# Patient Record
Sex: MALE | Race: WHITE | Employment: OTHER
[De-identification: names, ages, dates, MRNs, and addresses within clinical notes are randomized per-mention and may not be internally consistent; named-entity substitution may affect disease eponyms.]

---

## 2017-01-04 ENCOUNTER — OFFICE VISIT (OUTPATIENT)
Dept: FAMILY MEDICINE CLINIC | Facility: CLINIC | Age: 82
End: 2017-01-04

## 2017-01-04 VITALS
OXYGEN SATURATION: 97 % | BODY MASS INDEX: 30.16 KG/M2 | SYSTOLIC BLOOD PRESSURE: 110 MMHG | HEIGHT: 65 IN | WEIGHT: 181 LBS | HEART RATE: 67 BPM | DIASTOLIC BLOOD PRESSURE: 78 MMHG

## 2017-01-04 DIAGNOSIS — E78.5 DYSLIPIDEMIA: ICD-10-CM

## 2017-01-04 DIAGNOSIS — Z12.5 PROSTATE CANCER SCREENING: ICD-10-CM

## 2017-01-04 DIAGNOSIS — Z00.00 ROUTINE GENERAL MEDICAL EXAMINATION AT A HEALTH CARE FACILITY: ICD-10-CM

## 2017-01-04 DIAGNOSIS — F51.01 PRIMARY INSOMNIA: ICD-10-CM

## 2017-01-04 DIAGNOSIS — J84.10 PULMONARY INTERSTITIAL FIBROSIS (HCC): Primary | ICD-10-CM

## 2017-01-04 PROCEDURE — G0439 PPPS, SUBSEQ VISIT: HCPCS | Performed by: FAMILY MEDICINE

## 2017-01-04 RX ORDER — ZOLPIDEM TARTRATE 10 MG/1
10 TABLET ORAL NIGHTLY PRN
Qty: 30 TABLET | Refills: 3 | Status: SHIPPED | OUTPATIENT
Start: 2017-01-04 | End: 2017-02-03

## 2017-01-04 ASSESSMENT — ANXIETY QUESTIONNAIRES: GAD7 TOTAL SCORE: 0

## 2017-01-04 ASSESSMENT — LIFESTYLE VARIABLES: HOW OFTEN DO YOU HAVE A DRINK CONTAINING ALCOHOL: 0

## 2017-01-04 ASSESSMENT — PATIENT HEALTH QUESTIONNAIRE - PHQ9: SUM OF ALL RESPONSES TO PHQ QUESTIONS 1-9: 0

## 2017-01-06 ENCOUNTER — TELEPHONE (OUTPATIENT)
Dept: FAMILY MEDICINE CLINIC | Facility: CLINIC | Age: 82
End: 2017-01-06

## 2017-01-10 LAB
ALBUMIN SERPL-MCNC: 4.1 G/DL
ALP BLD-CCNC: 69 U/L
ALT SERPL-CCNC: 25 U/L
AST SERPL-CCNC: 26 U/L
BASOPHILS ABSOLUTE: 0 /ΜL
BASOPHILS RELATIVE PERCENT: 0.3 %
BILIRUB SERPL-MCNC: 1.2 MG/DL (ref 0.1–1.4)
BUN BLDV-MCNC: 19 MG/DL
CALCIUM SERPL-MCNC: 8.8 MG/DL
CHLORIDE BLD-SCNC: 102 MMOL/L
CHOLESTEROL, TOTAL: 182 MG/DL
CHOLESTEROL/HDL RATIO: 5.1
CO2: 26 MMOL/L
CREAT SERPL-MCNC: 0.89 MG/DL
EOSINOPHILS ABSOLUTE: 0 /ΜL
EOSINOPHILS RELATIVE PERCENT: 0.4 %
GFR CALCULATED: >60
GLUCOSE BLD-MCNC: 106 MG/DL
HCT VFR BLD CALC: 42.1 % (ref 41–53)
HDLC SERPL-MCNC: 36 MG/DL (ref 35–70)
HEMOGLOBIN: 14.4 G/DL (ref 13.5–17.5)
LDL CHOLESTEROL CALCULATED: 126 MG/DL (ref 0–160)
LYMPHOCYTES ABSOLUTE: 1.2 /ΜL
LYMPHOCYTES RELATIVE PERCENT: 25.9 %
MCH RBC QN AUTO: 29.9 PG
MCHC RBC AUTO-ENTMCNC: 34.1 G/DL
MCV RBC AUTO: 88 FL
MONOCYTES ABSOLUTE: 0.8 /ΜL
MONOCYTES RELATIVE PERCENT: 16.6 %
NEUTROPHILS ABSOLUTE: 2.7 /ΜL
NEUTROPHILS RELATIVE PERCENT: 56.8 %
PDW BLD-RTO: 15 %
PLATELET # BLD: 120 K/ΜL
PMV BLD AUTO: 9 FL
POTASSIUM SERPL-SCNC: 4.3 MMOL/L
PROSTATE SPECIFIC ANTIGEN: 2.08 NG/ML
RBC # BLD: 4.79 10^6/ΜL
SODIUM BLD-SCNC: 137 MMOL/L
TOTAL PROTEIN: 7.6
TRIGL SERPL-MCNC: 98 MG/DL
VLDLC SERPL CALC-MCNC: 20 MG/DL
WBC # BLD: 4.8 10^3/ML

## 2017-01-11 DIAGNOSIS — Z12.5 PROSTATE CANCER SCREENING: ICD-10-CM

## 2017-01-11 DIAGNOSIS — E78.5 DYSLIPIDEMIA: ICD-10-CM

## 2017-02-14 RX ORDER — TAMSULOSIN HYDROCHLORIDE 0.4 MG/1
CAPSULE ORAL
Qty: 90 CAPSULE | Refills: 3 | Status: SHIPPED | OUTPATIENT
Start: 2017-02-14 | End: 2018-02-09 | Stop reason: SDUPTHER

## 2017-04-21 ENCOUNTER — OFFICE VISIT (OUTPATIENT)
Dept: FAMILY MEDICINE CLINIC | Age: 82
End: 2017-04-21
Payer: MEDICARE

## 2017-04-21 VITALS
SYSTOLIC BLOOD PRESSURE: 114 MMHG | BODY MASS INDEX: 29.95 KG/M2 | HEART RATE: 72 BPM | DIASTOLIC BLOOD PRESSURE: 60 MMHG | WEIGHT: 180 LBS

## 2017-04-21 DIAGNOSIS — J84.10 PULMONARY INTERSTITIAL FIBROSIS (HCC): ICD-10-CM

## 2017-04-21 DIAGNOSIS — J44.9 OBSTRUCTIVE CHRONIC BRONCHITIS WITHOUT EXACERBATION (HCC): Primary | ICD-10-CM

## 2017-04-21 DIAGNOSIS — E78.5 DYSLIPIDEMIA: ICD-10-CM

## 2017-04-21 PROCEDURE — 3023F SPIROM DOC REV: CPT | Performed by: FAMILY MEDICINE

## 2017-04-21 PROCEDURE — G8427 DOCREV CUR MEDS BY ELIG CLIN: HCPCS | Performed by: FAMILY MEDICINE

## 2017-04-21 PROCEDURE — 99213 OFFICE O/P EST LOW 20 MIN: CPT | Performed by: FAMILY MEDICINE

## 2017-04-21 PROCEDURE — 4040F PNEUMOC VAC/ADMIN/RCVD: CPT | Performed by: FAMILY MEDICINE

## 2017-04-21 PROCEDURE — 1036F TOBACCO NON-USER: CPT | Performed by: FAMILY MEDICINE

## 2017-04-21 PROCEDURE — 1123F ACP DISCUSS/DSCN MKR DOCD: CPT | Performed by: FAMILY MEDICINE

## 2017-04-21 PROCEDURE — G8926 SPIRO NO PERF OR DOC: HCPCS | Performed by: FAMILY MEDICINE

## 2017-04-21 PROCEDURE — G8420 CALC BMI NORM PARAMETERS: HCPCS | Performed by: FAMILY MEDICINE

## 2017-04-21 RX ORDER — FAMOTIDINE 40 MG/1
20 TABLET, FILM COATED ORAL 2 TIMES DAILY
COMMUNITY
Start: 2017-03-16

## 2017-04-21 RX ORDER — TRIAMCINOLONE ACETONIDE 55 UG/1
2 SPRAY, METERED NASAL DAILY
Status: ON HOLD | COMMUNITY
End: 2021-01-04

## 2017-04-21 ASSESSMENT — PATIENT HEALTH QUESTIONNAIRE - PHQ9
1. LITTLE INTEREST OR PLEASURE IN DOING THINGS: 0
SUM OF ALL RESPONSES TO PHQ9 QUESTIONS 1 & 2: 0
SUM OF ALL RESPONSES TO PHQ QUESTIONS 1-9: 0
2. FEELING DOWN, DEPRESSED OR HOPELESS: 0

## 2017-04-21 ASSESSMENT — ENCOUNTER SYMPTOMS
VOMITING: 0
BLOOD IN STOOL: 0
WHEEZING: 0
SHORTNESS OF BREATH: 1
DIARRHEA: 0
CHEST TIGHTNESS: 0
BACK PAIN: 0
CONSTIPATION: 0
COUGH: 1
ABDOMINAL PAIN: 0
NAUSEA: 0

## 2017-06-20 ENCOUNTER — TELEPHONE (OUTPATIENT)
Dept: FAMILY MEDICINE CLINIC | Age: 82
End: 2017-06-20

## 2017-08-30 PROBLEM — D48.5 NEOPLASM OF UNCERTAIN BEHAVIOR OF SKIN: Status: ACTIVE | Noted: 2017-08-30

## 2017-11-28 RX ORDER — ZOLPIDEM TARTRATE 10 MG/1
10 TABLET ORAL NIGHTLY PRN
Qty: 30 TABLET | Refills: 3 | Status: SHIPPED | OUTPATIENT
Start: 2017-11-28 | End: 2018-07-08 | Stop reason: SDUPTHER

## 2018-01-09 ENCOUNTER — OFFICE VISIT (OUTPATIENT)
Dept: FAMILY MEDICINE CLINIC | Age: 83
End: 2018-01-09
Payer: MEDICARE

## 2018-01-09 VITALS
HEART RATE: 69 BPM | WEIGHT: 158.2 LBS | SYSTOLIC BLOOD PRESSURE: 112 MMHG | OXYGEN SATURATION: 95 % | BODY MASS INDEX: 26.36 KG/M2 | DIASTOLIC BLOOD PRESSURE: 60 MMHG | HEIGHT: 65 IN

## 2018-01-09 DIAGNOSIS — J44.9 CHRONIC OBSTRUCTIVE PULMONARY DISEASE, UNSPECIFIED COPD TYPE (HCC): ICD-10-CM

## 2018-01-09 DIAGNOSIS — Z00.00 ROUTINE GENERAL MEDICAL EXAMINATION AT A HEALTH CARE FACILITY: ICD-10-CM

## 2018-01-09 DIAGNOSIS — E78.5 DYSLIPIDEMIA: ICD-10-CM

## 2018-01-09 DIAGNOSIS — Z00.00 PHYSICAL EXAM, ANNUAL: Primary | ICD-10-CM

## 2018-01-09 DIAGNOSIS — J84.10 PULMONARY INTERSTITIAL FIBROSIS (HCC): ICD-10-CM

## 2018-01-09 PROCEDURE — G0438 PPPS, INITIAL VISIT: HCPCS | Performed by: FAMILY MEDICINE

## 2018-01-09 ASSESSMENT — PATIENT HEALTH QUESTIONNAIRE - PHQ9
SUM OF ALL RESPONSES TO PHQ QUESTIONS 1-9: 0
2. FEELING DOWN, DEPRESSED OR HOPELESS: 0
1. LITTLE INTEREST OR PLEASURE IN DOING THINGS: 0
SUM OF ALL RESPONSES TO PHQ9 QUESTIONS 1 & 2: 0

## 2018-01-09 ASSESSMENT — ANXIETY QUESTIONNAIRES: GAD7 TOTAL SCORE: 0

## 2018-01-09 NOTE — PROGRESS NOTES
Medicare Annual Wellness Visit  Name: Pedrito Ren Date: 2018   MRN: [id number] Sex: Male   Age: 80 y.o. Ethnicity: Non-/Non    : 1933 Race: Heri Prieto is here for Medicare AWV    Screenings for behavioral, psychosocial and functional/safety risks, and cognitive dysfunction are all negative except as indicated below. These results, as well as other patient data from the [address] [facility], are documented in Flowsheets linked to this Encounter. Allergies   Allergen Reactions    Bactrim     Other      Sweet potatoes     Prednisone Other (See Comments)     Trouble voiding     Prior to Visit Medications    Medication Sig Taking? Authorizing Provider   zolpidem (AMBIEN) 10 MG tablet Take 1 tablet by mouth nightly as needed for Sleep . Yes Chelsea Braswell MD   guaiFENesin (ROBITUSSIN) 100 MG/5ML syrup Take 200 mg by mouth 3 times daily as needed for Cough Yes Historical Provider, MD   famotidine (PEPCID) 40 MG tablet Take 1 tablet by mouth daily Yes Historical Provider, MD   Diphenhydramine-APAP, sleep, (TYLENOL PM EXTRA STRENGTH PO) Take 1 tablet by mouth daily Yes Historical Provider, MD   triamcinolone (NASACORT ALLERGY 24HR) 55 MCG/ACT nasal inhaler 2 sprays by Nasal route daily Yes Historical Provider, MD   tamsulosin (FLOMAX) 0.4 MG capsule TAKE 1 CAPSULE DAILY Yes Baron Quiñones MD   ESBRIET 267 MG CAPS Take 9 capsules by mouth daily Pt takes 3 with each meal Yes Historical Provider, MD   budesonide (PULMICORT) 0.5 MG/2ML nebulizer suspension 1 treatment twice daily Yes Historical Provider, MD   BROVANA 15 MCG/2ML NEBU 1 treatment twice a day Yes Historical Provider, MD   albuterol (PROVENTIL HFA;VENTOLIN HFA) 108 (90 BASE) MCG/ACT inhaler Inhale 2 puffs into the lungs every 6 hours as needed for Wheezing. Yes Chelsea Braswell MD   aspirin 81 MG tablet Take 81 mg by mouth daily.    Yes Historical Provider, MD   multivitamin [facility]) per tablet Take 1 tablet by mouth daily. Yes Historical Provider, MD     Past Medical History:   Diagnosis Date    Asthma      Past Surgical History:   Procedure Laterality Date    PRE-MALIGNANT / BENIGN SKIN LESION EXCISION  09/12/2017    exc bx lesion right side of nose and right neck     No family history on file. CareTeam (Including outside providers/suppliers regularly involved in providing care):   Patient Care Team:  Kehinde Gustafson MD as PCP - General (Family Medicine)  Kehinde Gustafson MD as PCP - S Attributed Provider  Aisha Sosa as Consulting Physician (Internal Medicine)    Wt Readings from Last 3 Encounters:   01/09/18 158 lb 3.2 oz (71.8 kg)   10/16/17 160 lb (72.6 kg)   09/18/17 157 lb (71.2 kg)     Vitals:    01/09/18 1352   BP: 112/60   Pulse: 69   SpO2: 95%   Weight: 158 lb 3.2 oz (71.8 kg)   Height: 5' 5\" (1.651 m)       General Appearance: alert and oriented to person, place and time, well-developed and well-nourished, in no acute distress  Skin: warm and dry, no rash or erythema  Head: normocephalic and atraumatic  ENT: tympanic membrane, external ear and ear canal normal bilaterally, oropharynx clear and moist with normal mucous membranes  Neck: neck supple and non tender without mass, no thyromegaly or thyroid nodules, no cervical lymphadenopathy   Pulmonary/Chest: clear to auscultation bilaterally- no wheezes, rales or rhonchi, normal air movement, no respiratory distress  Cardiovascular: normal rate, normal S1 and S2, no gallops, intact distal pulses and no carotid bruits  Abdomen: soft, non-tender, non-distended, normal bowel sounds, no masses or organomegaly  Genitourinary: genitals normal without hernia or inguinal adenopathy, rectal normal, no masses, guaiac negative stool, prostate 2+ enlarged, symmetric without nodules  Extremities: no cyanosis, no clubbing and no edema    Patient's complete Health Risk Assessment and screening values have been reviewed and are found in Flowsheets.

## 2018-01-10 ENCOUNTER — HOSPITAL ENCOUNTER (OUTPATIENT)
Age: 83
Setting detail: SPECIMEN
Discharge: HOME | End: 2018-01-10
Payer: MEDICARE

## 2018-01-10 DIAGNOSIS — Z00.00 PHYSICAL EXAM, ANNUAL: ICD-10-CM

## 2018-01-10 LAB
ABSOLUTE EOS #: <0.03 K/UL (ref 0–0.44)
ABSOLUTE IMMATURE GRANULOCYTE: 0.07 K/UL (ref 0–0.3)
ABSOLUTE LYMPH #: 1.44 K/UL (ref 1.1–3.7)
ABSOLUTE MONO #: 0.84 K/UL (ref 0.1–1.2)
ALBUMIN SERPL-MCNC: 3.9 G/DL (ref 3.5–5.2)
ALBUMIN/GLOBULIN RATIO: 1 (ref 1–2.5)
ALP BLD-CCNC: 73 U/L (ref 40–129)
ALT SERPL-CCNC: 12 U/L (ref 5–41)
ANION GAP SERPL CALCULATED.3IONS-SCNC: 14 MMOL/L (ref 9–17)
AST SERPL-CCNC: 18 U/L
BASOPHILS # BLD: 0 % (ref 0–2)
BASOPHILS ABSOLUTE: <0.03 K/UL (ref 0–0.2)
BILIRUB SERPL-MCNC: 0.62 MG/DL (ref 0.3–1.2)
BUN BLDV-MCNC: 16 MG/DL (ref 8–23)
BUN/CREAT BLD: ABNORMAL (ref 9–20)
CALCIUM SERPL-MCNC: 9.1 MG/DL (ref 8.6–10.4)
CHLORIDE BLD-SCNC: 98 MMOL/L (ref 98–107)
CHOLESTEROL/HDL RATIO: 3.6
CHOLESTEROL: 153 MG/DL
CO2: 26 MMOL/L (ref 20–31)
CREAT SERPL-MCNC: 0.72 MG/DL (ref 0.7–1.2)
DIFFERENTIAL TYPE: ABNORMAL
EOSINOPHILS RELATIVE PERCENT: 0 % (ref 1–4)
GFR AFRICAN AMERICAN: >60 ML/MIN
GFR NON-AFRICAN AMERICAN: >60 ML/MIN
GFR SERPL CREATININE-BSD FRML MDRD: ABNORMAL ML/MIN/{1.73_M2}
GFR SERPL CREATININE-BSD FRML MDRD: ABNORMAL ML/MIN/{1.73_M2}
GLUCOSE BLD-MCNC: 104 MG/DL (ref 70–99)
HCT VFR BLD CALC: 45.7 % (ref 40.7–50.3)
HDLC SERPL-MCNC: 43 MG/DL
HEMOGLOBIN: 14.8 G/DL (ref 13–17)
IMMATURE GRANULOCYTES: 2 %
LDL CHOLESTEROL: 91 MG/DL (ref 0–130)
LYMPHOCYTES # BLD: 30 % (ref 24–43)
MCH RBC QN AUTO: 29.4 PG (ref 25.2–33.5)
MCHC RBC AUTO-ENTMCNC: 32.4 G/DL (ref 28.4–34.8)
MCV RBC AUTO: 90.9 FL (ref 82.6–102.9)
MONOCYTES # BLD: 18 % (ref 3–12)
PDW BLD-RTO: 13.4 % (ref 11.8–14.4)
PLATELET # BLD: ABNORMAL K/UL (ref 138–453)
PLATELET ESTIMATE: ABNORMAL
PLATELET, FLUORESCENCE: 117 K/UL (ref 138–453)
PLATELET, IMMATURE FRACTION: 6.7 % (ref 1.1–10.3)
PMV BLD AUTO: ABNORMAL FL (ref 8.1–13.5)
POTASSIUM SERPL-SCNC: 4.4 MMOL/L (ref 3.7–5.3)
PROSTATE SPECIFIC ANTIGEN: 2.51 UG/L
RBC # BLD: 5.03 M/UL (ref 4.21–5.77)
RBC # BLD: ABNORMAL 10*6/UL
SEG NEUTROPHILS: 51 % (ref 36–65)
SEGMENTED NEUTROPHILS ABSOLUTE COUNT: 2.44 K/UL (ref 1.5–8.1)
SODIUM BLD-SCNC: 138 MMOL/L (ref 135–144)
TOTAL PROTEIN: 7.7 G/DL (ref 6.4–8.3)
TRIGL SERPL-MCNC: 93 MG/DL
VLDLC SERPL CALC-MCNC: NORMAL MG/DL (ref 1–30)
WBC # BLD: 4.8 K/UL (ref 3.5–11.3)
WBC # BLD: ABNORMAL 10*3/UL

## 2018-02-09 RX ORDER — TAMSULOSIN HYDROCHLORIDE 0.4 MG/1
CAPSULE ORAL
Qty: 90 CAPSULE | Refills: 3 | Status: SHIPPED | OUTPATIENT
Start: 2018-02-09 | End: 2019-02-04 | Stop reason: SDUPTHER

## 2018-07-08 DIAGNOSIS — G47.00 INSOMNIA, UNSPECIFIED TYPE: Primary | ICD-10-CM

## 2018-07-09 RX ORDER — ZOLPIDEM TARTRATE 10 MG/1
TABLET ORAL
Qty: 30 TABLET | Refills: 0 | Status: SHIPPED | OUTPATIENT
Start: 2018-07-09 | End: 2019-11-21 | Stop reason: SDUPTHER

## 2018-07-09 NOTE — TELEPHONE ENCOUNTER
Health Maintenance   Topic Date Due    Shingles Vaccine (1 of 2 - 2 Dose Series) 08/18/1983    Pneumococcal low/med risk (2 of 2 - PPSV23) 11/23/2017    Flu vaccine (1) 09/01/2018    DTaP/Tdap/Td vaccine (2 - Td) 06/12/2024       Hemoglobin A1C (%)   Date Value   12/17/2012 5.6             ( goal A1C is < 7)   No results found for: LABMICR  LDL Cholesterol (mg/dL)   Date Value   01/10/2018 91     LDL Calculated (mg/dL)   Date Value   01/10/2017 126       (goal LDL is <100)   AST (U/L)   Date Value   01/10/2018 18     ALT (U/L)   Date Value   01/10/2018 12     BUN (mg/dL)   Date Value   01/10/2018 16     BP Readings from Last 3 Encounters:   01/09/18 112/60   10/16/17 (!) 111/55   09/18/17 (!) 94/47          (goal 120/80)    All Future Testing planned in [facility]      Next Visit Date:  No future appointments.          Patient Active Problem List:     COPD (chronic obstructive pulmonary disease) ([address]     Dyslipidemia     Pulmonary interstitial fibrosis ([address]     Neoplasm of uncertain behavior of skin

## 2019-02-04 RX ORDER — TAMSULOSIN HYDROCHLORIDE 0.4 MG/1
CAPSULE ORAL
Qty: 90 CAPSULE | Refills: 3 | Status: SHIPPED | OUTPATIENT
Start: 2019-02-04 | End: 2020-01-30

## 2019-05-15 ENCOUNTER — OFFICE VISIT (OUTPATIENT)
Dept: FAMILY MEDICINE CLINIC | Age: 84
End: 2019-05-15
Payer: MEDICARE

## 2019-05-15 VITALS
DIASTOLIC BLOOD PRESSURE: 60 MMHG | OXYGEN SATURATION: 85 % | WEIGHT: 172 LBS | SYSTOLIC BLOOD PRESSURE: 110 MMHG | HEART RATE: 82 BPM | BODY MASS INDEX: 27.64 KG/M2 | HEIGHT: 66 IN

## 2019-05-15 DIAGNOSIS — Z23 IMMUNIZATION DUE: Primary | ICD-10-CM

## 2019-05-15 DIAGNOSIS — J84.10 PULMONARY INTERSTITIAL FIBROSIS (HCC): ICD-10-CM

## 2019-05-15 DIAGNOSIS — J44.1 CHRONIC OBSTRUCTIVE PULMONARY DISEASE WITH ACUTE EXACERBATION (HCC): ICD-10-CM

## 2019-05-15 DIAGNOSIS — E78.5 DYSLIPIDEMIA: ICD-10-CM

## 2019-05-15 DIAGNOSIS — Z00.00 ROUTINE GENERAL MEDICAL EXAMINATION AT A HEALTH CARE FACILITY: ICD-10-CM

## 2019-05-15 DIAGNOSIS — Z12.5 PROSTATE CANCER SCREENING: ICD-10-CM

## 2019-05-15 PROCEDURE — 90732 PPSV23 VACC 2 YRS+ SUBQ/IM: CPT | Performed by: FAMILY MEDICINE

## 2019-05-15 PROCEDURE — 1123F ACP DISCUSS/DSCN MKR DOCD: CPT | Performed by: FAMILY MEDICINE

## 2019-05-15 PROCEDURE — G0439 PPPS, SUBSEQ VISIT: HCPCS | Performed by: FAMILY MEDICINE

## 2019-05-15 PROCEDURE — G0009 ADMIN PNEUMOCOCCAL VACCINE: HCPCS | Performed by: FAMILY MEDICINE

## 2019-05-15 PROCEDURE — 4040F PNEUMOC VAC/ADMIN/RCVD: CPT | Performed by: FAMILY MEDICINE

## 2019-05-15 ASSESSMENT — PATIENT HEALTH QUESTIONNAIRE - PHQ9
SUM OF ALL RESPONSES TO PHQ QUESTIONS 1-9: 0
SUM OF ALL RESPONSES TO PHQ QUESTIONS 1-9: 0

## 2019-05-15 ASSESSMENT — ANXIETY QUESTIONNAIRES: GAD7 TOTAL SCORE: 0

## 2019-05-15 ASSESSMENT — LIFESTYLE VARIABLES: HOW OFTEN DO YOU HAVE A DRINK CONTAINING ALCOHOL: 0

## 2019-05-15 NOTE — PROGRESS NOTES
Procedure Laterality Date    PRE-MALIGNANT / BENIGN SKIN LESION EXCISION  09/12/2017    exc bx lesion right side of nose and right neck     No family history on file. CareTeam (Including outside providers/suppliers regularly involved in providing care):   Patient Care Team:  Marcio Giraldo MD as PCP - General (Family Medicine)  Marcio Giraldo MD as PCP - S Attributed Provider  Alvaro Lemus as Consulting Physician (Internal Medicine)    Wt Readings from Last 3 Encounters:   05/15/19 172 lb (78 kg)   01/09/18 158 lb 3.2 oz (71.8 kg)   10/16/17 160 lb (72.6 kg)     Vitals:    05/15/19 1451   BP: 110/60   Pulse: 82   SpO2: (!) 85%   Weight: 172 lb (78 kg)   Height: 5' 6\" (1.676 m)     Body mass index is 27.76 kg/m². Based upon direct observation of the patient, evaluation of cognition reveals recent and remote memory intact. General Appearance: alert and oriented to person, place and time, well-developed and well-nourished, in no acute distress  Skin: no rash or erythema  ENT: tympanic membrane, external ear and ear canal normal bilaterally, oropharynx clear and moist with normal mucous membranes  Neck: neck supple and non tender without mass, no thyromegaly or thyroid nodules, no cervical lymphadenopathy   Pulmonary/Chest: no chest wall tenderness and rales present- bilateral crackles  Cardiovascular: normal rate, normal S1 and S2, no gallops, intact distal pulses and no carotid bruits  Abdomen: soft, non-tender and non-distended  Genitourinary: rectal normal, no masses, guaiac negative stool  Extremities: no cyanosis, no clubbing and no edema    Patient's complete Health Risk Assessment and screening values have been reviewed and are found in Flowsheets. The following problems were reviewed today and where indicated follow up appointments were made and/or referrals ordered.     Positive Risk Factor Screenings with Interventions:     Health Habits/Nutrition:  Health Habits/Nutrition  Do you exercise for at least 20 minutes 2-3 times per week?: Yes  Have you lost any weight without trying in the past 3 months?: No  Do you eat fewer than 2 meals per day?: No  Have you seen a dentist within the past year?: (!) No  Body mass index is 27.76 kg/m². Health Habits/Nutrition Interventions:  · as tolerated. Hearing/Vision:  Hearing/Vision  Do you or your family notice any trouble with your hearing?: (!) Yes  Do you have difficulty driving, watching TV, or doing any of your daily activities because of your eyesight?: No  Have you had an eye exam within the past year?: (!) No  Hearing/Vision Interventions:  · normal screening    Safety:  Safety  Do you have working smoke detectors?: (!) No  Have all throw rugs been removed or fastened?: (!) No  Do you have non-slip mats in all bathtubs?: Yes  Do all of your stairways have a railing or banister?: Yes  Are your doorways, halls and stairs free of clutter?: Yes  Do you always fasten your seatbelt when you are in a car?: Yes  Safety Interventions:  · Home safety tips provided    ADL:  ADLs  In the past 7 days, did you need help from others to perform any of the following everyday activities? Eating, dressing, grooming, bathing, toileting, or walking/balance?: None  In the past 7 days, did you need help from others to take care of any of the following?  Laundry, housekeeping, banking/finances, shopping, telephone use, food preparation, transportation, or taking medications?: [employer], Housekeeping, Shopping, Food Preparation  ADL Interventions:  · Patient declines any further evaluation/treatment for this issue    Personalized Preventive Plan   Current Health Maintenance Status  Immunization History   Administered Date(s) Administered    Influenza Vaccine, unspecified formulation 10/20/2016, 09/09/2017    Influenza Virus Vaccine 10/11/2012, 10/17/2013, 10/06/2014    Pneumococcal 13-valent Conjugate ([id number]) 02/04/2016, 11/23/2016    Pneumococcal Conjugate 7-valent

## 2019-05-15 NOTE — LETTER
[facility] [address]  [address] [place] [facility] [address]  Phone: [phone]  Fax: [phone]    Alberto Barcenas MD        May 15, 2019     Patient: Demetrius Chavez   YOB: 1933   Date of Visit: 5/15/2019       To Whom It May Concern: It is my medical opinion that Quincy Camara requires a disability parking placard for the following reasons:  He uses portable oxygen. Duration of need: permanent    If you have any questions or concerns, please don't hesitate to call.     Sincerely,        Alberto Barcenas MD

## 2019-05-15 NOTE — PATIENT INSTRUCTIONS
Personalized Preventive Plan for Juan Luis Amanda - 5/15/2019  Medicare offers a range of preventive health benefits. Some of the tests and screenings are paid in full while other may be subject to a deductible, co-insurance, and/or copay. Some of these benefits include a comprehensive review of your medical history including lifestyle, illnesses that may run in your family, and various assessments and screenings as appropriate. After reviewing your medical record and screening and assessments performed today your provider may have ordered immunizations, labs, imaging, and/or referrals for you. A list of these orders (if applicable) as well as your Preventive Care list are included within your After Visit Summary for your review. Other Preventive Recommendations:    · A preventive eye exam performed by an eye specialist is recommended every 1-2 years to screen for glaucoma; cataracts, macular degeneration, and other eye disorders. · A preventive dental visit is recommended every 6 months. · Try to get at least 150 minutes of exercise per week or 10,000 steps per day on a pedometer . · Order or download the FREE \"Exercise & Physical Activity: Your Everyday Guide\" from [employer]. Call 4-511.623.8169 or search [employer] online. · You need 6534-4068 mg of calcium and 8855-7491 IU of vitamin D per day. It is possible to meet your calcium requirement with diet alone, but a vitamin D supplement is usually necessary to meet this goal.  · When exposed to the sun, use a sunscreen that protects against both UVA and UVB radiation with an SPF of 30 or greater. Reapply every 2 to 3 hours or after sweating, drying off with a towel, or swimming. · Always wear a seat belt when traveling in a car. Always wear a helmet when riding a bicycle or motorcycle.

## 2019-06-04 ENCOUNTER — HOSPITAL ENCOUNTER (OUTPATIENT)
Age: 84
Setting detail: SPECIMEN
Discharge: HOME | End: 2019-06-04
Payer: MEDICARE

## 2019-06-04 DIAGNOSIS — J44.1 CHRONIC OBSTRUCTIVE PULMONARY DISEASE WITH ACUTE EXACERBATION (HCC): ICD-10-CM

## 2019-06-04 DIAGNOSIS — Z12.5 PROSTATE CANCER SCREENING: ICD-10-CM

## 2019-06-04 DIAGNOSIS — J84.10 PULMONARY INTERSTITIAL FIBROSIS (HCC): ICD-10-CM

## 2019-06-04 DIAGNOSIS — E78.5 DYSLIPIDEMIA: ICD-10-CM

## 2019-06-04 DIAGNOSIS — Z23 IMMUNIZATION DUE: ICD-10-CM

## 2019-06-04 LAB
ABSOLUTE EOS #: <0.03 K/UL (ref 0–0.44)
ABSOLUTE IMMATURE GRANULOCYTE: 0.04 K/UL (ref 0–0.3)
ABSOLUTE LYMPH #: 1.63 K/UL (ref 1.1–3.7)
ABSOLUTE MONO #: 1 K/UL (ref 0.1–1.2)
ALBUMIN SERPL-MCNC: 4.2 G/DL (ref 3.5–5.2)
ALBUMIN/GLOBULIN RATIO: 1.2 (ref 1–2.5)
ALP BLD-CCNC: 66 U/L (ref 40–129)
ALT SERPL-CCNC: 9 U/L (ref 5–41)
ANION GAP SERPL CALCULATED.3IONS-SCNC: 11 MMOL/L (ref 9–17)
AST SERPL-CCNC: 15 U/L
BASOPHILS # BLD: 0 % (ref 0–2)
BASOPHILS ABSOLUTE: <0.03 K/UL (ref 0–0.2)
BILIRUB SERPL-MCNC: 0.57 MG/DL (ref 0.3–1.2)
BUN BLDV-MCNC: 14 MG/DL (ref 8–23)
BUN/CREAT BLD: ABNORMAL (ref 9–20)
CALCIUM SERPL-MCNC: 8.8 MG/DL (ref 8.6–10.4)
CHLORIDE BLD-SCNC: 99 MMOL/L (ref 98–107)
CHOLESTEROL/HDL RATIO: 3.6
CHOLESTEROL: 176 MG/DL
CO2: 28 MMOL/L (ref 20–31)
CREAT SERPL-MCNC: 0.64 MG/DL (ref 0.7–1.2)
DIFFERENTIAL TYPE: ABNORMAL
EOSINOPHILS RELATIVE PERCENT: 0 % (ref 1–4)
GFR AFRICAN AMERICAN: >60 ML/MIN
GFR NON-AFRICAN AMERICAN: >60 ML/MIN
GFR SERPL CREATININE-BSD FRML MDRD: ABNORMAL ML/MIN/{1.73_M2}
GFR SERPL CREATININE-BSD FRML MDRD: ABNORMAL ML/MIN/{1.73_M2}
GLUCOSE BLD-MCNC: 98 MG/DL (ref 70–99)
HCT VFR BLD CALC: 46.9 % (ref 40.7–50.3)
HDLC SERPL-MCNC: 49 MG/DL
HEMOGLOBIN: 14.8 G/DL (ref 13–17)
IMMATURE GRANULOCYTES: 1 %
LDL CHOLESTEROL: 110 MG/DL (ref 0–130)
LYMPHOCYTES # BLD: 27 % (ref 24–43)
MCH RBC QN AUTO: 28.8 PG (ref 25.2–33.5)
MCHC RBC AUTO-ENTMCNC: 31.6 G/DL (ref 28.4–34.8)
MCV RBC AUTO: 91.4 FL (ref 82.6–102.9)
MONOCYTES # BLD: 16 % (ref 3–12)
NRBC AUTOMATED: 0 PER 100 WBC
PDW BLD-RTO: 13.8 % (ref 11.8–14.4)
PLATELET # BLD: 106 K/UL (ref 138–453)
PLATELET ESTIMATE: ABNORMAL
PMV BLD AUTO: 11.9 FL (ref 8.1–13.5)
POTASSIUM SERPL-SCNC: 4.4 MMOL/L (ref 3.7–5.3)
PROSTATE SPECIFIC ANTIGEN: 2.04 UG/L
RBC # BLD: 5.13 M/UL (ref 4.21–5.77)
RBC # BLD: ABNORMAL 10*6/UL
SEG NEUTROPHILS: 56 % (ref 36–65)
SEGMENTED NEUTROPHILS ABSOLUTE COUNT: 3.44 K/UL (ref 1.5–8.1)
SODIUM BLD-SCNC: 138 MMOL/L (ref 135–144)
TOTAL PROTEIN: 7.6 G/DL (ref 6.4–8.3)
TRIGL SERPL-MCNC: 85 MG/DL
VLDLC SERPL CALC-MCNC: NORMAL MG/DL (ref 1–30)
WBC # BLD: 6.1 K/UL (ref 3.5–11.3)
WBC # BLD: ABNORMAL 10*3/UL

## 2020-04-29 RX ORDER — TAMSULOSIN HYDROCHLORIDE 0.4 MG/1
CAPSULE ORAL
Qty: 90 CAPSULE | Refills: 3 | Status: SHIPPED | OUTPATIENT
Start: 2020-04-29 | End: 2020-09-22 | Stop reason: SDUPTHER

## 2020-09-22 ENCOUNTER — OFFICE VISIT (OUTPATIENT)
Dept: FAMILY MEDICINE CLINIC | Age: 85
End: 2020-09-22
Payer: MEDICARE

## 2020-09-22 VITALS
HEART RATE: 74 BPM | OXYGEN SATURATION: 78 % | SYSTOLIC BLOOD PRESSURE: 110 MMHG | DIASTOLIC BLOOD PRESSURE: 58 MMHG | TEMPERATURE: 97.3 F

## 2020-09-22 PROCEDURE — G8427 DOCREV CUR MEDS BY ELIG CLIN: HCPCS | Performed by: FAMILY MEDICINE

## 2020-09-22 PROCEDURE — G0008 ADMIN INFLUENZA VIRUS VAC: HCPCS | Performed by: FAMILY MEDICINE

## 2020-09-22 PROCEDURE — 4040F PNEUMOC VAC/ADMIN/RCVD: CPT | Performed by: FAMILY MEDICINE

## 2020-09-22 PROCEDURE — 1123F ACP DISCUSS/DSCN MKR DOCD: CPT | Performed by: FAMILY MEDICINE

## 2020-09-22 PROCEDURE — 99213 OFFICE O/P EST LOW 20 MIN: CPT | Performed by: FAMILY MEDICINE

## 2020-09-22 PROCEDURE — G8926 SPIRO NO PERF OR DOC: HCPCS | Performed by: FAMILY MEDICINE

## 2020-09-22 PROCEDURE — 90694 VACC AIIV4 NO PRSRV 0.5ML IM: CPT | Performed by: FAMILY MEDICINE

## 2020-09-22 PROCEDURE — 1036F TOBACCO NON-USER: CPT | Performed by: FAMILY MEDICINE

## 2020-09-22 PROCEDURE — G8421 BMI NOT CALCULATED: HCPCS | Performed by: FAMILY MEDICINE

## 2020-09-22 PROCEDURE — 3023F SPIROM DOC REV: CPT | Performed by: FAMILY MEDICINE

## 2020-09-22 RX ORDER — TAMSULOSIN HYDROCHLORIDE 0.4 MG/1
0.4 CAPSULE ORAL DAILY
Qty: 90 CAPSULE | Refills: 3 | Status: SHIPPED | OUTPATIENT
Start: 2020-09-22

## 2020-09-22 SDOH — ECONOMIC STABILITY: INCOME INSECURITY: HOW HARD IS IT FOR YOU TO PAY FOR THE VERY BASICS LIKE FOOD, HOUSING, MEDICAL CARE, AND HEATING?: NOT HARD AT ALL

## 2020-09-22 SDOH — ECONOMIC STABILITY: TRANSPORTATION INSECURITY
IN THE PAST 12 MONTHS, HAS THE LACK OF TRANSPORTATION KEPT YOU FROM MEDICAL APPOINTMENTS OR FROM GETTING MEDICATIONS?: NO

## 2020-09-22 SDOH — ECONOMIC STABILITY: FOOD INSECURITY: WITHIN THE PAST 12 MONTHS, YOU WORRIED THAT YOUR FOOD WOULD RUN OUT BEFORE YOU GOT MONEY TO BUY MORE.: NEVER TRUE

## 2020-09-22 SDOH — ECONOMIC STABILITY: FOOD INSECURITY: WITHIN THE PAST 12 MONTHS, THE FOOD YOU BOUGHT JUST DIDN'T LAST AND YOU DIDN'T HAVE MONEY TO GET MORE.: NEVER TRUE

## 2020-09-22 SDOH — ECONOMIC STABILITY: TRANSPORTATION INSECURITY
IN THE PAST 12 MONTHS, HAS LACK OF TRANSPORTATION KEPT YOU FROM MEETINGS, WORK, OR FROM GETTING THINGS NEEDED FOR DAILY LIVING?: NO

## 2020-09-22 ASSESSMENT — PATIENT HEALTH QUESTIONNAIRE - PHQ9
2. FEELING DOWN, DEPRESSED OR HOPELESS: 0
SUM OF ALL RESPONSES TO PHQ9 QUESTIONS 1 & 2: 0
SUM OF ALL RESPONSES TO PHQ QUESTIONS 1-9: 0
SUM OF ALL RESPONSES TO PHQ QUESTIONS 1-9: 0
1. LITTLE INTEREST OR PLEASURE IN DOING THINGS: 0

## 2020-09-22 ASSESSMENT — ENCOUNTER SYMPTOMS
BACK PAIN: 0
COUGH: 1
CONSTIPATION: 0
SHORTNESS OF BREATH: 1
BLOOD IN STOOL: 0
WHEEZING: 0
ABDOMINAL PAIN: 0
DIARRHEA: 0

## 2020-09-22 NOTE — PROGRESS NOTES
Marcia Myers is a 80 y.o. male who presents todayfor his medical conditions/complaints as noted below. Marcia Myers is here today c/o6 Month Follow-Up      :     HPI    Routine follow up on PL he is in a wheel chair now and is visibly dyspneic. He feels he has been inactive so long and is starting to feel better by going to Pulmonary rehab. He has some sores on his bottom for which he is going to wound care tomorrow. Past Medical History:   Diagnosis Date    Asthma       Past Surgical History:   Procedure Laterality Date    PRE-MALIGNANT / BENIGN SKIN LESION EXCISION  2017    exc bx lesion right side of nose and right neck     No family history on file. Social History     Tobacco Use    Smoking status: Former Smoker     Last attempt to quit: 1967     Years since quittin.8    Smokeless tobacco: Never Used   Substance Use Topics    Alcohol use: No      Current Outpatient Medications   Medication Sig Dispense Refill    tamsulosin (FLOMAX) 0.4 MG capsule Take 1 capsule by mouth daily 90 capsule 3    guaiFENesin (ROBITUSSIN) 100 MG/5ML syrup Take 200 mg by mouth 3 times daily as needed for Cough      famotidine (PEPCID) 40 MG tablet Take 1 tablet by mouth daily      Diphenhydramine-APAP, sleep, (TYLENOL PM EXTRA STRENGTH PO) Take 1 tablet by mouth daily      triamcinolone (NASACORT ALLERGY 24HR) 55 MCG/ACT nasal inhaler 2 sprays by Nasal route daily      ESBRIET 267 MG CAPS Take 9 capsules by mouth daily Pt takes 3 with each meal      budesonide (PULMICORT) 0.5 MG/2ML nebulizer suspension 1 treatment twice daily  0    BROVANA 15 MCG/2ML NEBU 1 treatment twice a day  0    albuterol (PROVENTIL HFA;VENTOLIN HFA) 108 (90 BASE) MCG/ACT inhaler Inhale 2 puffs into the lungs every 6 hours as needed for Wheezing. 1 Inhaler 3    aspirin 81 MG tablet Take 81 mg by mouth daily.  multivitamin (THERAGRAN) per tablet Take 1 tablet by mouth daily.          No current facility-administered medications for this visit. Allergies   Allergen Reactions    Bactrim     Other      Sweet potatoes     Prednisone Other (See Comments)     Trouble voiding         Subjective:   Review of Systems   Constitutional: Negative for chills, diaphoresis, fatigue and fever. HENT: Negative for congestion and hearing loss. Eyes: Negative for visual disturbance. Respiratory: Positive for cough and shortness of breath. Negative for wheezing. Cardiovascular: Negative for chest pain, palpitations and leg swelling. Gastrointestinal: Negative for abdominal pain, blood in stool, constipation and diarrhea. Genitourinary: Positive for difficulty urinating and frequency. Negative for dysuria. Musculoskeletal: Positive for gait problem. Negative for arthralgias, back pain and neck pain. Skin: Positive for wound. Negative for rash. Neurological: Positive for weakness. Negative for numbness and headaches. Psychiatric/Behavioral: Negative for dysphoric mood and sleep disturbance.       :   BP (!) 110/58   Pulse 74   Temp 97.3 °F (36.3 °C)   SpO2 (!) 78%     Physical Exam  Constitutional:       General: He is not in acute distress. Appearance: He is well-developed. He is not diaphoretic. HENT:      Head: Normocephalic and atraumatic. Mouth/Throat:      Pharynx: No oropharyngeal exudate. Eyes:      General: No scleral icterus. Right eye: No discharge. Left eye: No discharge. Neck:      Musculoskeletal: Neck supple. Thyroid: No thyromegaly. Vascular: No carotid bruit. Cardiovascular:      Rate and Rhythm: Normal rate and regular rhythm. Heart sounds: Normal heart sounds. No murmur. No friction rub. No gallop. Pulmonary:      Effort: No respiratory distress. Breath sounds: Examination of the right-lower field reveals decreased breath sounds and rales. Examination of the left-lower field reveals decreased breath sounds and rales. Decreased breath sounds and rales (dry crackles) present. No wheezing. Chest:      Chest wall: No tenderness. Abdominal:      Tenderness: There is no abdominal tenderness. Genitourinary:     Prostate: Normal.      Rectum: Normal. Guaiac result negative. Musculoskeletal:         General: No tenderness. Right lower leg: No edema. Left lower leg: No edema. Lymphadenopathy:      Cervical: No cervical adenopathy. Skin:     Findings: No rash. Comments: Three superficial pressure ulcers on right buttock region little purulence. Neurological:      Mental Status: He is alert and oriented to person, place, and time. Cranial Nerves: No cranial nerve deficit. Coordination: Coordination normal.   Psychiatric:         Behavior: Behavior normal.         Thought Content: Thought content normal.         Judgment: Judgment normal.         Assessment:       Diagnosis Orders   1. Pulmonary interstitial fibrosis ([facility]     2. Dyslipidemia  CBC Auto Differential    Comprehensive Metabolic Panel    Lipid Panel   3. Chronic obstructive pulmonary disease with acute exacerbation (HCC)  CBC Auto Differential    Comprehensive Metabolic Panel    Lipid Panel   4. Prostate cancer screening  CBC Auto Differential    Comprehensive Metabolic Panel    Lipid Panel    Psa screening   5. Pressure injury of sacral region, stage 1           Plan:      No follow-ups on file.     Orders Placed This Encounter   Procedures    INFLUENZA, QUADV, ADJUVANTED, 72 YRS =, IM, PF, PREFILL SYR, 0.5ML (FLUAD)    CBC Auto Differential     Standing Status:   Future     Standing Expiration Date:   9/22/2021    Comprehensive Metabolic Panel     Standing Status:   Future     Standing Expiration Date:   9/22/2021    Lipid Panel     Standing Status:   Future     Standing Expiration Date:   9/22/2021     Order Specific Question:   Is Patient Fasting?/# of Hours     Answer:   12 hour fast    Psa screening     Standing Status:   Future Standing Expiration Date:   9/22/2021     Orders Placed This Encounter   Medications    tamsulosin (FLOMAX) 0.4 MG capsule     Sig: Take 1 capsule by mouth daily     Dispense:  90 capsule     Refill:  3       Flu vaccine  Update labs  Pressure distribution with prolonged sitting. He plans on seeing a physician that family members go to upon my leaving practice in November. He may see us prn.

## 2020-09-25 ENCOUNTER — HOSPITAL ENCOUNTER (OUTPATIENT)
Age: 85
Setting detail: SPECIMEN
Discharge: HOME | End: 2020-09-25
Payer: MEDICARE

## 2020-09-25 LAB
ABSOLUTE EOS #: 0.05 K/UL (ref 0–0.4)
ABSOLUTE IMMATURE GRANULOCYTE: 0.05 K/UL (ref 0–0.3)
ABSOLUTE LYMPH #: 1.45 K/UL (ref 1–4.8)
ABSOLUTE MONO #: 0.35 K/UL (ref 0.1–0.8)
ALBUMIN SERPL-MCNC: 3.7 G/DL (ref 3.5–5.2)
ALBUMIN/GLOBULIN RATIO: 1.2 (ref 1–2.5)
ALP BLD-CCNC: 61 U/L (ref 40–129)
ALT SERPL-CCNC: 17 U/L (ref 5–41)
ANION GAP SERPL CALCULATED.3IONS-SCNC: 12 MMOL/L (ref 9–17)
AST SERPL-CCNC: 15 U/L
BASOPHILS # BLD: 0 % (ref 0–2)
BASOPHILS ABSOLUTE: 0 K/UL (ref 0–0.2)
BILIRUB SERPL-MCNC: 0.74 MG/DL (ref 0.3–1.2)
BUN BLDV-MCNC: 12 MG/DL (ref 8–23)
BUN/CREAT BLD: ABNORMAL (ref 9–20)
CALCIUM SERPL-MCNC: 8.7 MG/DL (ref 8.6–10.4)
CHLORIDE BLD-SCNC: 93 MMOL/L (ref 98–107)
CO2: 30 MMOL/L (ref 20–31)
CREAT SERPL-MCNC: 0.59 MG/DL (ref 0.7–1.2)
DIFFERENTIAL TYPE: ABNORMAL
EOSINOPHILS RELATIVE PERCENT: 1 % (ref 1–4)
GFR AFRICAN AMERICAN: >60 ML/MIN
GFR NON-AFRICAN AMERICAN: >60 ML/MIN
GFR SERPL CREATININE-BSD FRML MDRD: ABNORMAL ML/MIN/{1.73_M2}
GFR SERPL CREATININE-BSD FRML MDRD: ABNORMAL ML/MIN/{1.73_M2}
GLUCOSE BLD-MCNC: 95 MG/DL (ref 70–99)
HCT VFR BLD CALC: 46.4 % (ref 40.7–50.3)
HEMOGLOBIN: 14.9 G/DL (ref 13–17)
IMMATURE GRANULOCYTES: 1 %
LYMPHOCYTES # BLD: 29 % (ref 24–44)
MCH RBC QN AUTO: 29 PG (ref 25.2–33.5)
MCHC RBC AUTO-ENTMCNC: 32.1 G/DL (ref 28.4–34.8)
MCV RBC AUTO: 90.4 FL (ref 82.6–102.9)
MONOCYTES # BLD: 7 % (ref 1–7)
MORPHOLOGY: ABNORMAL
NRBC AUTOMATED: 0 PER 100 WBC
PDW BLD-RTO: 15.9 % (ref 11.8–14.4)
PLATELET # BLD: ABNORMAL K/UL (ref 138–453)
PLATELET ESTIMATE: ABNORMAL
PLATELET, FLUORESCENCE: 121 K/UL (ref 138–453)
PLATELET, IMMATURE FRACTION: 7.5 % (ref 1.1–10.3)
PMV BLD AUTO: ABNORMAL FL (ref 8.1–13.5)
POTASSIUM SERPL-SCNC: 4.3 MMOL/L (ref 3.7–5.3)
PROSTATE SPECIFIC ANTIGEN: 1.53 UG/L
RBC # BLD: 5.13 M/UL (ref 4.21–5.77)
RBC # BLD: ABNORMAL 10*6/UL
SEG NEUTROPHILS: 62 % (ref 36–66)
SEGMENTED NEUTROPHILS ABSOLUTE COUNT: 3.1 K/UL (ref 1.8–7.7)
SODIUM BLD-SCNC: 135 MMOL/L (ref 135–144)
TOTAL PROTEIN: 6.7 G/DL (ref 6.4–8.3)
WBC # BLD: 5 K/UL (ref 3.5–11.3)
WBC # BLD: ABNORMAL 10*3/UL

## 2020-09-26 LAB
CHOLESTEROL/HDL RATIO: 3
CHOLESTEROL: 164 MG/DL
HDLC SERPL-MCNC: 55 MG/DL
LDL CHOLESTEROL: 90 MG/DL (ref 0–130)
TRIGL SERPL-MCNC: 93 MG/DL
VLDLC SERPL CALC-MCNC: NORMAL MG/DL (ref 1–30)

## 2020-11-06 ENCOUNTER — HOSPITAL ENCOUNTER (OUTPATIENT)
Dept: MEDSURG UNIT | Age: 85
Discharge: NURSING, REHAB OR LONG-TERM CARE | End: 2020-11-06
Attending: FAMILY MEDICINE | Admitting: FAMILY MEDICINE

## 2020-11-11 LAB
CHLORIDE, UR: 35 MMOL/L
CREATININE URINE: 57.4 MG/DL (ref 39–259)
OSMOLALITY URINE: 715 MOSM/KG (ref 80–1300)
SERUM OSMOLALITY: 297 MOSM/KG (ref 275–295)
SODIUM,UR: 45 MMOL/L
URIC ACID: 2.4 MG/DL (ref 3.4–7)

## 2020-11-11 PROCEDURE — 83935 ASSAY OF URINE OSMOLALITY: CPT

## 2020-11-11 PROCEDURE — 82570 ASSAY OF URINE CREATININE: CPT

## 2020-11-11 PROCEDURE — 83930 ASSAY OF BLOOD OSMOLALITY: CPT

## 2020-11-11 PROCEDURE — 82436 ASSAY OF URINE CHLORIDE: CPT

## 2020-11-11 PROCEDURE — 84550 ASSAY OF BLOOD/URIC ACID: CPT

## 2020-11-11 PROCEDURE — 84300 ASSAY OF URINE SODIUM: CPT

## 2020-11-12 LAB
CORTISOL COLLECTION INFO: NORMAL
CORTISOL: 6.1 UG/DL (ref 2.7–18.4)
TSH SERPL DL<=0.05 MIU/L-ACNC: 0.45 MIU/L (ref 0.3–5)

## 2020-11-12 PROCEDURE — 84166 PROTEIN E-PHORESIS/URINE/CSF: CPT

## 2020-11-12 PROCEDURE — 84156 ASSAY OF PROTEIN URINE: CPT

## 2020-11-12 PROCEDURE — 86335 IMMUNFIX E-PHORSIS/URINE/CSF: CPT

## 2020-11-12 PROCEDURE — 84443 ASSAY THYROID STIM HORMONE: CPT

## 2020-11-12 PROCEDURE — 82533 TOTAL CORTISOL: CPT

## 2020-11-13 LAB
ANION GAP SERPL CALCULATED.3IONS-SCNC: 4 MMOL/L (ref 9–17)
CHLORIDE BLD-SCNC: 89 MMOL/L (ref 98–107)
CHOLESTEROL/HDL RATIO: 2.1
CHOLESTEROL: 163 MG/DL
CO2: 40 MMOL/L (ref 20–31)
HDLC SERPL-MCNC: 77 MG/DL
LDL CHOLESTEROL: 70 MG/DL (ref 0–130)
POTASSIUM SERPL-SCNC: 5 MMOL/L (ref 3.7–5.3)
SODIUM BLD-SCNC: 133 MMOL/L (ref 135–144)
TRIGL SERPL-MCNC: 79 MG/DL
VLDLC SERPL CALC-MCNC: NORMAL MG/DL (ref 1–30)

## 2020-11-13 PROCEDURE — 80061 LIPID PANEL: CPT

## 2020-11-13 PROCEDURE — 86334 IMMUNOFIX E-PHORESIS SERUM: CPT

## 2020-11-13 PROCEDURE — 84165 PROTEIN E-PHORESIS SERUM: CPT

## 2020-11-13 PROCEDURE — 84155 ASSAY OF PROTEIN SERUM: CPT

## 2020-11-13 PROCEDURE — 80051 ELECTROLYTE PANEL: CPT

## 2020-11-16 PROCEDURE — 87086 URINE CULTURE/COLONY COUNT: CPT

## 2020-11-16 PROCEDURE — 82140 ASSAY OF AMMONIA: CPT

## 2020-11-17 LAB
ALBUMIN (CALCULATED): 3.5 G/DL (ref 3.2–5.2)
ALBUMIN PERCENT: 65 % (ref 45–65)
ALPHA 1 PERCENT: 2 % (ref 3–6)
ALPHA 2 PERCENT: 9 % (ref 6–13)
ALPHA-1-GLOBULIN: 0.1 G/DL (ref 0.1–0.4)
ALPHA-2-GLOBULIN: 0.5 G/DL (ref 0.5–0.9)
BETA GLOBULIN: 0.5 G/DL (ref 0.5–1.1)
BETA PERCENT: 9 % (ref 11–19)
CULTURE: NO GROWTH
GAMMA GLOBULIN %: 15 % (ref 9–20)
GAMMA GLOBULIN: 0.8 G/DL (ref 0.5–1.5)
Lab: NORMAL
P E INTERPRETATION, U: NORMAL
PATHOLOGIST: ABNORMAL
PATHOLOGIST: NORMAL
PATHOLOGIST: NORMAL
PROTEIN ELECTROPHORESIS, SERUM: ABNORMAL
SERUM IFX INTERP: NORMAL
SPECIMEN DESCRIPTION: NORMAL
SPECIMEN TYPE: NORMAL
TOTAL PROT. SUM,%: 100 % (ref 98–102)
TOTAL PROT. SUM: 5.4 G/DL (ref 6.3–8.2)
TOTAL PROTEIN: 5.4 G/DL (ref 6.4–8.3)
URINE IFX INTERP: NORMAL
URINE IFX SPECIMEN: NORMAL
URINE TOTAL PROTEIN: 15 MG/DL
URINE TOTAL PROTEIN: 15 MG/DL
VOLUME: NORMAL ML

## 2020-11-21 PROCEDURE — U0003 INFECTIOUS AGENT DETECTION BY NUCLEIC ACID (DNA OR RNA); SEVERE ACUTE RESPIRATORY SYNDROME CORONAVIRUS 2 (SARS-COV-2) (CORONAVIRUS DISEASE [COVID-19]), AMPLIFIED PROBE TECHNIQUE, MAKING USE OF HIGH THROUGHPUT TECHNOLOGIES AS DESCRIBED BY CMS-2020-01-R: HCPCS

## 2020-11-24 LAB — SARS-COV-2, NAA: NOT DETECTED

## 2020-12-07 ENCOUNTER — HOSPITAL ENCOUNTER (OUTPATIENT)
Age: 85
Setting detail: SPECIMEN
Discharge: HOME | End: 2020-12-07
Payer: MEDICARE

## 2020-12-07 LAB
HCT VFR BLD CALC: 40.2 % (ref 40.7–50.3)
HEMOGLOBIN: 12.3 G/DL (ref 13–17)
MCH RBC QN AUTO: 29.4 PG (ref 25.2–33.5)
MCHC RBC AUTO-ENTMCNC: 30.6 G/DL (ref 28.4–34.8)
MCV RBC AUTO: 95.9 FL (ref 82.6–102.9)
NRBC AUTOMATED: 0 PER 100 WBC
PDW BLD-RTO: 15.8 % (ref 11.8–14.4)
PLATELET # BLD: 153 K/UL (ref 138–453)
PMV BLD AUTO: 11.4 FL (ref 8.1–13.5)
RBC # BLD: 4.19 M/UL (ref 4.21–5.77)
WBC # BLD: 10.3 K/UL (ref 3.5–11.3)

## 2020-12-07 PROCEDURE — 85027 COMPLETE CBC AUTOMATED: CPT

## 2020-12-07 PROCEDURE — 36415 COLL VENOUS BLD VENIPUNCTURE: CPT

## 2020-12-07 PROCEDURE — P9603 ONE-WAY ALLOW PRORATED MILES: HCPCS

## 2020-12-08 ENCOUNTER — HOSPITAL ENCOUNTER (OUTPATIENT)
Age: 85
Setting detail: SPECIMEN
Discharge: HOME | End: 2020-12-08
Payer: MEDICARE

## 2020-12-08 LAB
-: ABNORMAL
AMORPHOUS: ABNORMAL
BACTERIA: ABNORMAL
BILIRUBIN URINE: NEGATIVE
CASTS UA: ABNORMAL /LPF (ref 0–2)
COLOR: ABNORMAL
COMMENT UA: ABNORMAL
CRYSTALS, UA: ABNORMAL /HPF
CRYSTALS, UA: ABNORMAL /HPF
EPITHELIAL CELLS UA: ABNORMAL /HPF (ref 0–5)
GLUCOSE URINE: NEGATIVE
KETONES, URINE: NEGATIVE
LEUKOCYTE ESTERASE, URINE: ABNORMAL
MUCUS: ABNORMAL
NITRITE, URINE: POSITIVE
OTHER OBSERVATIONS UA: ABNORMAL
PH UA: 6.5 (ref 5–8)
PROTEIN UA: ABNORMAL
RBC UA: ABNORMAL /HPF (ref 0–2)
RENAL EPITHELIAL, UA: ABNORMAL /HPF
SPECIFIC GRAVITY UA: 1.01 (ref 1–1.03)
TRICHOMONAS: ABNORMAL
TURBIDITY: ABNORMAL
URINE HGB: ABNORMAL
UROBILINOGEN, URINE: NORMAL
WBC UA: ABNORMAL /HPF (ref 0–5)
YEAST: ABNORMAL

## 2020-12-08 PROCEDURE — 87186 SC STD MICRODIL/AGAR DIL: CPT

## 2020-12-08 PROCEDURE — 81001 URINALYSIS AUTO W/SCOPE: CPT

## 2020-12-08 PROCEDURE — 87077 CULTURE AEROBIC IDENTIFY: CPT

## 2020-12-08 PROCEDURE — 87086 URINE CULTURE/COLONY COUNT: CPT

## 2020-12-09 ENCOUNTER — HOSPITAL ENCOUNTER (OUTPATIENT)
Age: 85
Setting detail: SPECIMEN
Discharge: HOME | End: 2020-12-09
Payer: MEDICARE

## 2020-12-09 LAB
ANION GAP SERPL CALCULATED.3IONS-SCNC: 7 MMOL/L (ref 9–17)
BUN BLDV-MCNC: 12 MG/DL (ref 8–23)
BUN/CREAT BLD: ABNORMAL (ref 9–20)
CALCIUM SERPL-MCNC: 8.3 MG/DL (ref 8.6–10.4)
CHLORIDE BLD-SCNC: 98 MMOL/L (ref 98–107)
CO2: 35 MMOL/L (ref 20–31)
CREAT SERPL-MCNC: 0.44 MG/DL (ref 0.7–1.2)
CULTURE: ABNORMAL
GFR AFRICAN AMERICAN: >60 ML/MIN
GFR NON-AFRICAN AMERICAN: >60 ML/MIN
GFR SERPL CREATININE-BSD FRML MDRD: ABNORMAL ML/MIN/{1.73_M2}
GFR SERPL CREATININE-BSD FRML MDRD: ABNORMAL ML/MIN/{1.73_M2}
GLUCOSE BLD-MCNC: 87 MG/DL (ref 70–99)
Lab: ABNORMAL
POTASSIUM SERPL-SCNC: 4.3 MMOL/L (ref 3.7–5.3)
SODIUM BLD-SCNC: 140 MMOL/L (ref 135–144)
SPECIMEN DESCRIPTION: ABNORMAL

## 2020-12-09 PROCEDURE — 36415 COLL VENOUS BLD VENIPUNCTURE: CPT

## 2020-12-09 PROCEDURE — 80048 BASIC METABOLIC PNL TOTAL CA: CPT

## 2020-12-09 PROCEDURE — P9603 ONE-WAY ALLOW PRORATED MILES: HCPCS

## 2020-12-30 ENCOUNTER — HOSPITAL ENCOUNTER (INPATIENT)
Age: 85
LOS: 14 days | Discharge: ANOTHER HOSPITAL | DRG: 871 | End: 2021-01-13
Attending: EMERGENCY MEDICINE | Admitting: INTERNAL MEDICINE
Payer: MEDICARE

## 2020-12-30 ENCOUNTER — APPOINTMENT (OUTPATIENT)
Dept: GENERAL RADIOLOGY | Facility: CLINIC | Age: 85
DRG: 871 | End: 2020-12-30
Payer: MEDICARE

## 2020-12-30 DIAGNOSIS — J44.1 CHRONIC OBSTRUCTIVE PULMONARY DISEASE WITH ACUTE EXACERBATION (HCC): ICD-10-CM

## 2020-12-30 DIAGNOSIS — J96.00 ACUTE RESPIRATORY FAILURE DUE TO COVID-19 (HCC): ICD-10-CM

## 2020-12-30 DIAGNOSIS — J18.9 PNEUMONIA DUE TO ORGANISM: ICD-10-CM

## 2020-12-30 DIAGNOSIS — J84.10 PULMONARY FIBROSIS (HCC): ICD-10-CM

## 2020-12-30 DIAGNOSIS — U07.1 ACUTE RESPIRATORY FAILURE DUE TO COVID-19 (HCC): ICD-10-CM

## 2020-12-30 DIAGNOSIS — U07.1 COVID-19: Primary | ICD-10-CM

## 2020-12-30 DIAGNOSIS — A41.89 SEPSIS DUE TO COVID-19 (HCC): ICD-10-CM

## 2020-12-30 DIAGNOSIS — J96.21 ACUTE ON CHRONIC RESPIRATORY FAILURE WITH HYPOXIA (HCC): ICD-10-CM

## 2020-12-30 DIAGNOSIS — U07.1 SEPSIS DUE TO COVID-19 (HCC): ICD-10-CM

## 2020-12-30 PROBLEM — R65.10 SIRS (SYSTEMIC INFLAMMATORY RESPONSE SYNDROME) (HCC): Status: ACTIVE | Noted: 2020-12-30

## 2020-12-30 PROBLEM — N40.0 BENIGN PROSTATIC HYPERPLASIA: Status: ACTIVE | Noted: 2020-11-06

## 2020-12-30 PROBLEM — J12.82 PNEUMONIA DUE TO COVID-19 VIRUS: Status: ACTIVE | Noted: 2020-12-30

## 2020-12-30 PROBLEM — F41.9 ANXIETY: Status: ACTIVE | Noted: 2020-11-06

## 2020-12-30 PROBLEM — J96.20 ACUTE-ON-CHRONIC RESPIRATORY FAILURE (HCC): Status: ACTIVE | Noted: 2020-11-06

## 2020-12-30 PROBLEM — J12.82 2019 NOVEL CORONAVIRUS-INFECTED PNEUMONIA (NCIP): Status: ACTIVE | Noted: 2020-12-30

## 2020-12-30 LAB
ABSOLUTE EOS #: 0 K/UL (ref 0–0.4)
ABSOLUTE IMMATURE GRANULOCYTE: ABNORMAL K/UL (ref 0–0.3)
ABSOLUTE LYMPH #: 2.45 K/UL (ref 1–4.8)
ABSOLUTE MONO #: 1.55 K/UL (ref 0.1–0.8)
ALBUMIN SERPL-MCNC: 3.2 G/DL (ref 3.5–5.2)
ALBUMIN SERPL-MCNC: 3.6 G/DL (ref 3.5–5.2)
ALBUMIN/GLOBULIN RATIO: 1.3 (ref 1–2.5)
ALBUMIN/GLOBULIN RATIO: ABNORMAL (ref 1–2.5)
ALP BLD-CCNC: 46 U/L (ref 40–129)
ALP BLD-CCNC: 50 U/L (ref 40–129)
ALT SERPL-CCNC: 17 U/L (ref 5–41)
ALT SERPL-CCNC: 18 U/L (ref 5–41)
ANION GAP SERPL CALCULATED.3IONS-SCNC: 10 MMOL/L (ref 9–17)
AST SERPL-CCNC: 16 U/L
AST SERPL-CCNC: 17 U/L
BASOPHILS # BLD: 0 % (ref 0–2)
BASOPHILS ABSOLUTE: 0 K/UL (ref 0–0.2)
BILIRUB SERPL-MCNC: 0.31 MG/DL (ref 0.3–1.2)
BILIRUB SERPL-MCNC: 0.4 MG/DL (ref 0.3–1.2)
BILIRUBIN DIRECT: 0.12 MG/DL
BILIRUBIN, INDIRECT: 0.19 MG/DL (ref 0–1)
BNP INTERPRETATION: ABNORMAL
BUN BLDV-MCNC: 14 MG/DL (ref 8–23)
BUN/CREAT BLD: ABNORMAL (ref 9–20)
C-REACTIVE PROTEIN: 71.9 MG/L (ref 0–5)
CALCIUM SERPL-MCNC: 8.9 MG/DL (ref 8.6–10.4)
CHLORIDE BLD-SCNC: 89 MMOL/L (ref 98–107)
CO2: 35 MMOL/L (ref 20–31)
CREAT SERPL-MCNC: 0.4 MG/DL (ref 0.7–1.2)
D-DIMER QUANTITATIVE: 0.48 MG/L FEU (ref 0–0.59)
DIFFERENTIAL TYPE: ABNORMAL
DIRECT EXAM: NORMAL
EOSINOPHILS RELATIVE PERCENT: 0 % (ref 1–4)
FIBRINOGEN: 371 MG/DL (ref 179–518)
GFR AFRICAN AMERICAN: >60 ML/MIN
GFR NON-AFRICAN AMERICAN: >60 ML/MIN
GFR SERPL CREATININE-BSD FRML MDRD: ABNORMAL ML/MIN/{1.73_M2}
GFR SERPL CREATININE-BSD FRML MDRD: ABNORMAL ML/MIN/{1.73_M2}
GLOBULIN: ABNORMAL G/DL (ref 1.5–3.8)
GLUCOSE BLD-MCNC: 156 MG/DL (ref 70–99)
HCT VFR BLD CALC: 36.6 % (ref 41–53)
HEMOGLOBIN: 12 G/DL (ref 13.5–17.5)
IMMATURE GRANULOCYTES: ABNORMAL %
LACTIC ACID: 3.2 MMOL/L (ref 0.5–2.2)
LACTIC ACID: 3.2 MMOL/L (ref 0.5–2.2)
LYMPHOCYTES # BLD: 19 % (ref 24–44)
Lab: NORMAL
MAGNESIUM: 2 MG/DL (ref 1.6–2.6)
MCH RBC QN AUTO: 29.9 PG (ref 26–34)
MCHC RBC AUTO-ENTMCNC: 32.8 G/DL (ref 31–37)
MCV RBC AUTO: 91.3 FL (ref 80–100)
MONOCYTES # BLD: 12 % (ref 1–7)
MORPHOLOGY: ABNORMAL
MORPHOLOGY: ABNORMAL
NRBC AUTOMATED: ABNORMAL PER 100 WBC
PDW BLD-RTO: 15.9 % (ref 12.5–15.4)
PLATELET # BLD: 111 K/UL (ref 140–450)
PLATELET ESTIMATE: ABNORMAL
PMV BLD AUTO: 9.6 FL (ref 6–12)
POTASSIUM SERPL-SCNC: 4 MMOL/L (ref 3.7–5.3)
PRO-BNP: 842 PG/ML
PROCALCITONIN: 0.16 NG/ML
RBC # BLD: 4.01 M/UL (ref 4.5–5.9)
RBC # BLD: ABNORMAL 10*6/UL
SARS-COV-2, RAPID: DETECTED
SARS-COV-2: ABNORMAL
SARS-COV-2: ABNORMAL
SEG NEUTROPHILS: 69 % (ref 36–66)
SEGMENTED NEUTROPHILS ABSOLUTE COUNT: 8.9 K/UL (ref 1.8–7.7)
SODIUM BLD-SCNC: 134 MMOL/L (ref 135–144)
SOURCE: ABNORMAL
SPECIMEN DESCRIPTION: NORMAL
TOTAL PROTEIN: 5.7 G/DL (ref 6.4–8.3)
TOTAL PROTEIN: 6.4 G/DL (ref 6.4–8.3)
TROPONIN INTERP: ABNORMAL
TROPONIN INTERP: ABNORMAL
TROPONIN T: ABNORMAL NG/ML
TROPONIN T: ABNORMAL NG/ML
TROPONIN, HIGH SENSITIVITY: 26 NG/L (ref 0–22)
TROPONIN, HIGH SENSITIVITY: 34 NG/L (ref 0–22)
WBC # BLD: 12.9 K/UL (ref 3.5–11)
WBC # BLD: ABNORMAL 10*3/UL

## 2020-12-30 PROCEDURE — 83605 ASSAY OF LACTIC ACID: CPT

## 2020-12-30 PROCEDURE — 84484 ASSAY OF TROPONIN QUANT: CPT

## 2020-12-30 PROCEDURE — 80076 HEPATIC FUNCTION PANEL: CPT

## 2020-12-30 PROCEDURE — 87804 INFLUENZA ASSAY W/OPTIC: CPT

## 2020-12-30 PROCEDURE — U0002 COVID-19 LAB TEST NON-CDC: HCPCS

## 2020-12-30 PROCEDURE — 93005 ELECTROCARDIOGRAM TRACING: CPT | Performed by: EMERGENCY MEDICINE

## 2020-12-30 PROCEDURE — 85025 COMPLETE CBC W/AUTO DIFF WBC: CPT

## 2020-12-30 PROCEDURE — 87040 BLOOD CULTURE FOR BACTERIA: CPT

## 2020-12-30 PROCEDURE — 99223 1ST HOSP IP/OBS HIGH 75: CPT | Performed by: INTERNAL MEDICINE

## 2020-12-30 PROCEDURE — 83880 ASSAY OF NATRIURETIC PEPTIDE: CPT

## 2020-12-30 PROCEDURE — 85384 FIBRINOGEN ACTIVITY: CPT

## 2020-12-30 PROCEDURE — 83735 ASSAY OF MAGNESIUM: CPT

## 2020-12-30 PROCEDURE — 84145 PROCALCITONIN (PCT): CPT

## 2020-12-30 PROCEDURE — 2580000003 HC RX 258: Performed by: EMERGENCY MEDICINE

## 2020-12-30 PROCEDURE — 80053 COMPREHEN METABOLIC PANEL: CPT

## 2020-12-30 PROCEDURE — 2580000003 HC RX 258: Performed by: INTERNAL MEDICINE

## 2020-12-30 PROCEDURE — 94660 CPAP INITIATION&MGMT: CPT

## 2020-12-30 PROCEDURE — 36415 COLL VENOUS BLD VENIPUNCTURE: CPT

## 2020-12-30 PROCEDURE — 86140 C-REACTIVE PROTEIN: CPT

## 2020-12-30 PROCEDURE — 6370000000 HC RX 637 (ALT 250 FOR IP): Performed by: NURSE PRACTITIONER

## 2020-12-30 PROCEDURE — 99285 EMERGENCY DEPT VISIT HI MDM: CPT

## 2020-12-30 PROCEDURE — 6360000002 HC RX W HCPCS: Performed by: NURSE PRACTITIONER

## 2020-12-30 PROCEDURE — 2700000000 HC OXYGEN THERAPY PER DAY

## 2020-12-30 PROCEDURE — APPSS45 APP SPLIT SHARED TIME 31-45 MINUTES: Performed by: NURSE PRACTITIONER

## 2020-12-30 PROCEDURE — 85379 FIBRIN DEGRADATION QUANT: CPT

## 2020-12-30 PROCEDURE — XW033E5 INTRODUCTION OF REMDESIVIR ANTI-INFECTIVE INTO PERIPHERAL VEIN, PERCUTANEOUS APPROACH, NEW TECHNOLOGY GROUP 5: ICD-10-PCS | Performed by: INTERNAL MEDICINE

## 2020-12-30 PROCEDURE — 2500000003 HC RX 250 WO HCPCS: Performed by: INTERNAL MEDICINE

## 2020-12-30 PROCEDURE — 2000000000 HC ICU R&B

## 2020-12-30 PROCEDURE — 71045 X-RAY EXAM CHEST 1 VIEW: CPT

## 2020-12-30 PROCEDURE — 94640 AIRWAY INHALATION TREATMENT: CPT

## 2020-12-30 PROCEDURE — 6360000002 HC RX W HCPCS: Performed by: EMERGENCY MEDICINE

## 2020-12-30 PROCEDURE — 96374 THER/PROPH/DIAG INJ IV PUSH: CPT

## 2020-12-30 PROCEDURE — 94761 N-INVAS EAR/PLS OXIMETRY MLT: CPT

## 2020-12-30 PROCEDURE — XW13325 TRANSFUSION OF CONVALESCENT PLASMA (NONAUTOLOGOUS) INTO PERIPHERAL VEIN, PERCUTANEOUS APPROACH, NEW TECHNOLOGY GROUP 5: ICD-10-PCS | Performed by: INTERNAL MEDICINE

## 2020-12-30 RX ORDER — ACETAMINOPHEN 650 MG/1
650 SUPPOSITORY RECTAL EVERY 6 HOURS PRN
Status: DISCONTINUED | OUTPATIENT
Start: 2020-12-30 | End: 2021-01-14 | Stop reason: HOSPADM

## 2020-12-30 RX ORDER — BUDESONIDE 0.5 MG/2ML
1 INHALANT ORAL 2 TIMES DAILY
Status: DISCONTINUED | OUTPATIENT
Start: 2020-12-30 | End: 2021-01-08

## 2020-12-30 RX ORDER — FLUTICASONE PROPIONATE 50 MCG
2 SPRAY, SUSPENSION (ML) NASAL DAILY
Status: DISCONTINUED | OUTPATIENT
Start: 2020-12-30 | End: 2021-01-14 | Stop reason: HOSPADM

## 2020-12-30 RX ORDER — POLYETHYLENE GLYCOL 3350 17 G/17G
17 POWDER, FOR SOLUTION ORAL DAILY PRN
Status: DISCONTINUED | OUTPATIENT
Start: 2020-12-30 | End: 2021-01-03 | Stop reason: SDUPTHER

## 2020-12-30 RX ORDER — PROMETHAZINE HYDROCHLORIDE 12.5 MG/1
12.5 TABLET ORAL EVERY 6 HOURS PRN
Status: DISCONTINUED | OUTPATIENT
Start: 2020-12-30 | End: 2021-01-14 | Stop reason: HOSPADM

## 2020-12-30 RX ORDER — 0.9 % SODIUM CHLORIDE 0.9 %
1000 INTRAVENOUS SOLUTION INTRAVENOUS ONCE
Status: COMPLETED | OUTPATIENT
Start: 2020-12-30 | End: 2020-12-30

## 2020-12-30 RX ORDER — TAMSULOSIN HYDROCHLORIDE 0.4 MG/1
0.4 CAPSULE ORAL DAILY
Status: DISCONTINUED | OUTPATIENT
Start: 2020-12-30 | End: 2021-01-14 | Stop reason: HOSPADM

## 2020-12-30 RX ORDER — LEVOFLOXACIN 5 MG/ML
500 INJECTION, SOLUTION INTRAVENOUS ONCE
Status: COMPLETED | OUTPATIENT
Start: 2020-12-30 | End: 2020-12-30

## 2020-12-30 RX ORDER — ARFORMOTEROL TARTRATE 15 UG/2ML
15 SOLUTION RESPIRATORY (INHALATION) 2 TIMES DAILY
Status: DISCONTINUED | OUTPATIENT
Start: 2020-12-30 | End: 2021-01-14 | Stop reason: HOSPADM

## 2020-12-30 RX ORDER — ACETAMINOPHEN 325 MG/1
650 TABLET ORAL EVERY 6 HOURS PRN
Status: DISCONTINUED | OUTPATIENT
Start: 2020-12-30 | End: 2021-01-14 | Stop reason: HOSPADM

## 2020-12-30 RX ORDER — DEXAMETHASONE SODIUM PHOSPHATE 10 MG/ML
10 INJECTION, SOLUTION INTRAMUSCULAR; INTRAVENOUS ONCE
Status: COMPLETED | OUTPATIENT
Start: 2020-12-30 | End: 2020-12-30

## 2020-12-30 RX ORDER — ZINC SULFATE 50(220)MG
50 CAPSULE ORAL DAILY
Status: DISPENSED | OUTPATIENT
Start: 2020-12-30 | End: 2021-01-13

## 2020-12-30 RX ORDER — ASPIRIN 81 MG/1
81 TABLET ORAL DAILY
Status: DISCONTINUED | OUTPATIENT
Start: 2020-12-30 | End: 2021-01-14 | Stop reason: HOSPADM

## 2020-12-30 RX ORDER — SODIUM CHLORIDE 9 MG/ML
INJECTION, SOLUTION INTRAVENOUS PRN
Status: DISCONTINUED | OUTPATIENT
Start: 2020-12-30 | End: 2021-01-14 | Stop reason: HOSPADM

## 2020-12-30 RX ORDER — POTASSIUM CHLORIDE 7.45 MG/ML
10 INJECTION INTRAVENOUS PRN
Status: DISCONTINUED | OUTPATIENT
Start: 2020-12-30 | End: 2021-01-14 | Stop reason: HOSPADM

## 2020-12-30 RX ORDER — ALBUTEROL SULFATE 2.5 MG/3ML
2.5 SOLUTION RESPIRATORY (INHALATION) EVERY 6 HOURS PRN
Status: DISCONTINUED | OUTPATIENT
Start: 2020-12-30 | End: 2021-01-14 | Stop reason: HOSPADM

## 2020-12-30 RX ORDER — POTASSIUM CHLORIDE 20 MEQ/1
40 TABLET, EXTENDED RELEASE ORAL PRN
Status: DISCONTINUED | OUTPATIENT
Start: 2020-12-30 | End: 2021-01-14 | Stop reason: HOSPADM

## 2020-12-30 RX ORDER — ALPRAZOLAM 1 MG/1
1 TABLET ORAL 4 TIMES DAILY PRN
Status: DISCONTINUED | OUTPATIENT
Start: 2020-12-30 | End: 2021-01-02

## 2020-12-30 RX ORDER — FAMOTIDINE 20 MG/1
40 TABLET, FILM COATED ORAL DAILY
Status: DISCONTINUED | OUTPATIENT
Start: 2020-12-30 | End: 2021-01-14 | Stop reason: HOSPADM

## 2020-12-30 RX ORDER — SODIUM CHLORIDE 0.9 % (FLUSH) 0.9 %
10 SYRINGE (ML) INJECTION EVERY 12 HOURS SCHEDULED
Status: DISCONTINUED | OUTPATIENT
Start: 2020-12-30 | End: 2021-01-14 | Stop reason: HOSPADM

## 2020-12-30 RX ORDER — VITAMIN B COMPLEX
6000 TABLET ORAL DAILY
Status: DISCONTINUED | OUTPATIENT
Start: 2020-12-30 | End: 2021-01-14 | Stop reason: HOSPADM

## 2020-12-30 RX ORDER — GUAIFENESIN/DEXTROMETHORPHAN 100-10MG/5
5 SYRUP ORAL EVERY 4 HOURS PRN
Status: DISCONTINUED | OUTPATIENT
Start: 2020-12-30 | End: 2021-01-14 | Stop reason: HOSPADM

## 2020-12-30 RX ORDER — NICOTINE 21 MG/24HR
1 PATCH, TRANSDERMAL 24 HOURS TRANSDERMAL DAILY PRN
Status: DISCONTINUED | OUTPATIENT
Start: 2020-12-30 | End: 2021-01-14 | Stop reason: HOSPADM

## 2020-12-30 RX ORDER — 0.9 % SODIUM CHLORIDE 0.9 %
30 INTRAVENOUS SOLUTION INTRAVENOUS PRN
Status: DISCONTINUED | OUTPATIENT
Start: 2020-12-30 | End: 2021-01-14 | Stop reason: HOSPADM

## 2020-12-30 RX ORDER — DEXAMETHASONE 4 MG/1
6 TABLET ORAL DAILY
Status: COMPLETED | OUTPATIENT
Start: 2020-12-31 | End: 2021-01-09

## 2020-12-30 RX ORDER — ONDANSETRON 2 MG/ML
4 INJECTION INTRAMUSCULAR; INTRAVENOUS EVERY 6 HOURS PRN
Status: DISCONTINUED | OUTPATIENT
Start: 2020-12-30 | End: 2021-01-14 | Stop reason: HOSPADM

## 2020-12-30 RX ORDER — FLUTICASONE PROPIONATE 220 UG/1
1 AEROSOL, METERED RESPIRATORY (INHALATION) 2 TIMES DAILY
Status: DISCONTINUED | OUTPATIENT
Start: 2020-12-30 | End: 2020-12-30

## 2020-12-30 RX ORDER — MAGNESIUM SULFATE 1 G/100ML
1 INJECTION INTRAVENOUS PRN
Status: DISCONTINUED | OUTPATIENT
Start: 2020-12-30 | End: 2021-01-14 | Stop reason: HOSPADM

## 2020-12-30 RX ORDER — SODIUM CHLORIDE 0.9 % (FLUSH) 0.9 %
10 SYRINGE (ML) INJECTION PRN
Status: DISCONTINUED | OUTPATIENT
Start: 2020-12-30 | End: 2021-01-14 | Stop reason: HOSPADM

## 2020-12-30 RX ADMIN — ENOXAPARIN SODIUM 30 MG: 30 INJECTION SUBCUTANEOUS at 19:58

## 2020-12-30 RX ADMIN — DEXAMETHASONE SODIUM PHOSPHATE 10 MG: 10 INJECTION, SOLUTION INTRAMUSCULAR; INTRAVENOUS at 12:02

## 2020-12-30 RX ADMIN — Medication 6000 UNITS: at 18:16

## 2020-12-30 RX ADMIN — FLUTICASONE PROPIONATE 2 SPRAY: 50 SPRAY, METERED NASAL at 18:15

## 2020-12-30 RX ADMIN — FAMOTIDINE 40 MG: 20 TABLET, FILM COATED ORAL at 19:58

## 2020-12-30 RX ADMIN — Medication 50 MG: at 18:16

## 2020-12-30 RX ADMIN — REMDESIVIR 200 MG: 100 INJECTION, POWDER, LYOPHILIZED, FOR SOLUTION INTRAVENOUS at 18:34

## 2020-12-30 RX ADMIN — ASPIRIN 81 MG: 81 TABLET, COATED ORAL at 18:15

## 2020-12-30 RX ADMIN — TAMSULOSIN HYDROCHLORIDE 0.4 MG: 0.4 CAPSULE ORAL at 19:58

## 2020-12-30 RX ADMIN — SODIUM CHLORIDE 1000 ML: 9 INJECTION, SOLUTION INTRAVENOUS at 12:02

## 2020-12-30 RX ADMIN — LEVOFLOXACIN 500 MG: 5 INJECTION, SOLUTION INTRAVENOUS at 12:50

## 2020-12-30 ASSESSMENT — ENCOUNTER SYMPTOMS
SHORTNESS OF BREATH: 1
SINUS PAIN: 0
SINUS PRESSURE: 0
COLOR CHANGE: 0
SORE THROAT: 0
PHOTOPHOBIA: 0
ABDOMINAL DISTENTION: 0
ABDOMINAL PAIN: 0
DIARRHEA: 0
VOMITING: 0
COUGH: 1

## 2020-12-30 ASSESSMENT — PAIN SCALES - GENERAL: PAINLEVEL_OUTOF10: 0

## 2020-12-30 NOTE — H&P
[facility]  Office: [address], DO, Jena Ruano, DO, Lori Jarrett, DO, Breezy Harrisonple Blood, DO, Dana Jones MD, Vicky Machado MD, Sandrine Moreno MD, Liane Da Silva MD, Zane Hernandez MD, Chadd Sandoval MD, Lenore Daniels MD, Omi Lechuga MD, Anuel Cota MD, Satish Meza DO, Merlene Serrano MD, Lyla Castleman, MD, Braeden Gorman DO, Marion Givens MD,  Debi Weiner DO, Jay Can MD, Sandeep Dailey MD, Caitie Overton, [facility], [facility], CNP, Sean Limon, CNP, Gilma Hutchinson, CNS, Holly Tsai, [facility], Amor Brown, [facility], Kulwinder Leiva, CNP, Shaan Cohen, CNP, Frederic Barbosa, CNP, Emerald Melendez PA-C, Cathy Prajapati, [facility], [place], CNP, Rita Banks, CNP, Singh Talavera, CNP, Romain Barrios, CNP, Svetlana Mcguire, [facility]    HISTORY AND PHYSICAL EXAMINATION            Date:   12/30/2020  Patient name:  Enrique Orozco  Date of admission:  12/30/2020 10:47 AM  MRN:   [id number]  Account:  [de-identified]  YOB: 1933  PCP:    Wendy Benjamin MD  Room:   [address] [place] [address]  Code Status:    Full Code    Chief Complaint:     Chief Complaint   Patient presents with    Shortness of Breath     pt states gradually gotten worse over the past few months       History Obtained From:     patient    History of Present Illness:     Enrique Orozco is a 80 y.o. Non-/non  male who presents with Shortness of Breath (pt states gradually gotten worse over the past few months)   and is admitted to the hospital for the management of Pneumonia due to COVID-19 virus. Patient is a resident at assisted living facility. Patient reports that the facility has recently had an outbreak of Covid. Patient reports over the past 3 days he has been experiencing exertional fatigue followed rapidly by a persistent cough with exertional dyspnea over the past 24 hours. Patient reports that the symptoms are present in between his ADLs and he has been having increasing difficulty with breathing throughout the day. Patient reports to the freestanding emergency department by EMS where he was found to be profoundly hypoxic and was started on supplemental oxygen by nonrebreather mask at 15 L. Initial lab testing was completed and finds patient is Covid positive. Patient is admitted to the hospital for acute respiratory failure with hypoxia and will be treated aggressively with standard Covid therapy. Past Medical History:     Past Medical History:   Diagnosis Date    Asthma     [facility]     Pulmonary fibrosis ([facility]         Past Surgical History:     Past Surgical History:   Procedure Laterality Date    PRE-MALIGNANT / BENIGN SKIN LESION EXCISION  09/12/2017    exc bx lesion right side of nose and right neck        Medications Prior to Admission:     Prior to Admission medications    Medication Sig Start Date End Date Taking?  Authorizing Provider   tamsulosin (FLOMAX) 0.4 MG capsule Take 1 capsule by mouth daily 9/22/20   Kennedy Quiñones MD   guaiFENesin (ROBITUSSIN) 100 MG/5ML syrup Take 200 mg by mouth 3 times daily as needed for Cough    Historical Provider, MD   famotidine (PEPCID) 40 MG tablet Take 1 tablet by mouth daily 3/16/17   Historical Provider, MD   Diphenhydramine-APAP, sleep, (TYLENOL PM EXTRA STRENGTH PO) Take 1 tablet by mouth daily    Historical Provider, MD   triamcinolone (NASACORT ALLERGY 24HR) 55 MCG/ACT nasal inhaler 2 sprays by Nasal route daily    Historical Provider, MD ESBRIET 267 MG CAPS Take 9 capsules by mouth daily Pt takes 3 with each meal 12/8/15   Historical Provider, MD   budesonide (PULMICORT) 0.5 MG/2ML nebulizer suspension 1 treatment twice daily 11/20/15   Historical Provider, MD   BROVANA 15 MCG/2ML NEBU 1 treatment twice a day 12/3/15   Historical Provider, MD   albuterol (PROVENTIL HFA;VENTOLIN HFA) 108 (90 BASE) MCG/ACT inhaler Inhale 2 puffs into the lungs every 6 hours as needed for Wheezing. 12/29/14   Elizabeth Schneider MD   aspirin 81 MG tablet Take 81 mg by mouth daily. Historical Provider, MD   multivitamin [facility]) per tablet Take 1 tablet by mouth daily. Historical Provider, MD        Allergies:     Bactrim, Other, and Prednisone    Social History:     Tobacco:    reports that he quit smoking about 53 years ago. He has never used smokeless tobacco.  Alcohol:      reports no history of alcohol use. Drug Use:  reports no history of drug use. Family History:     Family History   Problem Relation Age of Onset    Heart Attack Mother     Heart Attack Father        Review of Systems:     Positive and Negative as described in HPI. Review of Systems   Constitutional: Positive for activity change, fatigue and fever. HENT: Negative for sinus pressure, sinus pain and sore throat. Eyes: Negative for photophobia and visual disturbance. Respiratory: Positive for cough and shortness of breath. Cardiovascular: Negative for chest pain and palpitations. Gastrointestinal: Negative for abdominal distention, abdominal pain and vomiting. Endocrine: Negative for cold intolerance and heat intolerance. Genitourinary: Positive for difficulty urinating. Negative for urgency. Musculoskeletal: Negative for arthralgias and neck pain. Skin: Negative for color change and rash. Allergic/Immunologic: Negative for environmental allergies, food allergies and immunocompromised state. Neurological: Positive for weakness. Negative for speech difficulty and headaches. Hematological: Negative for adenopathy. Does not bruise/bleed easily. Psychiatric/Behavioral: Negative for agitation. The patient is not hyperactive. Physical Exam:   BP (!) 105/47   Pulse 102   Temp 98.2 °F (36.8 °C) (Axillary)   Resp (!) 36   Ht 5' 6\" (1.676 m)   Wt 154 lb (69.9 kg)   SpO2 (!) 83%   BMI 24.86 kg/m²   Temp (24hrs), Av.3 °F (36.8 °C), Min:97.9 °F (36.6 °C), Max:98.6 °F (37 °C)    No results for input(s): POCGLU in the last 72 hours. Intake/Output Summary (Last 24 hours) at 2020 1642  Last data filed at 2020 1556  Gross per 24 hour   Intake    Output 700 ml   Net -700 ml       Physical Exam  Constitutional:       Appearance: Normal appearance. He is cachectic. He is ill-appearing. HENT:      Head: Normocephalic and atraumatic. Nose: Nose normal.      Mouth/Throat:      Mouth: Mucous membranes are dry. Eyes:      Extraocular Movements: Extraocular movements intact. Pupils: Pupils are equal, round, and reactive to light. Cardiovascular:      Rate and Rhythm: Normal rate and regular rhythm. Heart sounds: No murmur. No gallop. Pulmonary:      Effort: Respiratory distress present. Breath sounds: Normal breath sounds. Abdominal:      General: Abdomen is flat. Bowel sounds are normal.      Palpations: Abdomen is soft. Musculoskeletal: Normal range of motion. General: No swelling, tenderness or deformity. Skin:     General: Skin is warm and dry. Capillary Refill: Capillary refill takes 2 to 3 seconds. Coloration: Skin is pale. Neurological:      General: No focal deficit present. Mental Status: He is alert and oriented to person, place, and time. Cranial Nerves: No cranial nerve deficit. Motor: No weakness.    Psychiatric:         Mood and Affect: Mood normal.         Behavior: Behavior normal.         Investigations: Laboratory Testing:  Recent Results (from the past 24 hour(s))   EKG 12 Lead    Collection Time: 12/30/20 10:50 AM   Result Value Ref Range    Ventricular Rate 101 BPM    Atrial Rate 101 BPM    P-R Interval 180 ms    QRS Duration 80 ms    Q-T Interval 278 ms    QTc Calculation (Bazett) 360 ms    P Axis 33 degrees    R Axis 48 degrees    T Axis 19 degrees   CBC Auto Differential    Collection Time: 12/30/20 10:53 AM   Result Value Ref Range    WBC 12.9 (H) 3.5 - 11.0 k/uL    RBC 4.01 (L) 4.5 - 5.9 m/uL    Hemoglobin 12.0 (L) 13.5 - 17.5 g/dL    Hematocrit 36.6 (L) 41 - 53 %    MCV 91.3 80 - 100 fL    MCH 29.9 26 - 34 pg    MCHC 32.8 31 - 37 g/dL    RDW 15.9 (H) 12.5 - 15.4 %    Platelets 498 (L) 893 - 450 k/uL    MPV 9.6 6.0 - 12.0 fL    NRBC Automated NOT REPORTED per 100 WBC    Differential Type NOT REPORTED     Immature Granulocytes NOT REPORTED 0 %    Absolute Immature Granulocyte NOT REPORTED 0.00 - 0.30 k/uL    WBC Morphology NOT REPORTED     RBC Morphology NOT REPORTED     Platelet Estimate NOT REPORTED     Seg Neutrophils 69 (H) 36 - 66 %    Lymphocytes 19 (L) 24 - 44 %    Monocytes 12 (H) 1 - 7 %    Eosinophils % 0 (L) 1 - 4 %    Basophils 0 0 - 2 %    Segs Absolute 8.90 (H) 1.8 - 7.7 k/uL    Absolute Lymph # 2.45 1.0 - 4.8 k/uL    Absolute Mono # 1.55 (H) 0.1 - 0.8 k/uL    Absolute Eos # 0.00 0.0 - 0.4 k/uL    Basophils Absolute 0.00 0.0 - 0.2 k/uL    Morphology ANISOCYTOSIS PRESENT     Morphology INCREASED BANDS PRESENT    Comprehensive Metabolic Panel    Collection Time: 12/30/20 10:53 AM   Result Value Ref Range    Glucose 156 (H) 70 - 99 mg/dL    BUN 14 8 - 23 mg/dL    CREATININE 0.40 (L) 0.70 - 1.20 mg/dL    Bun/Cre Ratio NOT REPORTED 9 - 20    Calcium 8.9 8.6 - 10.4 mg/dL    Sodium 134 (L) 135 - 144 mmol/L    Potassium 4.0 3.7 - 5.3 mmol/L    Chloride 89 (L) 98 - 107 mmol/L    CO2 35 (H) 20 - 31 mmol/L    Anion Gap 10 9 - 17 mmol/L    Alkaline Phosphatase 50 40 - 129 U/L    ALT 18 5 - 41 U/L AST 17 <40 U/L    Total Bilirubin 0.40 0.3 - 1.2 mg/dL    Total Protein 6.4 6.4 - 8.3 g/dL    Alb 3.6 3.5 - 5.2 g/dL    Albumin/Globulin Ratio 1.3 1.0 - 2.5    GFR Non-African American >60 >60 mL/min    GFR African American >60 >60 mL/min    GFR Comment          GFR Staging NOT REPORTED    Lactic Acid    Collection Time: 12/30/20 10:53 AM   Result Value Ref Range    Lactic Acid 3.2 (H) 0.5 - 2.2 mmol/L   Magnesium    Collection Time: 12/30/20 10:53 AM   Result Value Ref Range    Magnesium 2.0 1.6 - 2.6 mg/dL   Troponin    Collection Time: 12/30/20 10:53 AM   Result Value Ref Range    Troponin, High Sensitivity 26 (H) 0 - 22 ng/L    Troponin T NOT REPORTED <0.03 ng/mL    Troponin Interp NOT REPORTED    Brain Natriuretic Peptide    Collection Time: 12/30/20 10:53 AM   Result Value Ref Range    Pro- (H) <300 pg/mL    BNP Interpretation Pro-BNP Reference Range:    Rapid Influenza A/B Antigens    Collection Time: 12/30/20 11:05 AM    Specimen: Nasopharyngeal Swab   Result Value Ref Range    Specimen Description . NASOPHARYNGEAL SWAB     Special Requests NOT REPORTED     Direct Exam       NEGATIVE for Influenza A + B antigens. PCR testing to confirm this result is available upon request.  Specimen will be saved in the laboratory for 7 days. Please call [phone] if PCR testing is indicated. COVID-19    Collection Time: 12/30/20 11:05 AM    Specimen: Other   Result Value Ref Range    SARS-CoV-2          SARS-CoV-2, Rapid DETECTED (A) Not Detected    Source . NASOPHARYNGEAL SWAB     SARS-CoV-2             Imaging/Diagnostics:  Xr Chest Portable    Result Date: 12/30/2020  Patchy diffuse bilateral pulmonary opacification most consistent with multifocal pneumonia including atypical viral pneumonia.        Assessment :      Hospital Problems           Last Modified POA    * (Principal) Pneumonia due to COVID-19 virus 12/30/2020 Yes COPD (chronic obstructive pulmonary disease) ([facility] 12/30/2020 Yes    Dyslipidemia 12/30/2020 Yes    Pulmonary interstitial fibrosis ([facility] 12/30/2020 Yes    Gastroesophageal reflux disease without esophagitis 12/30/2020 Yes    Former smoker 12/30/2020 Yes    Benign prostatic hyperplasia 12/30/2020 Yes    Anxiety 12/30/2020 Yes    Acute-on-chronic respiratory failure ([facility] 12/30/2020 Yes    2019 novel coronavirusinfected pneumonia ([facility]) 12/30/2020 Yes    SIRS (systemic inflammatory response syndrome) ([facility] 12/30/2020 Yes          Plan:     Patient status inpatient in the  Progressive Unit/Step down    1. Initiate remdesivirday 1/5  2. Initiate dexamethasoneday 1/10  3. Type cross and transfuse 1 unit convalescent plasma now scheduled and as needed respiratory therapy as outlined in the orders for pulmonary fibrosis, COPD, Covid  4. Heated high flow O2 as well as adult BiPAP protocol at respiratory discretion  5. Maintain indwelling urinary catheter for BPH with urinary retention as well as Flomax  6. PPI for GERD, Lipitor for dyslipidemia  7. Anxiety management with home medication as well as Xanax  8. Consult pulmonology  9. Add procalcitonin, blood cultures, trend lactate, trend troponin  10. Lovenox 30 twice daily for anticoagulation with Covid    Consultations:   IP CONSULT TO PULMONOLOGY  IP CONSULT TO PHARMACY    Patient is admitted as inpatient status because of co-morbidities listed above, severity of signs and symptoms as outlined, requirement for current medical therapies and most importantly because of direct risk to patient if care not provided in a hospital setting. Expected length of stay > 48 hours.     ARLETH Wallace NP  12/30/2020  4:42 PM    Copy sent to Dr. Alisa Dior MD

## 2020-12-30 NOTE — ED NOTES
Lab contacted to draw blood cultures, will hang IV ATB once they are done     Jamin Do RN  12/30/20 [id number]

## 2020-12-30 NOTE — ED NOTES
Contacting [facility] to facilitate transfer [address], [address] closed for direct admission.       Angel Alvarez RN  12/30/20 [id number]

## 2020-12-30 NOTE — ED PROVIDER NOTES
[facility]  [address] [id number]  Phone: [phone]        [facility] ICU  EMERGENCY DEPARTMENT ENCOUNTER      Pt Name: Consuelo Holley  MRN: [id number]  [place] 8/18/1933  Date of evaluation: 12/30/2020  Provider: Cici Aguirre DO    CHIEF COMPLAINT       Chief Complaint   Patient presents with    Shortness of Breath     pt states gradually gotten worse over the past few months         HISTORY OF PRESENT ILLNESS   (Location/Symptom, Timing/Onset,Context/Setting, Quality, Duration, Modifying Factors, Severity)  Note limiting factors. Consuelo Holley is a 80 y.o. male who presents to the emergency department the evaluation of shortness of breath. Patient states that this started a few days ago. Patient does have a history of pulmonary interstitial fibrosis as well as COPD. Patient was admitted to [facility] about 13 days ago for pneumonia on top of his chronic lung diseases. Patient was discharged on antibiotic back to [facility] and was doing okay until the last few days after his antibiotic ended and he states he is been feeling short of breath again. He had some chills but no fever. He has a chronic cough, it is productive of white thick sputum. He has a good appetite. He has no vomiting or diarrhea. Patient denies chest pain. Does wear chronic oxygen, he states normally it was 3.5 L, however, recently it has been 4.5 L which is what they leveled him off at and he states they have been telling him they would like to go up on that number    Nursing Notes were reviewed. REVIEW OF SYSTEMS    (2-9systems for level 4, 10 or more for level 5)     Review of Systems   Constitutional: Positive for chills. Negative for fever. HENT: Negative for sore throat. Respiratory: Positive for cough and shortness of breath. Cardiovascular: Negative for chest pain. Gastrointestinal: Negative for diarrhea and vomiting. Genitourinary: Negative for dysuria. Skin: Negative for rash. Neurological: Negative for weakness. All other systems reviewed and are negative. Except asnoted above the remainder of the review of systems was reviewed and negative. PAST MEDICAL HISTORY     Past Medical History:   Diagnosis Date    Asthma     COPD (chronic obstructive pulmonary disease) ([address]     Pulmonary fibrosis ([address]          SURGICAL HISTORY       Past Surgical History:   Procedure Laterality Date    PRE-MALIGNANT / BENIGN SKIN LESION EXCISION  09/12/2017    exc bx lesion right side of nose and right neck         CURRENT MEDICATIONS     Current Discharge Medication List      CONTINUE these medications which have NOT CHANGED    Details   tamsulosin (FLOMAX) 0.4 MG capsule Take 1 capsule by mouth daily  Qty: 90 capsule, Refills: 3      guaiFENesin (ROBITUSSIN) 100 MG/5ML syrup Take 200 mg by mouth 3 times daily as needed for Cough      famotidine (PEPCID) 40 MG tablet Take 1 tablet by mouth daily      Diphenhydramine-APAP, sleep, (TYLENOL PM EXTRA STRENGTH PO) Take 1 tablet by mouth daily      triamcinolone (NASACORT ALLERGY 24HR) 55 MCG/ACT nasal inhaler 2 sprays by Nasal route daily      ESBRIET 267 MG CAPS Take 9 capsules by mouth daily Pt takes 3 with each meal      budesonide (PULMICORT) 0.5 MG/2ML nebulizer suspension 1 treatment twice daily  Refills: 0      BROVANA 15 MCG/2ML NEBU 1 treatment twice a day  Refills: 0      albuterol (PROVENTIL HFA;VENTOLIN HFA) 108 (90 BASE) MCG/ACT inhaler Inhale 2 puffs into the lungs every 6 hours as needed for Wheezing. Qty: 1 Inhaler, Refills: 3      aspirin 81 MG tablet Take 81 mg by mouth daily. multivitamin (THERAGRAN) per tablet Take 1 tablet by mouth daily.                ALLERGIES     Bactrim, Other, and Prednisone    FAMILY HISTORY       Family History   Problem Relation Age of Onset    Heart Attack Mother     Heart Attack Father           SOCIAL HISTORY       Social History     Socioeconomic History    Marital status:  Spouse name: Not on file    Number of children: Not on file    Years of education: Not on file    Highest education level: Not on file   Occupational History    Not on file   Social Needs    Financial resource strain: Not hard at all    Food insecurity     Worry: Never true     Inability: Never true    Transportation needs     Medical: No     Non-medical: No   Tobacco Use    Smoking status: Former Smoker     Quit date: 1967     Years since quittin.0    Smokeless tobacco: Never Used   Substance and Sexual Activity    Alcohol use: No    Drug use: No    Sexual activity: Not on file   Lifestyle    Physical activity     Days per week: Not on file     Minutes per session: Not on file    Stress: Not on file   Relationships    Social connections     Talks on phone: Not on file     Gets together: Not on file     Attends Worship service: Not on file     Active member of club or organization: Not on file     Attends meetings of clubs or organizations: Not on file     Relationship status: Not on file    Intimate partner violence     Fear of current or ex partner: Not on file     Emotionally abused: Not on file     Physically abused: Not on file     Forced sexual activity: Not on file   Other Topics Concern    Not on file   Social History Narrative    Not on file       SCREENINGS    [place] Coma Scale  Eye Opening: Spontaneous  Best Verbal Response: Oriented  Best Motor Response: Obeys commands  [place] Coma Scale Score: 15        PHYSICAL EXAM    (up to 7 for level 4, 8 or more for level 5)     ED Triage Vitals   BP Temp Temp Source Pulse Resp SpO2 Height Weight   20 1049 20 1054 20 1054 20 1049 20 1049 -- 20 1049 20 1049   (!) 111/52 98.5 °F (36.9 °C) Temporal 105 30  5' 6\" (1.676 m) 154 lb (69.9 kg)       Physical Exam  Vitals signs and nursing note reviewed. Constitutional:       General: He is not in acute distress. Appearance: Normal appearance. He is not ill-appearing or toxic-appearing. HENT:      Head: Normocephalic and atraumatic. Nose: Nose normal. No congestion. Mouth/Throat:      Mouth: Mucous membranes are moist.   Eyes:      General:         Right eye: No discharge. Left eye: No discharge. Conjunctiva/sclera: Conjunctivae normal.   Neck:      Musculoskeletal: Normal range of motion. Cardiovascular:      Rate and Rhythm: Normal rate and regular rhythm. Pulses: Normal pulses. Heart sounds: Normal heart sounds. No murmur. Pulmonary:      Effort: Pulmonary effort is normal. Tachypnea present. No accessory muscle usage or respiratory distress. Breath sounds: Normal breath sounds. No decreased breath sounds, wheezing or rales. Abdominal:      General: Abdomen is flat. There is no distension. Palpations: Abdomen is soft. Tenderness: There is no abdominal tenderness. Musculoskeletal: Normal range of motion. General: No deformity or signs of injury. Comments: 1+ nonpitting edema bilaterally in the lower extremities   Skin:     General: Skin is warm and dry. Capillary Refill: Capillary refill takes less than 2 seconds. Findings: No rash. Neurological:      General: No focal deficit present. Mental Status: He is alert and oriented to person, place, and time. Mental status is at baseline. Motor: No weakness. Comments: Speaking normally. No facial asymmetry. Moving all 4 extremities.  no gait testing   Psychiatric:         Mood and Affect: Mood normal.         EMERGENCY DEPARTMENT COURSE and DIFFERENTIAL DIAGNOSIS/MDM:   Vitals:    Vitals:    01/01/21 0603 01/01/21 0604 01/01/21 0642 01/01/21 0900   BP:  100/62     Pulse: 59 59     Resp: 24 20 16    Temp:    97.3 °F (36.3 °C)   TempSrc:    Oral   SpO2: 94% 95% 96%    Weight:       Height: Patient presents to the emergency department with a complaint described above. Vital signs showed tachypnea, pulse ox was 95% on nonrebreather. Looking back through the records, patient has had to be on nonrebreather is quite frequently, he is not in any distress at this time. He is tachypneic but he is otherwise resting comfortably. He speaks in 3-4 word sentences which is his baseline. He feels a little bit more dyspnea on exertion than usual.  He is not coughing. I reviewed the medical record at [facility] and he was treated for a multifocal pneumonia, he had negative Covid test and they did a respiratory panel on December 18 in which his Covid, influenza and all other respiratory viruses was negative. I do think there is some component of progression of his disease, I do also think he had some loss of lung function and capacity because he states he became much less active with the pandemic started earlier this year. At this time I am getting an EKG, chest x-ray and routine blood work that includes cardiac enzymes. I am also going to get a Covid test and an influenza test and I will reevaluate. DIAGNOSTIC RESULTS     Twelve lead EKG interpreted by myself:  A 12 lead EKG done at 1050, interpreted by myself, showed a regular rhythm at a rate of 101bpm.  The [place] interval was normal.  The QRS complex was normal.  There was no ST segment elevation or depression, T wave inversion not present but there is some nonspecific flattening noted.   QRS progression through precordial leads was grossly normal.  Interpretation: Normal sinus rhythm, no ST segment changes and no pattern consistent with acute ischemia or infarct    LABS:  Labs Reviewed   CBC WITH AUTO DIFFERENTIAL - Abnormal; Notable for the following components:       Result Value    WBC 12.9 (*)     RBC 4.01 (*)     Hemoglobin 12.0 (*)     Hematocrit 36.6 (*)     RDW 15.9 (*)     Platelets 529 (*)     Seg Neutrophils 69 (*)     Lymphocytes 19 (*) Monocytes 12 (*)     Eosinophils % 0 (*)     Segs Absolute 8.90 (*)     Absolute Mono # 1.55 (*)     All other components within normal limits   COMPREHENSIVE METABOLIC PANEL - Abnormal; Notable for the following components:    Glucose 156 (*)     CREATININE 0.40 (*)     Sodium 134 (*)     Chloride 89 (*)     CO2 35 (*)     All other components within normal limits   LACTIC ACID - Abnormal; Notable for the following components:    Lactic Acid 3.2 (*)     All other components within normal limits   TROPONIN - Abnormal; Notable for the following components:    Troponin, High Sensitivity 26 (*)     All other components within normal limits   BRAIN NATRIURETIC PEPTIDE - Abnormal; Notable for the following components:    Pro- (*)     All other components within normal limits   COVID-19 - Abnormal; Notable for the following components:    SARS-CoV-2, Rapid DETECTED (*)     All other components within normal limits   BASIC METABOLIC PANEL W/ REFLEX TO MG FOR LOW K - Abnormal; Notable for the following components:    Glucose 103 (*)     CREATININE <0.40 (*)     Calcium 8.4 (*)     Sodium 134 (*)     Chloride 94 (*)     CO2 36 (*)     Anion Gap 4 (*)     All other components within normal limits   CBC - Abnormal; Notable for the following components:    RBC 3.60 (*)     Hemoglobin 10.8 (*)     Hematocrit 33.7 (*)     RDW 14.9 (*)     Platelets 802 (*)     All other components within normal limits   PROTIME-INR - Abnormal; Notable for the following components:    Protime 15.1 (*)     All other components within normal limits   C-REACTIVE PROTEIN - Abnormal; Notable for the following components:    CRP 71.9 (*)     All other components within normal limits   LACTIC ACID - Abnormal; Notable for the following components:    Lactic Acid 3.2 (*)     All other components within normal limits   HEPATIC FUNCTION PANEL - Abnormal; Notable for the following components:    Alb 3.2 (*)     Total Protein 5.7 (*) All other components within normal limits   PROCALCITONIN - Abnormal; Notable for the following components:    Procalcitonin 0.16 (*)     All other components within normal limits   TROPONIN - Abnormal; Notable for the following components:    Troponin, High Sensitivity 34 (*)     All other components within normal limits   PROCALCITONIN - Abnormal; Notable for the following components:    Procalcitonin 0.18 (*)     All other components within normal limits   CBC - Abnormal; Notable for the following components:    RBC 3.61 (*)     Hemoglobin 10.9 (*)     Hematocrit 33.9 (*)     RDW 14.9 (*)     Platelets 222 (*)     All other components within normal limits   COMPREHENSIVE METABOLIC PANEL W/ REFLEX TO MG FOR LOW K - Abnormal; Notable for the following components:    Glucose 131 (*)     CREATININE <0.40 (*)     Calcium 8.4 (*)     Sodium 133 (*)     Chloride 92 (*)     CO2 37 (*)     Anion Gap 4 (*)     Total Bilirubin 0.26 (*)     Total Protein 5.6 (*)     Alb 2.8 (*)     All other components within normal limits   C-REACTIVE PROTEIN - Abnormal; Notable for the following components:    .9 (*)     All other components within normal limits   RAPID INFLUENZA A/B ANTIGENS   CULTURE, BLOOD 1   CULTURE, BLOOD 1   MAGNESIUM   D-DIMER, QUANTITATIVE   FIBRINOGEN   D-DIMER, QUANTITATIVE   PREPARE COVID-19 CONVALESCENT PLASMA       All other labs were within normal range or not returned as of this dictation. RADIOLOGY:  XR CHEST PORTABLE   Final Result   No significant interval change. Diffuse bilateral patchy airspace opacity   and interstitial thickening. XR CHEST PORTABLE   Final Result   Patchy diffuse bilateral pulmonary opacification most consistent with   multifocal pneumonia including atypical viral pneumonia.                ED Course as of Keith 01 1123   Wed Dec 30, 2020

## 2020-12-30 NOTE — ED NOTES
Talked to [address] at [facility] AT [place] and notified her of pt covid + results and that he will be admitted to [address] [place] [address]  12/30/20 1212

## 2020-12-30 NOTE — ED NOTES
Abimael Short to arrange transportation to [facility] [id number]     Dona Don RN  12/30/20 3439

## 2020-12-30 NOTE — ED NOTES
Report given to [facility] RN at [facility] AT [facility]     [facility]  12/30/20 4645

## 2020-12-30 NOTE — ED NOTES
Silvia Hurtado on the phone for Dr Denny Pelaez via [facility]  12/30/20 4971

## 2020-12-30 NOTE — PROGRESS NOTES
Patient currently low 80's in SpO2 while on 10L via non-rebreather mask, but patient is talking. Respiratory therapy at bedisde and talking to patient about possible Bipap machine. Patient is agreeable.

## 2020-12-30 NOTE — Clinical Note
Patient Class: Inpatient [101]   REQUIRED: Diagnosis: Pneumonia due to COVID-19 virus [[id number]]   Estimated Length of Stay: Estimated stay of more than 2 midnights   Admitting Provider: Carmen Rush [[id number]]   Telemetry Bed Required?: Yes

## 2020-12-31 LAB
ANION GAP SERPL CALCULATED.3IONS-SCNC: 4 MMOL/L (ref 9–17)
BUN BLDV-MCNC: 9 MG/DL (ref 8–23)
BUN/CREAT BLD: ABNORMAL (ref 9–20)
CALCIUM SERPL-MCNC: 8.4 MG/DL (ref 8.6–10.4)
CHLORIDE BLD-SCNC: 94 MMOL/L (ref 98–107)
CO2: 36 MMOL/L (ref 20–31)
CREAT SERPL-MCNC: <0.4 MG/DL (ref 0.7–1.2)
EKG ATRIAL RATE: 101 BPM
EKG P AXIS: 33 DEGREES
EKG P-R INTERVAL: 180 MS
EKG Q-T INTERVAL: 278 MS
EKG QRS DURATION: 80 MS
EKG QTC CALCULATION (BAZETT): 360 MS
EKG R AXIS: 48 DEGREES
EKG T AXIS: 19 DEGREES
EKG VENTRICULAR RATE: 101 BPM
GFR AFRICAN AMERICAN: ABNORMAL ML/MIN
GFR NON-AFRICAN AMERICAN: ABNORMAL ML/MIN
GFR SERPL CREATININE-BSD FRML MDRD: ABNORMAL ML/MIN/{1.73_M2}
GFR SERPL CREATININE-BSD FRML MDRD: ABNORMAL ML/MIN/{1.73_M2}
GLUCOSE BLD-MCNC: 103 MG/DL (ref 70–99)
HCT VFR BLD CALC: 33.7 % (ref 40.7–50.3)
HEMOGLOBIN: 10.8 G/DL (ref 13–17)
INR BLD: 1.2
MCH RBC QN AUTO: 30 PG (ref 25.2–33.5)
MCHC RBC AUTO-ENTMCNC: 32 G/DL (ref 28.4–34.8)
MCV RBC AUTO: 93.6 FL (ref 82.6–102.9)
NRBC AUTOMATED: 0 PER 100 WBC
PDW BLD-RTO: 14.9 % (ref 11.8–14.4)
PLATELET # BLD: 102 K/UL (ref 138–453)
PMV BLD AUTO: 11.4 FL (ref 8.1–13.5)
POTASSIUM SERPL-SCNC: 3.7 MMOL/L (ref 3.7–5.3)
PROCALCITONIN: 0.18 NG/ML
PROTHROMBIN TIME: 15.1 SEC (ref 11.5–14.2)
RBC # BLD: 3.6 M/UL (ref 4.21–5.77)
SODIUM BLD-SCNC: 134 MMOL/L (ref 135–144)
WBC # BLD: 8.9 K/UL (ref 3.5–11.3)

## 2020-12-31 PROCEDURE — 99233 SBSQ HOSP IP/OBS HIGH 50: CPT | Performed by: INTERNAL MEDICINE

## 2020-12-31 PROCEDURE — 85027 COMPLETE CBC AUTOMATED: CPT

## 2020-12-31 PROCEDURE — 85610 PROTHROMBIN TIME: CPT

## 2020-12-31 PROCEDURE — 2580000003 HC RX 258: Performed by: INTERNAL MEDICINE

## 2020-12-31 PROCEDURE — 2500000003 HC RX 250 WO HCPCS: Performed by: INTERNAL MEDICINE

## 2020-12-31 PROCEDURE — 36415 COLL VENOUS BLD VENIPUNCTURE: CPT

## 2020-12-31 PROCEDURE — 6370000000 HC RX 637 (ALT 250 FOR IP): Performed by: NURSE PRACTITIONER

## 2020-12-31 PROCEDURE — 84145 PROCALCITONIN (PCT): CPT

## 2020-12-31 PROCEDURE — 6360000002 HC RX W HCPCS: Performed by: NURSE PRACTITIONER

## 2020-12-31 PROCEDURE — 94660 CPAP INITIATION&MGMT: CPT

## 2020-12-31 PROCEDURE — 94761 N-INVAS EAR/PLS OXIMETRY MLT: CPT

## 2020-12-31 PROCEDURE — 2700000000 HC OXYGEN THERAPY PER DAY

## 2020-12-31 PROCEDURE — 6370000000 HC RX 637 (ALT 250 FOR IP): Performed by: INTERNAL MEDICINE

## 2020-12-31 PROCEDURE — 80048 BASIC METABOLIC PNL TOTAL CA: CPT

## 2020-12-31 PROCEDURE — 2000000000 HC ICU R&B

## 2020-12-31 PROCEDURE — 94640 AIRWAY INHALATION TREATMENT: CPT

## 2020-12-31 PROCEDURE — 2580000003 HC RX 258: Performed by: NURSE PRACTITIONER

## 2020-12-31 PROCEDURE — 6360000002 HC RX W HCPCS: Performed by: INTERNAL MEDICINE

## 2020-12-31 RX ORDER — FUROSEMIDE 10 MG/ML
40 INJECTION INTRAMUSCULAR; INTRAVENOUS ONCE
Status: COMPLETED | OUTPATIENT
Start: 2020-12-31 | End: 2020-12-31

## 2020-12-31 RX ORDER — DEXMEDETOMIDINE HYDROCHLORIDE 4 UG/ML
0.2 INJECTION, SOLUTION INTRAVENOUS CONTINUOUS
Status: DISCONTINUED | OUTPATIENT
Start: 2020-12-31 | End: 2021-01-01 | Stop reason: SDUPTHER

## 2020-12-31 RX ORDER — CALCIUM CARBONATE 200(500)MG
500 TABLET,CHEWABLE ORAL 3 TIMES DAILY PRN
Status: DISCONTINUED | OUTPATIENT
Start: 2020-12-31 | End: 2021-01-14 | Stop reason: HOSPADM

## 2020-12-31 RX ORDER — ACETYLCYSTEINE 200 MG/ML
600 SOLUTION ORAL; RESPIRATORY (INHALATION) 3 TIMES DAILY
Status: DISCONTINUED | OUTPATIENT
Start: 2020-12-31 | End: 2021-01-05

## 2020-12-31 RX ORDER — IPRATROPIUM BROMIDE AND ALBUTEROL SULFATE 2.5; .5 MG/3ML; MG/3ML
1 SOLUTION RESPIRATORY (INHALATION)
Status: DISCONTINUED | OUTPATIENT
Start: 2020-12-31 | End: 2021-01-14 | Stop reason: HOSPADM

## 2020-12-31 RX ADMIN — ENOXAPARIN SODIUM 30 MG: 30 INJECTION SUBCUTANEOUS at 08:13

## 2020-12-31 RX ADMIN — FUROSEMIDE 40 MG: 10 INJECTION, SOLUTION INTRAMUSCULAR; INTRAVENOUS at 14:27

## 2020-12-31 RX ADMIN — DEXMEDETOMIDINE HYDROCHLORIDE 0.2 MCG/KG/HR: 4 INJECTION, SOLUTION INTRAVENOUS at 13:45

## 2020-12-31 RX ADMIN — ARFORMOTEROL TARTRATE 15 MCG: 15 SOLUTION RESPIRATORY (INHALATION) at 08:45

## 2020-12-31 RX ADMIN — BUDESONIDE 1000 MCG: 0.5 SUSPENSION RESPIRATORY (INHALATION) at 08:45

## 2020-12-31 RX ADMIN — IPRATROPIUM BROMIDE AND ALBUTEROL SULFATE 1 AMPULE: .5; 3 SOLUTION RESPIRATORY (INHALATION) at 11:46

## 2020-12-31 RX ADMIN — TAMSULOSIN HYDROCHLORIDE 0.4 MG: 0.4 CAPSULE ORAL at 21:05

## 2020-12-31 RX ADMIN — FAMOTIDINE 40 MG: 20 TABLET, FILM COATED ORAL at 21:04

## 2020-12-31 RX ADMIN — SODIUM CHLORIDE, PRESERVATIVE FREE 10 ML: 5 INJECTION INTRAVENOUS at 09:00

## 2020-12-31 RX ADMIN — ARFORMOTEROL TARTRATE 15 MCG: 15 SOLUTION RESPIRATORY (INHALATION) at 21:02

## 2020-12-31 RX ADMIN — SODIUM CHLORIDE, PRESERVATIVE FREE 10 ML: 5 INJECTION INTRAVENOUS at 21:09

## 2020-12-31 RX ADMIN — ASPIRIN 81 MG: 81 TABLET, COATED ORAL at 08:14

## 2020-12-31 RX ADMIN — BUDESONIDE 1000 MCG: 0.5 SUSPENSION RESPIRATORY (INHALATION) at 20:55

## 2020-12-31 RX ADMIN — REMDESIVIR 100 MG: 100 INJECTION, POWDER, LYOPHILIZED, FOR SOLUTION INTRAVENOUS at 21:23

## 2020-12-31 RX ADMIN — Medication 6000 UNITS: at 08:13

## 2020-12-31 RX ADMIN — Medication 50 MG: at 09:57

## 2020-12-31 RX ADMIN — IPRATROPIUM BROMIDE AND ALBUTEROL SULFATE 1 AMPULE: .5; 3 SOLUTION RESPIRATORY (INHALATION) at 20:54

## 2020-12-31 RX ADMIN — ANTACID TABLETS 500 MG: 500 TABLET, CHEWABLE ORAL at 09:57

## 2020-12-31 RX ADMIN — DEXAMETHASONE 6 MG: 4 TABLET ORAL at 09:57

## 2020-12-31 RX ADMIN — IPRATROPIUM BROMIDE AND ALBUTEROL SULFATE 1 AMPULE: .5; 3 SOLUTION RESPIRATORY (INHALATION) at 15:07

## 2020-12-31 RX ADMIN — GUAIFENESIN AND DEXTROMETHORPHAN 5 ML: 100; 10 SYRUP ORAL at 09:57

## 2020-12-31 ASSESSMENT — PAIN SCALES - GENERAL: PAINLEVEL_OUTOF10: 0

## 2020-12-31 NOTE — FLOWSHEET NOTE
Patient in COVID-19 isolation; sleeping on BIPAP; no family present. Writer prays for patient from [facility]. Spiritual Care will follow as needed.      12/31/20 1398   Encounter Summary   Services provided to: Patient not available   Referral/Consult From: Diane   Continue Visiting   (12/31/20 sleeping/BIPAP)   Complexity of Encounter Low   Length of Encounter 15 minutes   Routine   Type Initial   Assessment Unable to respond   Intervention Prayer

## 2020-12-31 NOTE — PROGRESS NOTES
Physical Therapy  DATE: 2020    NAME: Ivanna Abarca  MRN: [id number]   : 1933    Patient not seen this date for Physical Therapy due to:  [] Blood transfusion in progress  [] Cancel by RN  [] Hemodialysis  []  Refusal by Patient   [] Spine Precautions   [] Strict Bedrest  [] Surgery  [] Testing      [x] Other - RN asked PT/OT to get pt into chair. Upon entering, Dr. Arturo Murray stated pt is very anxious and it would be better to hold until tomorrow. Will continue to follow. [] PT being discontinued at this time. Patient independent. No further needs. [] PT being discontinued at this time as the patient has been transferred to hospice care. No further needs.     Linda Medrano, PT

## 2020-12-31 NOTE — PROGRESS NOTES
Pt arrives from [facility] on [facility]. Pt trialed on HFNC and his sat did incr to 92% but patient appears SOB so he is placed back on BiPAP at this time.

## 2020-12-31 NOTE — CONSULTS
Pulmonary Medicine and Critical Care Consult    Patient - Iza Smithbinder   MRN -  [id number]   Acct # - [de-identified]   - 1933      Date of Admission -  2020 10:47 AM  Date of evaluation -  2020  Room - [address] [place] [address]   Randal Chisholm MD Primary Care Physician - Melva Hercules MD     Reason for Consult       COVID-19 pneumonia  Assessment   · Acute  hypoxic respiratory failure  ·   COVID-19  Pneumonia  ·  mild pulmonary edema  ·  history of interstitial lung fibrosis  ·  ex-smoker possible COPD  ·  mild thrombocytopenia    Recommendations   ·  oxygen with BiPAP support  ·  high-flow oxygen as tolerated  ·  start Precedex drip  ·  NPO  ·  prone as tolerated  ·  airborne isolation  ·  DuoNeb by nebulizer q.4 hours  ·  Pulmicort 0.5 q.12 hours  ·  Brovana q.12 hours  ·  Mucomyst by nebulizer 3 times daily  ·  Decadron 6 mg IV daily  ·  remdesivir by ID  ·  Lasix 40 IV x1/ insert Hunt catheter/monitor urine output  ·  monitor D-dimer and procalcitonin  And chest x-ray  · echocardiogramn  ·  discussed with RN RT  ·  DVT prophylaxis with Lovenox /monitor platelets    Problem List      Patient Active Problem List   Diagnosis    COPD (chronic obstructive pulmonary disease) ([address]    Dyslipidemia    Pulmonary interstitial fibrosis ([address]    Neoplasm of uncertain behavior of skin    Pneumonia due to COVID-19 virus    Gastroesophageal reflux disease without esophagitis    Former smoker    Benign prostatic hyperplasia    Anxiety    Acute-on-chronic respiratory failure ([address]    COVID-19    SIRS (systemic inflammatory response syndrome) ([address]       HPI Consuelo Holley is 80 y.o.,  male,  Previous medical history of interstitial lung fibrosis, gastroesophageal reflux disease, benign prostatic hypertrophy, dyslipidemia. He presented to the hospital for worsening shortness of breath associated with mostly dry cough over  3 days duration. He had associated generalized weakness and fatigue. He had significant hypoxia on presenting to the emergency room required oxygen at 15 L by non-rebreather mask. He tested positive for COVID was admitted to the isolation. He had desaturation and I was contacted advised to transferred to ICU initiated BiPAP support for respiratory failure. He has been  on BiPAP at 50% O2. He has increased respiratory rate. He denies chest pain. He had remote history of smoking  BiPAP in quit over 20 years ago.   He has been a a     PMHx   Past Medical History      Diagnosis Date    Asthma     [facility]     Pulmonary fibrosis ([facility]       Past Surgical History        Procedure Laterality Date    PRE-MALIGNANT / BENIGN SKIN LESION EXCISION  09/12/2017    exc bx lesion right side of nose and right neck       Meds    Current Medications    ipratropium-albuterol  1 ampule Inhalation Q4H WA    aspirin  81 mg Oral Daily    Pirfenidone  3 capsule Oral TID WC    famotidine  40 mg Oral Daily    fluticasone  2 spray Each Nostril Daily    tamsulosin  0.4 mg Oral Daily    Arformoterol Tartrate  15 mcg Nebulization BID    budesonide  1 mg Nebulization BID    sodium chloride flush  10 mL Intravenous 2 times per day    enoxaparin  30 mg Subcutaneous BID    dexamethasone  6 mg Oral Daily    Vitamin D  6,000 Units Oral Daily    zinc sulfate  50 mg Oral Daily    remdesivir IVPB  100 mg Intravenous Q24H calcium carbonate, albuterol, sodium chloride flush, potassium chloride **OR** potassium alternative oral replacement **OR** potassium chloride, magnesium sulfate, promethazine **OR** ondansetron, polyethylene glycol, nicotine, acetaminophen **OR** acetaminophen, sodium chloride, guaiFENesin-dextromethorphan, ALPRAZolam, sodium chloride  IV Drips/Infusions   dexmedetomidine HCl in NaCl      sodium chloride       Home Medications  Medications Prior to Admission: tamsulosin (FLOMAX) 0.4 MG capsule, Take 1 capsule by mouth daily  guaiFENesin (ROBITUSSIN) 100 MG/5ML syrup, Take 200 mg by mouth 3 times daily as needed for Cough  famotidine (PEPCID) 40 MG tablet, Take 1 tablet by mouth daily  Diphenhydramine-APAP, sleep, (TYLENOL PM EXTRA STRENGTH PO), Take 1 tablet by mouth daily  triamcinolone (NASACORT ALLERGY 24HR) 55 MCG/ACT nasal inhaler, 2 sprays by Nasal route daily  ESBRIET 267 MG CAPS, Take 9 capsules by mouth daily Pt takes 3 with each meal  budesonide (PULMICORT) 0.5 MG/2ML nebulizer suspension, 1 treatment twice daily  BROVANA 15 MCG/2ML NEBU, 1 treatment twice a day  albuterol (PROVENTIL HFA;VENTOLIN HFA) 108 (90 BASE) MCG/ACT inhaler, Inhale 2 puffs into the lungs every 6 hours as needed for Wheezing. aspirin 81 MG tablet, Take 81 mg by mouth daily. multivitamin (THERAGRAN) per tablet, Take 1 tablet by mouth daily.       Allergies    Bactrim, Other, and Prednisone  Social History     Social History     Socioeconomic History    Marital status:      Spouse name: Not on file    Number of children: Not on file    Years of education: Not on file    Highest education level: Not on file   Occupational History    Not on file   Social Needs    Financial resource strain: Not hard at all   [employer] insecurity     Worry: Never true     Inability: Never true   [facility] [address] [employer] needs     Medical: No     Non-medical: No   Tobacco Use    Smoking status: Former Smoker     Quit date: 12/17/1967 Years since quittin.0    Smokeless tobacco: Never Used   Substance and Sexual Activity    Alcohol use: No    Drug use: No    Sexual activity: Not on file   Lifestyle    Physical activity     Days per week: Not on file     Minutes per session: Not on file    Stress: Not on file   Relationships    Social connections     Talks on phone: Not on file     Gets together: Not on file     Attends Hoah[employer]ism service: Not on file     Active member of club or organization: Not on file     Attends meetings of clubs or organizations: Not on file     Relationship status: Not on file    Intimate partner violence     Fear of current or ex partner: Not on file     Emotionally abused: Not on file     Physically abused: Not on file     Forced sexual activity: Not on file   Other Topics Concern    Not on file   Social History Narrative    Not on file     Family History          Problem Relation Age of Onset    Heart Attack Mother     Heart Attack Father      ROS - 11 systems    attempted not possible as above on BiPAP  Vitals     height is 5' 6\" (1.676 m) and weight is 154 lb (69.9 kg). His axillary temperature is 97 °F (36.1 °C). His blood pressure is 106/57 (abnormal) and his pulse is 67. His respiration is 18 and oxygen saturation is 96%. Body mass index is 24.86 kg/m². I/O        Intake/Output Summary (Last 24 hours) at 2020 1404  Last data filed at 2020 0501  Gross per 24 hour   Intake    Output 1300 ml   Net -1300 ml     I/O last 3 completed shifts:  In: -   Out: 1300 [Urine:1300]   Patient Vitals for the past 96 hrs (Last 3 readings):   Weight   20 1049 154 lb (69.9 kg)     Exam   General Appearance  Awake, alert, oriented,  Anxious hyperventilating  HEENT - Head is normocephalic, atraumatic.  Pupil reactive to light  Neck - Supple, symmetrical, trachea midline and Soft, trachea midline and straight  Lungs -  Decreased breath sounds no crackles or wheezing Cardiovascular - Heart sounds are normal.  Regular rhythm normal rate without murmur, gallop or rub. Abdomen - Soft, nontender, nondistended, no masses or organomegaly  Neurologic - CN II-XII are grossly intact.  There are no focal motor  deficits  Skin - No bruising or bleeding  Extremities - No cyanosis, clubbing 1+ edema    Labs  - Old records and notes have been reviewed in [facility]   CBC     Lab Results   Component Value Date    WBC 8.9 12/31/2020    RBC 3.60 12/31/2020    RBC 4.79 12/13/2011    HGB 10.8 12/31/2020    HCT 33.7 12/31/2020     12/31/2020     12/13/2011    MCV 93.6 12/31/2020    MCH 30.0 12/31/2020    MCHC 32.0 12/31/2020    RDW 14.9 12/31/2020    LYMPHOPCT 19 12/30/2020    LYMPHOPCT 25.9 01/10/2017    MONOPCT 12 12/30/2020    MONOPCT 16.6 01/10/2017    EOSPCT 0.4 01/10/2017    BASOPCT 0 12/30/2020    BASOPCT 0.3 01/10/2017    MONOSABS 1.55 12/30/2020    MONOSABS 0.8 01/10/2017    LYMPHSABS 2.45 12/30/2020    LYMPHSABS 1.2 01/10/2017    EOSABS 0.00 12/30/2020    EOSABS 0.0 01/10/2017    BASOSABS 0.00 12/30/2020    DIFFTYPE NOT REPORTED 12/30/2020     BMP   Lab Results   Component Value Date     12/31/2020    K 3.7 12/31/2020    CL 94 12/31/2020    CO2 36 12/31/2020    BUN 9 12/31/2020    CREATININE <0.40 12/31/2020    GLUCOSE 103 12/31/2020    GLUCOSE 107 12/13/2011    CALCIUM 8.4 12/31/2020    MG 2.0 12/30/2020     LFTS  Lab Results   Component Value Date    ALKPHOS 46 12/30/2020    ALT 17 12/30/2020    AST 16 12/30/2020    PROT 5.7 12/30/2020    BILITOT 0.31 12/30/2020    BILIDIR 0.12 12/30/2020    IBILI 0.19 12/30/2020    LABALBU 3.2 12/30/2020    LABALBU 4.2 12/13/2011     INR   Lab Results   Component Value Date    INR 1.2 12/31/2020    PROTIME 15.1 (H) 12/31/2020       Radiology    CXR 12/30  Patchy diffuse bilateral pulmonary opacification most consistent with   multifocal pneumonia including atypical viral pneumonia         CT Scans  (See actual reports for details) \"Thank you for asking us to see this patient\"    Case discussed with nurse and patient/family. Questions and concerns addressed.     Electronically signed by     Sharmila Coombs MD on 12/31/2020 at 2:04 PM   cc35 min

## 2020-12-31 NOTE — PLAN OF CARE
Problem: Airway Clearance - Ineffective  Goal: Achieve or maintain patent airway  Outcome: Ongoing     Problem: Gas Exchange - Impaired  Goal: Absence of hypoxia  Outcome: Ongoing     Problem: Breathing Pattern - Ineffective  Goal: Ability to achieve and maintain a regular respiratory rate  Outcome: Ongoing     Problem: Isolation Precautions - Risk of Spread of Infection  Goal: Prevent transmission of infection  Outcome: Ongoing     Problem: Nutrition Deficits  Goal: Optimize nutrtional status  Outcome: Ongoing     Problem: Fatigue  Goal: Verbalize increase energy and improved vitality  Outcome: Ongoing     Problem: Skin Integrity:  Goal: Will show no infection signs and symptoms  Description: Will show no infection signs and symptoms  Outcome: Ongoing     Problem: Falls - Risk of:  Goal: Will remain free from falls  Description: Will remain free from falls. Fall risk assessment completed. Patient instructed to use call light. Bed locked and in lowest position, side rails up 2/4, call light and bedside table within reach, clutter removed, and non-skid footwear on when pt out of bed. Hourly rounds will continue.  Bed alarm on at this time  Outcome: Ongoing

## 2020-12-31 NOTE — PROGRESS NOTES
[facility]  Office: [address], DO, Remigio Holloway, DO, Mike Farooqo, DO, Evelia Keys Blood, DO, Orlin Morillo MD, Guerline Ferrari MD, Tomi Fuller MD, Anahy Delvalle MD, Christiano Thompson MD, Sage Hooper MD, Preethi Vaca MD, Kati Hicks MD, Anuel Pritchett MD, Dagmar Hernandez, DO, Zuleyma Figueroa MD, Nghia Jacobo MD, Aide Lu, DO, Noreen Gutierrez MD,  Patric Nelson, DO, Chantale Cortes MD, Fartun Quiroz MD, Fan Stokes, [facility], [address], [facility], Jeovanny Owens, CNP, Yessy Guillaume, CNS, Albaro Harris, [facility], Raynell Councilman, CNP, Dev Hawthorne, CNP, Bobo Emery, CNP, Catherine Smith, CNP, Leah Woo PA-C, Hoarcio Live, [facility], Lisandra Heller, CNP, Hollie Browning, CNP, Avila Mehta, CNP, Kade Caraballo, CNP, [facility], [facility]    Progress Note    12/31/2020    10:27 AM    Name:   Elpidio Hu  MRN:     [id number]     Acct:      [de-identified]   Room:   [address] [place] [address] Day:  1  Admit Date:  12/30/2020 10:47 AM    PCP:   Inocencio Hope MD  Code Status:  DNR-CCA    Subjective:     C/C:   Chief Complaint   Patient presents with    Shortness of Breath     pt states gradually gotten worse over the past few months   ***  Interval History Status: {IMPROVED/NOCHANGE/WORSE:77002}. ***    Brief History:     ***    Review of Systems:     Review of Systems   Constitutional: Positive for activity change and fatigue. Negative for fever. HENT: Negative for sinus pressure and sinus pain. Eyes: Negative for photophobia and visual disturbance. Respiratory: Positive for cough and shortness of breath. Cardiovascular: Negative for chest pain, palpitations and leg swelling. Gastrointestinal: Negative for nausea and vomiting. Endocrine: Negative for cold intolerance and heat intolerance. Genitourinary: Negative for frequency and urgency.         Retention Musculoskeletal: Negative for arthralgias and myalgias. Skin: Negative for color change, pallor and rash. Neurological: Positive for weakness. Negative for syncope. Hematological: Negative for adenopathy. Does not bruise/bleed easily. Psychiatric/Behavioral: Negative for agitation and confusion. The patient is not nervous/anxious. Medications: Allergies: Allergies   Allergen Reactions    Bactrim     Other      Sweet potatoes     Prednisone Other (See Comments)     Trouble voiding       Current Meds:   Scheduled Meds:    aspirin  81 mg Oral Daily    Pirfenidone  3 capsule Oral TID WC    famotidine  40 mg Oral Daily    fluticasone  2 spray Each Nostril Daily    tamsulosin  0.4 mg Oral Daily    Arformoterol Tartrate  15 mcg Nebulization BID    budesonide  1 mg Nebulization BID    sodium chloride flush  10 mL Intravenous 2 times per day    enoxaparin  30 mg Subcutaneous BID    dexamethasone  6 mg Oral Daily    Vitamin D  6,000 Units Oral Daily    zinc sulfate  50 mg Oral Daily    remdesivir IVPB  100 mg Intravenous Q24H     Continuous Infusions:    sodium chloride       PRN Meds: calcium carbonate, albuterol, sodium chloride flush, potassium chloride **OR** potassium alternative oral replacement **OR** potassium chloride, magnesium sulfate, promethazine **OR** ondansetron, polyethylene glycol, nicotine, acetaminophen **OR** acetaminophen, sodium chloride, guaiFENesin-dextromethorphan, ALPRAZolam, sodium chloride    Data:     Past Medical History:   has a past medical history of Asthma, COPD (chronic obstructive pulmonary disease) ([facility] and Pulmonary fibrosis ([facility] Social History:   reports that he quit smoking about 53 years ago. He has never used smokeless tobacco. He reports that he does not drink alcohol or use drugs.      Family History:   Family History   Problem Relation Age of Onset    Heart Attack Mother     Heart Attack Father        Vitals: Patchy diffuse bilateral pulmonary opacification most consistent with multifocal pneumonia including atypical viral pneumonia. Physical Examination:        Physical Exam  Constitutional:       General: He is not in acute distress. Appearance: Normal appearance. He is normal weight. He is ill-appearing. HENT:      Head: Normocephalic. Nose: Nose normal.      Mouth/Throat:      Mouth: Mucous membranes are dry. Eyes:      Extraocular Movements: Extraocular movements intact. Pupils: Pupils are equal, round, and reactive to light. Neck:      Musculoskeletal: Normal range of motion and neck supple. No neck rigidity or muscular tenderness. Cardiovascular:      Rate and Rhythm: Regular rhythm. Tachycardia present. Pulses: Normal pulses. Heart sounds: No murmur. No gallop. Pulmonary:      Effort: Respiratory distress (stable) present. Breath sounds: Normal breath sounds. Abdominal:      General: Abdomen is flat. Bowel sounds are normal. There is no distension. Palpations: Abdomen is soft. Tenderness: There is no abdominal tenderness. Musculoskeletal: Normal range of motion. Skin:     General: Skin is warm and dry. Capillary Refill: Capillary refill takes less than 2 seconds. Coloration: Skin is pale. Neurological:      Mental Status: He is alert and oriented to person, place, and time.    Psychiatric:         Mood and Affect: Mood normal.         Behavior: Behavior normal.         Assessment:        Hospital Problems           Last Modified POA    * (Principal) Pneumonia due to COVID-19 virus 12/30/2020 Yes    COPD (chronic obstructive pulmonary disease) ([address] 12/30/2020 Yes    Dyslipidemia 12/30/2020 Yes    Pulmonary interstitial fibrosis ([address] 12/30/2020 Yes    Gastroesophageal reflux disease without esophagitis 12/30/2020 Yes    Former smoker 12/30/2020 Yes    Benign prostatic hyperplasia 12/30/2020 Yes    Anxiety 12/30/2020 Yes Acute-on-chronic respiratory failure ([facility] 12/30/2020 Yes    COVID-19 12/30/2020 Yes    SIRS (systemic inflammatory response syndrome) ([facility] 12/30/2020 Yes          Plan:        1. Remdesivir - day 2/5  2. Dexamethasone day 2/5  3. Type cross and transfuse convalescent plasma when available  4. Continue scheduled as needed breathing treatments for pulmonary fibrosis, COPD, hypoxia  5. Home medication as outlined in the orders for above listed chronic medical conditions  6. Monitor renal and liver function, currently stable  7.      ARLETH Jones NP  12/31/2020  10:27 AM

## 2020-12-31 NOTE — PROGRESS NOTES
Physician Progress Note      PATIENT:               Shani Mtz  [facility] #:                  [id number]  :                       1933  ADMIT DATE:       2020 10:47 AM  DISCH DATE:  RESPONDING  PROVIDER #:        Hina Thomas MD          QUERY TEXT:    Pt admitted with COVID-19 and noted to have SIRS. If possible, please   document in progress notes and discharge summary if you are evaluating and/or   treating: The medical record reflects the following:  Risk Factors: advanced age, Covid-19 pneumonia, underlying pulmonary fibrosis,   recent admission for bacterial pneumonia  Clinical Indicators:  upon arrival:  HR 90-100s, RR 20-30/min,  Lactic   acid 3.2, CRP 71.9, WBC 12.9  Patient with acute on chronic respiratory   failure;  Per  ED provider documentation:   multifocal pneumonia and   positive COVID-19. At this time, I do believe he has sepsis secondary   pneumonia  Treatment: Supplemental oxygen per High flow nc, currently 70% FiO2 or Bipap,   SaO2 monitoring, decadron and Remdesivir, ICU admission  Options provided:  -- Sepsis due to COVID-19 pneumonia present on admission  -- COVID-19 pneumonia without sepsis  -- Other - I will add my own diagnosis  -- Disagree - Not applicable / Not valid  -- Disagree - Clinically unable to determine / Unknown  -- Refer to Clinical Documentation Reviewer    PROVIDER RESPONSE TEXT:    This patient has sepsis due to COVID-19 pneumonia which was present on   admission.     Query created by: Moy Reynoso on 2020 1:09 PM      Electronically signed by:  Hina Thomas MD 2020 3:00 PM

## 2021-01-01 ENCOUNTER — APPOINTMENT (OUTPATIENT)
Dept: GENERAL RADIOLOGY | Age: 86
DRG: 871 | End: 2021-01-01
Payer: MEDICARE

## 2021-01-01 LAB
ALBUMIN SERPL-MCNC: 2.8 G/DL (ref 3.5–5.2)
ALBUMIN/GLOBULIN RATIO: ABNORMAL (ref 1–2.5)
ALP BLD-CCNC: 50 U/L (ref 40–129)
ALT SERPL-CCNC: 12 U/L (ref 5–41)
ANION GAP SERPL CALCULATED.3IONS-SCNC: 4 MMOL/L (ref 9–17)
AST SERPL-CCNC: 14 U/L
BILIRUB SERPL-MCNC: 0.26 MG/DL (ref 0.3–1.2)
BUN BLDV-MCNC: 14 MG/DL (ref 8–23)
BUN/CREAT BLD: ABNORMAL (ref 9–20)
C-REACTIVE PROTEIN: 115.9 MG/L (ref 0–5)
CALCIUM SERPL-MCNC: 8.4 MG/DL (ref 8.6–10.4)
CHLORIDE BLD-SCNC: 92 MMOL/L (ref 98–107)
CO2: 37 MMOL/L (ref 20–31)
CREAT SERPL-MCNC: <0.4 MG/DL (ref 0.7–1.2)
D-DIMER QUANTITATIVE: 0.51 MG/L FEU (ref 0–0.59)
GFR AFRICAN AMERICAN: ABNORMAL ML/MIN
GFR NON-AFRICAN AMERICAN: ABNORMAL ML/MIN
GFR SERPL CREATININE-BSD FRML MDRD: ABNORMAL ML/MIN/{1.73_M2}
GFR SERPL CREATININE-BSD FRML MDRD: ABNORMAL ML/MIN/{1.73_M2}
GLUCOSE BLD-MCNC: 131 MG/DL (ref 70–99)
HCT VFR BLD CALC: 33.9 % (ref 40.7–50.3)
HEMOGLOBIN: 10.9 G/DL (ref 13–17)
MCH RBC QN AUTO: 30.2 PG (ref 25.2–33.5)
MCHC RBC AUTO-ENTMCNC: 32.2 G/DL (ref 28.4–34.8)
MCV RBC AUTO: 93.9 FL (ref 82.6–102.9)
NRBC AUTOMATED: 0 PER 100 WBC
PDW BLD-RTO: 14.9 % (ref 11.8–14.4)
PLATELET # BLD: 104 K/UL (ref 138–453)
PMV BLD AUTO: 11.2 FL (ref 8.1–13.5)
POTASSIUM SERPL-SCNC: 3.8 MMOL/L (ref 3.7–5.3)
RBC # BLD: 3.61 M/UL (ref 4.21–5.77)
SODIUM BLD-SCNC: 133 MMOL/L (ref 135–144)
TOTAL PROTEIN: 5.6 G/DL (ref 6.4–8.3)
WBC # BLD: 6 K/UL (ref 3.5–11.3)

## 2021-01-01 PROCEDURE — 6360000002 HC RX W HCPCS: Performed by: NURSE PRACTITIONER

## 2021-01-01 PROCEDURE — 2700000000 HC OXYGEN THERAPY PER DAY

## 2021-01-01 PROCEDURE — 6370000000 HC RX 637 (ALT 250 FOR IP): Performed by: NURSE PRACTITIONER

## 2021-01-01 PROCEDURE — 2500000003 HC RX 250 WO HCPCS: Performed by: INTERNAL MEDICINE

## 2021-01-01 PROCEDURE — 36415 COLL VENOUS BLD VENIPUNCTURE: CPT

## 2021-01-01 PROCEDURE — 94761 N-INVAS EAR/PLS OXIMETRY MLT: CPT

## 2021-01-01 PROCEDURE — 2580000003 HC RX 258: Performed by: INTERNAL MEDICINE

## 2021-01-01 PROCEDURE — 6360000002 HC RX W HCPCS: Performed by: INTERNAL MEDICINE

## 2021-01-01 PROCEDURE — 71045 X-RAY EXAM CHEST 1 VIEW: CPT

## 2021-01-01 PROCEDURE — 85379 FIBRIN DEGRADATION QUANT: CPT

## 2021-01-01 PROCEDURE — 99233 SBSQ HOSP IP/OBS HIGH 50: CPT | Performed by: INTERNAL MEDICINE

## 2021-01-01 PROCEDURE — 97163 PT EVAL HIGH COMPLEX 45 MIN: CPT

## 2021-01-01 PROCEDURE — 97530 THERAPEUTIC ACTIVITIES: CPT

## 2021-01-01 PROCEDURE — 94660 CPAP INITIATION&MGMT: CPT

## 2021-01-01 PROCEDURE — 80053 COMPREHEN METABOLIC PANEL: CPT

## 2021-01-01 PROCEDURE — 94640 AIRWAY INHALATION TREATMENT: CPT

## 2021-01-01 PROCEDURE — 6370000000 HC RX 637 (ALT 250 FOR IP): Performed by: INTERNAL MEDICINE

## 2021-01-01 PROCEDURE — 2000000000 HC ICU R&B

## 2021-01-01 PROCEDURE — 2580000003 HC RX 258: Performed by: NURSE PRACTITIONER

## 2021-01-01 PROCEDURE — 86140 C-REACTIVE PROTEIN: CPT

## 2021-01-01 PROCEDURE — 85027 COMPLETE CBC AUTOMATED: CPT

## 2021-01-01 RX ORDER — FUROSEMIDE 10 MG/ML
40 INJECTION INTRAMUSCULAR; INTRAVENOUS ONCE
Status: COMPLETED | OUTPATIENT
Start: 2021-01-01 | End: 2021-01-01

## 2021-01-01 RX ADMIN — IPRATROPIUM BROMIDE AND ALBUTEROL SULFATE 1 AMPULE: .5; 3 SOLUTION RESPIRATORY (INHALATION) at 18:38

## 2021-01-01 RX ADMIN — BUDESONIDE 1000 MCG: 0.5 SUSPENSION RESPIRATORY (INHALATION) at 06:24

## 2021-01-01 RX ADMIN — REMDESIVIR 100 MG: 100 INJECTION, POWDER, LYOPHILIZED, FOR SOLUTION INTRAVENOUS at 17:41

## 2021-01-01 RX ADMIN — FUROSEMIDE 40 MG: 10 INJECTION, SOLUTION INTRAMUSCULAR; INTRAVENOUS at 16:27

## 2021-01-01 RX ADMIN — Medication 6000 UNITS: at 12:31

## 2021-01-01 RX ADMIN — SODIUM CHLORIDE 0.3 MCG/KG/HR: 9 INJECTION, SOLUTION INTRAVENOUS at 23:15

## 2021-01-01 RX ADMIN — ENOXAPARIN SODIUM 40 MG: 40 INJECTION SUBCUTANEOUS at 09:13

## 2021-01-01 RX ADMIN — DEXMEDETOMIDINE HYDROCHLORIDE 0.3 MCG/KG/HR: 4 INJECTION, SOLUTION INTRAVENOUS at 21:45

## 2021-01-01 RX ADMIN — ARFORMOTEROL TARTRATE 15 MCG: 15 SOLUTION RESPIRATORY (INHALATION) at 06:24

## 2021-01-01 RX ADMIN — ARFORMOTEROL TARTRATE 15 MCG: 15 SOLUTION RESPIRATORY (INHALATION) at 18:38

## 2021-01-01 RX ADMIN — TAMSULOSIN HYDROCHLORIDE 0.4 MG: 0.4 CAPSULE ORAL at 20:23

## 2021-01-01 RX ADMIN — ASPIRIN 81 MG: 81 TABLET, COATED ORAL at 09:12

## 2021-01-01 RX ADMIN — SODIUM CHLORIDE, PRESERVATIVE FREE 10 ML: 5 INJECTION INTRAVENOUS at 09:13

## 2021-01-01 RX ADMIN — ACETYLCYSTEINE 600 MG: 200 SOLUTION ORAL; RESPIRATORY (INHALATION) at 06:23

## 2021-01-01 RX ADMIN — FAMOTIDINE 40 MG: 20 TABLET, FILM COATED ORAL at 20:23

## 2021-01-01 RX ADMIN — Medication 50 MG: at 09:13

## 2021-01-01 RX ADMIN — IPRATROPIUM BROMIDE AND ALBUTEROL SULFATE 1 AMPULE: .5; 3 SOLUTION RESPIRATORY (INHALATION) at 15:11

## 2021-01-01 RX ADMIN — ACETYLCYSTEINE 600 MG: 200 SOLUTION ORAL; RESPIRATORY (INHALATION) at 18:39

## 2021-01-01 RX ADMIN — IPRATROPIUM BROMIDE AND ALBUTEROL SULFATE 1 AMPULE: .5; 3 SOLUTION RESPIRATORY (INHALATION) at 13:08

## 2021-01-01 RX ADMIN — ANTACID TABLETS 500 MG: 500 TABLET, CHEWABLE ORAL at 02:50

## 2021-01-01 RX ADMIN — IPRATROPIUM BROMIDE AND ALBUTEROL SULFATE 1 AMPULE: .5; 3 SOLUTION RESPIRATORY (INHALATION) at 06:24

## 2021-01-01 RX ADMIN — FLUTICASONE PROPIONATE 2 SPRAY: 50 SPRAY, METERED NASAL at 09:13

## 2021-01-01 RX ADMIN — DEXAMETHASONE 6 MG: 4 TABLET ORAL at 09:12

## 2021-01-01 RX ADMIN — BUDESONIDE 1000 MCG: 0.5 SUSPENSION RESPIRATORY (INHALATION) at 18:38

## 2021-01-01 RX ADMIN — ANTACID TABLETS 500 MG: 500 TABLET, CHEWABLE ORAL at 17:41

## 2021-01-01 NOTE — PROGRESS NOTES
[facility]  Office: [address], DO, Sandra Hackett, DO, Winston Bassett, DO, Curt Jordan Blood, DO, Jose Gonsales MD, Tessa Chandra MD, Rosa Elena Mayfield MD, Gonsalo Carroll MD, Theron Duncan MD, Diaz Magana MD, Cody Perez MD, Edgardo Guy MD, Anuel Amador MD, Aaron Taylor DO, Marco Leary MD, Michelle Rothman MD, Oriana Lay, DO, Landen Kim MD,  Avani Vega DO, Dennie Skinner, MD, Roshan Tom MD, Yaneli Wang, [facility], [address], [facility], Brayan Adkins, CNP, Axel Gray, CNS, Arin Stovall, CNP, Priya Miller, CNP, Xuan Oreilly, CNP, Casper Harris, CNP, Leonardo Standard, CNP, JOSEPH HannaC, Jhonny Somers, [facility], Shraddha Second, CNP, Asia Cord, CNP, Jose Contras, CNP, Paul East, CNP, Tyra Meth, [facility]    Progress Note    1/1/2021    2:35 PM    Name:   Eldon Lew  MRN:     [id number]     Acct:      [de-identified]   Room:   [address] [place] [address] Day:  2  Admit Date:  12/30/2020 10:47 AM    PCP:   Jaz Hickey MD  Code Status:  DNR-CCA    Subjective:     C/C:   Chief Complaint   Patient presents with    Shortness of Breath     pt states gradually gotten worse over the past few months     Interval History Status: improved. Patient reports feeling well    Brief History:     See H&P    Review of Systems:     Constitutional:  negative for chills, fevers, sweats  Respiratory:  negative for cough, dyspnea on exertion, shortness of breath, wheezing  Cardiovascular:  negative for chest pain, chest pressure/discomfort, lower extremity edema, palpitations  Gastrointestinal:  negative for abdominal pain, constipation, diarrhea, nausea, vomiting  Neurological:  negative for dizziness, headache    Medications: Allergies:     Allergies   Allergen Reactions    Bactrim     Other      Sweet potatoes     Prednisone Other (See Comments)     Trouble voiding       Current Meds: Scheduled Meds:    furosemide  40 mg Intravenous Once    ipratropium-albuterol  1 ampule Inhalation Q4H WA    enoxaparin  40 mg Subcutaneous Daily    acetylcysteine  600 mg Oral TID    aspirin  81 mg Oral Daily    Pirfenidone  3 capsule Oral TID WC    famotidine  40 mg Oral Daily    fluticasone  2 spray Each Nostril Daily    tamsulosin  0.4 mg Oral Daily    Arformoterol Tartrate  15 mcg Nebulization BID    budesonide  1 mg Nebulization BID    sodium chloride flush  10 mL Intravenous 2 times per day    dexamethasone  6 mg Oral Daily    Vitamin D  6,000 Units Oral Daily    zinc sulfate  50 mg Oral Daily    remdesivir IVPB  100 mg Intravenous Q24H     Continuous Infusions:    dexmedetomidine HCl in NaCl 0.2 mcg/kg/hr (20 1345)    sodium chloride       PRN Meds: calcium carbonate, albuterol, sodium chloride flush, potassium chloride **OR** potassium alternative oral replacement **OR** potassium chloride, magnesium sulfate, promethazine **OR** ondansetron, polyethylene glycol, nicotine, acetaminophen **OR** acetaminophen, sodium chloride, guaiFENesin-dextromethorphan, ALPRAZolam, sodium chloride    Data:     Past Medical History:   has a past medical history of Asthma, COPD (chronic obstructive pulmonary disease) ([facility] [address] and Pulmonary fibrosis ([facility] [address] Social History:   reports that he quit smoking about 53 years ago. He has never used smokeless tobacco. He reports that he does not drink alcohol or use drugs. Family History:   Family History   Problem Relation Age of Onset    Heart Attack Mother     Heart Attack Father        Vitals:  /62   Pulse 59   Temp 99.6 °F (37.6 °C) (Oral)   Resp 26   Ht 5' 6\" (1.676 m)   Wt 154 lb (69.9 kg)   SpO2 94%   BMI 24.86 kg/m²   Temp (24hrs), Av.3 °F (36.8 °C), Min:97.3 °F (36.3 °C), Max:99.6 °F (37.6 °C)    No results for input(s): POCGLU in the last 72 hours. I/O (24Hr):     Intake/Output Summary (Last 24 hours) at 2021 1435 Patchy diffuse bilateral pulmonary opacification most consistent with multifocal pneumonia including atypical viral pneumonia. Physical Examination:        General appearance:  alert, cooperative and no distress  Mental Status:  oriented to person, place and time and normal affect  Lungs:  clear to auscultation bilaterally, normal effort  Heart:  regular rate and rhythm, no murmur  Abdomen:  soft, nontender, nondistended, normal bowel sounds, no masses, hepatomegaly, splenomegaly  Extremities:  no edema, redness, tenderness in the calves  Skin:  no gross lesions, rashes, induration    Assessment:        Hospital Problems           Last Modified POA    * (Principal) Pneumonia due to COVID-19 virus 12/30/2020 Yes    COPD (chronic obstructive pulmonary disease) ([facility] [address] 12/30/2020 Yes    Dyslipidemia 12/30/2020 Yes    Pulmonary interstitial fibrosis ([address] 12/30/2020 Yes    Gastroesophageal reflux disease without esophagitis 12/30/2020 Yes    Former smoker 12/30/2020 Yes    Benign prostatic hyperplasia 12/30/2020 Yes    Anxiety 12/30/2020 Yes    Acute-on-chronic respiratory failure ([address] 12/30/2020 Yes    COVID-19 12/30/2020 Yes    SIRS (systemic inflammatory response syndrome) ([facility] [address] 12/30/2020 Yes          Plan:        1. Initiate remdesivirday 2/5  2. Initiate dexamethasoneday 2/10  3. Type cross and transfuse 1 unit convalescent plasma now scheduled and as needed respiratory therapy as outlined in the orders for pulmonary fibrosis, COPD, Covid  4. Heated high flow O2 as well as adult BiPAP protocol at respiratory discretion  5. Maintain indwelling urinary catheter for BPH with urinary retention as well as Flomax  6. PPI for GERD, Lipitor for dyslipidemia  7. Anxiety management with home medication as well as Xanax  8. Consult pulmonology  9. Add procalcitonin, blood cultures, trend lactate, trend troponin  10.  Lovenox 30 twice daily for anticoagulation with Tyler Gómez MD  1/1/2021  2:35 PM

## 2021-01-01 NOTE — PROGRESS NOTES
Pt continues to be on HFNC, tolerating at this time. Current settings 55LPM and 65%.  SpO2 92% at this time

## 2021-01-01 NOTE — PROGRESS NOTES
[facility]  Office: [address], DO, David Clink, DO, Gianfranco Tate, DO, Guilherme Szymanski Blood, DO, Brady Phoenix MD, Laura Kelley MD, Toney Arreola MD, Poonam Horne MD, Remi Ray MD, Paty Nichols MD, Hilda Murguia MD, Raymon Stringer MD, Anuel Vieira MD, Wang Hall, DO, Ines Castellanos MD, Eve Lea MD, Kishan Garrison, DO, Mio Lucero MD,  [place], DO, Belkys Gallo MD, Gary Bullock MD, Tyree Ratliff, [facility], [address], CNP, Khari Strauss, CNP, Vic Martínez, CNS, Navin Nuñez, CNP, Jaycob Lyon, CNP, Melita Camacho, CNP, Lyssa Duncan, CNP, William Faith, CNP, Houston Mendiola PA-C, Ky Gosselin, [facility], Kaiser Vázquez, CNP, Lyubov Corey, CNP, Lesia Irby, CNP, Kelby Manuel, [facility], Kanwal Vázquez, [facility]    Progress Note    1/1/2021    2:36 PM    Name:   Katalina Alejandro  MRN:     [id number]     Acct:      [de-identified]   Room:   [address] [place] [address] Day:  2  Admit Date:  12/30/2020 10:47 AM    PCP:   Alexander Julien MD  Code Status:  DNR-CCA    Subjective:     C/C:   Chief Complaint   Patient presents with    Shortness of Breath     pt states gradually gotten worse over the past few months     Interval History Status: improved. Patient reports feeling well    Brief History:     See H&P    Review of Systems:     Constitutional:  negative for chills, fevers, sweats  Respiratory:  negative for cough, dyspnea on exertion, shortness of breath, wheezing  Cardiovascular:  negative for chest pain, chest pressure/discomfort, lower extremity edema, palpitations  Gastrointestinal:  negative for abdominal pain, constipation, diarrhea, nausea, vomiting  Neurological:  negative for dizziness, headache    Medications: Allergies:     Allergies   Allergen Reactions    Bactrim     Other      Sweet potatoes     Prednisone Other (See Comments)     Trouble voiding       Current Meds: Scheduled Meds:    furosemide  40 mg Intravenous Once    ipratropium-albuterol  1 ampule Inhalation Q4H WA    enoxaparin  40 mg Subcutaneous Daily    acetylcysteine  600 mg Oral TID    aspirin  81 mg Oral Daily    Pirfenidone  3 capsule Oral TID WC    famotidine  40 mg Oral Daily    fluticasone  2 spray Each Nostril Daily    tamsulosin  0.4 mg Oral Daily    Arformoterol Tartrate  15 mcg Nebulization BID    budesonide  1 mg Nebulization BID    sodium chloride flush  10 mL Intravenous 2 times per day    dexamethasone  6 mg Oral Daily    Vitamin D  6,000 Units Oral Daily    zinc sulfate  50 mg Oral Daily    remdesivir IVPB  100 mg Intravenous Q24H     Continuous Infusions:    dexmedetomidine HCl in NaCl 0.2 mcg/kg/hr (20 1345)    sodium chloride       PRN Meds: calcium carbonate, albuterol, sodium chloride flush, potassium chloride **OR** potassium alternative oral replacement **OR** potassium chloride, magnesium sulfate, promethazine **OR** ondansetron, polyethylene glycol, nicotine, acetaminophen **OR** acetaminophen, sodium chloride, guaiFENesin-dextromethorphan, ALPRAZolam, sodium chloride    Data:     Past Medical History:   has a past medical history of Asthma, COPD (chronic obstructive pulmonary disease) ([facility] [address] and Pulmonary fibrosis ([facility] [address] Social History:   reports that he quit smoking about 53 years ago. He has never used smokeless tobacco. He reports that he does not drink alcohol or use drugs. Family History:   Family History   Problem Relation Age of Onset    Heart Attack Mother     Heart Attack Father        Vitals:  /62   Pulse 59   Temp 99.6 °F (37.6 °C) (Oral)   Resp 26   Ht 5' 6\" (1.676 m)   Wt 154 lb (69.9 kg)   SpO2 94%   BMI 24.86 kg/m²   Temp (24hrs), Av.3 °F (36.8 °C), Min:97.3 °F (36.3 °C), Max:99.6 °F (37.6 °C)    No results for input(s): POCGLU in the last 72 hours. I/O (24Hr):     Intake/Output Summary (Last 24 hours) at 2021 1436 Patchy diffuse bilateral pulmonary opacification most consistent with multifocal pneumonia including atypical viral pneumonia. Physical Examination:        General appearance:  alert, cooperative and no distress  Mental Status:  oriented to person, place and time and normal affect  Lungs:  clear to auscultation bilaterally, normal effort  Heart:  regular rate and rhythm, no murmur  Abdomen:  soft, nontender, nondistended, normal bowel sounds, no masses, hepatomegaly, splenomegaly  Extremities:  no edema, redness, tenderness in the calves  Skin:  no gross lesions, rashes, induration    Assessment:        Hospital Problems           Last Modified POA    * (Principal) Pneumonia due to COVID-19 virus 12/30/2020 Yes    COPD (chronic obstructive pulmonary disease) ([address] 12/30/2020 Yes    Dyslipidemia 12/30/2020 Yes    Pulmonary interstitial fibrosis ([address] 12/30/2020 Yes    Gastroesophageal reflux disease without esophagitis 12/30/2020 Yes    Former smoker 12/30/2020 Yes    Benign prostatic hyperplasia 12/30/2020 Yes    Anxiety 12/30/2020 Yes    Acute-on-chronic respiratory failure ([address] 12/30/2020 Yes    COVID-19 12/30/2020 Yes    SIRS (systemic inflammatory response syndrome) ([facility] [address] 12/30/2020 Yes          Plan:        1. Initiate remdesivirday 3/5  2. Initiate dexamethasoneday 3/10  3. Type cross and transfuse 1 unit convalescent plasma now scheduled and as needed respiratory therapy as outlined in the orders for pulmonary fibrosis, COPD, Covid  4. Heated high flow O2 as well as adult BiPAP protocol at respiratory discretion  5. Maintain indwelling urinary catheter for BPH with urinary retention as well as Flomax  6. PPI for GERD, Lipitor for dyslipidemia  7. Anxiety management with home medication as well as Xanax  8. Consult pulmonology  9. Add procalcitonin, blood cultures, trend lactate, trend troponin  10.  Lovenox 30 twice daily for anticoagulation with Zeny Castle MD  1/1/2021  2:36 PM

## 2021-01-01 NOTE — PROGRESS NOTES
Physical Therapy    [facility]  Initial Assessment    NAME: Adele Gonzalez  : 1933  MRN: [id number]    Date of Service: 2021    Discharge Recommendations:  ECF with PT        Assessment   Body structures, Functions, Activity limitations: Decreased functional mobility ; Decreased endurance;Decreased strength;Decreased balance  Prognosis: Good  Decision Making: High Complexity  PT Education: PT Role;Plan of Care;General Safety; Energy Conservation  Patient Education: Handout: LE HEP in bed and sitting, Pacing & energy conservation  REQUIRES PT FOLLOW UP: Yes  Activity Tolerance  Activity Tolerance: Patient limited by endurance       Patient Diagnosis(es): The primary encounter diagnosis was COVID-19. Diagnoses of Pulmonary fibrosis ([facility] [address] and Pneumonia due to organism were also pertinent to this visit. has a past medical history of Asthma, COPD (chronic obstructive pulmonary disease) ([facility] [address] and Pulmonary fibrosis ([facility] [address] has a past surgical history that includes pre-malignant / benign skin lesion excision (2017).     Restrictions  Restrictions/Precautions  Restrictions/Precautions: General Precautions, Contact Precautions  Required Braces or Orthoses?: No  Vision/Hearing  Vision: Impaired  Vision Exceptions: Wears glasses at all times  Hearing: Exceptions to [facility]  Hearing Exceptions: Bilateral hearing aid     Subjective  General  Chart Reviewed: Yes  Patient assessed for rehabilitation services?: Yes  Response To Previous Treatment: Not applicable  Family / Caregiver Present: No  Follows Commands: Within Functional Limits  General Comment  Comments: OK for PT per Elizabeth RASHID  Pain Screening  Patient Currently in Pain: Denies  Vital Signs  Patient Currently in Pain: Denies       Orientation  Orientation  Overall Orientation Status: Within Normal Limits  Social/Functional History  Social/Functional History  Lives With: Friend(s)  Type of Home: House Home Layout: Two level, Able to Live on Main level with bedroom/bathroom  Home Access: Stairs to enter with rails  Entrance Stairs - Number of Steps: 2  Entrance Stairs - Rails: Both  Bathroom Shower/Tub: Tub/Shower unit  Bathroom Toilet: Standard  Bathroom Equipment: (Chair lift to 2nd floor)  Bathroom Accessibility: Accessible(On 2nd floor)  Receives Help From: Friend(s)  ADL Assistance: Independent(Independent prior to Sept 2020 when his medical issues started with a fall)  Homemaking Assistance: Independent  Homemaking Responsibilities: Yes  Ambulation Assistance: Independent(Prior to Sept 2020, RW w/ CGA since)  Transfer Assistance: Independent  Active : No  Occupation: Retired  Type of occupation: [id number]  Additional Comments: Pt states he has been in [employer] or Rehab facilities since Sept 2020  Cognition   Cognition  Overall Cognitive Status: WNL    Objective     Observation/Palpation  Posture: Fair(Trunk flexed posture in standing, tripod in sitting)    AROM RLE (degrees)  RLE AROM: WNL  AROM LLE (degrees)  LLE AROM : WNL  AROM RUE (degrees)  RUE AROM : WNL  AROM LUE (degrees)  LUE AROM : WNL  Strength RLE  Strength RLE: WFL  Strength LLE  Strength LLE: WFL  Strength RUE  Strength RUE: WFL  Comment: B UE's 4/5 to 4+/5  Strength LUE  Strength LUE: WFL  Comment: L ankle df 3+/5,R 4+/5, B df 4+/5, L knee and hip 4/5, R hip and knee 4+/5  Tone RLE  RLE Tone: Normotonic  Tone LLE  LLE Tone: Normotonic  Motor Control  Gross Motor?: WNL  Sensation  Overall Sensation Status: WNL  Bed mobility  Rolling to Left: Minimal assistance  Rolling to Right: Minimal assistance  Supine to Sit: Moderate assistance  Sit to Supine: Moderate assistance  Scooting:  Moderate assistance  Transfers  Sit to Stand: Minimal Assistance  Stand to sit: Minimal Assistance  Stand Pivot Transfers: Minimal Assistance  Ambulation  Ambulation?: Yes  Ambulation 1  Surface: level tile  Device: Alexander Kim Other Apparatus: O2(Hi flow)  Assistance: Minimal assistance  Distance: 4 short L side steps at bed side  Comments: BTB  Stairs/Curb  Stairs?: No     Balance  Posture: Fair  Sitting - Static: Good  Sitting - Dynamic: Good  Standing - Static: Fair;+  Standing - Dynamic: [address]  Times per week: 1-2x/day,6-7 days/week  Current Treatment Recommendations: Strengthening, Balance Training, Endurance Training, Gait Training, Functional Mobility Training, Transfer Training, Stair training  Safety Devices  Type of devices: Nurse notified, Call light within reach, Left in bed  Restraints  Initially in place: No    G-Code       OutComes Score                                                  AM-PAC Score  AM-PAC Inpatient Mobility Raw Score : 13 (01/01/21 1107)  AM-PAC Inpatient T-Scale Score : 36.74 (01/01/21 1107)  Mobility Inpatient CMS 0-100% Score: 64.91 (01/01/21 1107)  Mobility Inpatient CMS G-Code Modifier : CL (01/01/21 1107)          Goals  Short term goals  Time Frame for Short term goals: 12 treatments  Short term goal 1: Independent bed mobility/transfers  Short term goal 2: CGA ambulation w/ ' x 1/ CGA ascend/descend 2 steps w/ B HR  Short term goal 3: Good standing balance and posture  Short term goal 4: Tolerate 30 min ther act  Short term goal 5: 1/2 to 1 grade strength increase  Patient Goals   Patient goals : Back to Rehab then to [facility].        Therapy Time   Individual Concurrent Group Co-treatment   Time In [address] [place] [address]         Time Out [facility] Carlos Durant

## 2021-01-01 NOTE — PROGRESS NOTES
Pulmonary Critical Care Progress Note    Patient seen for the follow up of Pneumonia due to COVID-19 virus     Subjective:    He denies chest pain. Mild occasional cough, mostly dry. Shortness of breath not much changed. He requires oxygen at 80% high flow. He has tolerated diet. Is not ambulating. He tolerated BiPAP at 50%    Examination:    Vitals: /62   Pulse 59   Temp 99.6 °F (37.6 °C) (Oral)   Resp 26   Ht 5' 6\" (1.676 m)   Wt 154 lb (69.9 kg)   SpO2 94%   BMI 24.86 kg/m²   SpO2  Av.5 %  Min: 67 %  Max: 100 %  General appearance: alert and cooperative with exam  Neck: No JVD  Lungs: Decreased breath sound no crackles or wheezing  Heart: regular rate and rhythm, S1, S2 normal, no gallop  Abdomen: Soft, non tender, + BS  Extremities: no cyanosis or clubbing. No significant edema    LABs:    CBC:   Recent Labs     20  0450 21  0504   WBC 8.9 6.0   HGB 10.8* 10.9*   HCT 33.7* 33.9*   * 104*     BMP:   Recent Labs     20  0450 21  0504   * 133*   K 3.7 3.8   CO2 36* 37*   BUN 9 14   CREATININE <0.40* <0.40*   LABGLOM CANNOT BE CALCULATED CANNOT BE CALCULATED   GLUCOSE 103* 131*     PT/INR:   Recent Labs     20  0450   PROTIME 15.1*   INR 1.2   LIVER PROFILE:  Recent Labs     20  1748 21  0504   AST 16 14   ALT 17 12   LABALBU 3.2* 2.8*     Results for Patricia Mediate (MRN [id number]) as of 2021 13:34   Ref. Range 2020 17:48 2020 04:50   Procalcitonin Latest Ref Range: <0.09 ng/mL 0.16 (H) 0.18 (H)   Results for Patricia Mediate (MRN [id number]) as of 2021 13:34   Ref.  Range 2020 17:48 2020 04:50 2021 05:04   D-Dimer, [place] Latest Ref Range: 0.00 - 0.59 mg/L FEU 0.48  0.51     Radiology:    Chest x-ray   No significant interval change.  Diffuse bilateral patchy airspace opacity   and interstitial thickening.           Impression:  · Acute  hypoxic respiratory failure  ·   COVID-19  Pneumonia ·  mild pulmonary edema  ·  history of interstitial lung fibrosis  ·  ex-smoker possible COPD  ·  mild thrombocytopenia      Recommendations:  · oxygen with BiPAP support  ·  high-flow oxygen as tolerated  ·  start Precedex drip  ·  NPO  ·  prone as tolerated  ·  airborne isolation  ·  DuoNeb by nebulizer q.4 hours  ·  Pulmicort 0.5 q.12 hours  ·  Brovana q.12 hours  ·  Mucomyst by nebulizer 3 times daily  ·  Decadron 6 mg IV daily  ·  remdesivir by ID  ·  Lasix 40 IV x1/monitor urine output  ·  monitor D-dimer and procalcitonin  And chest x-ray  · echocardiogramn  ·  discussed with RN RT  ·  DVT prophylaxis with Lovenox /monitor platelets    Mary Kate Hoffman MD, [facility] [place] [facility]  Pulmonary Critical Care and Sleep Medicine,  [facility]  Cell: [phone]  Office: [phone]  cc35 min

## 2021-01-02 ENCOUNTER — APPOINTMENT (OUTPATIENT)
Dept: GENERAL RADIOLOGY | Age: 86
DRG: 871 | End: 2021-01-02
Payer: MEDICARE

## 2021-01-02 LAB
ALBUMIN SERPL-MCNC: 2.9 G/DL (ref 3.5–5.2)
ALBUMIN/GLOBULIN RATIO: ABNORMAL (ref 1–2.5)
ALP BLD-CCNC: 52 U/L (ref 40–129)
ALT SERPL-CCNC: 13 U/L (ref 5–41)
ANION GAP SERPL CALCULATED.3IONS-SCNC: 5 MMOL/L (ref 9–17)
AST SERPL-CCNC: 15 U/L
BILIRUB SERPL-MCNC: 0.27 MG/DL (ref 0.3–1.2)
BUN BLDV-MCNC: 15 MG/DL (ref 8–23)
BUN/CREAT BLD: ABNORMAL (ref 9–20)
CALCIUM SERPL-MCNC: 8.5 MG/DL (ref 8.6–10.4)
CHLORIDE BLD-SCNC: 92 MMOL/L (ref 98–107)
CO2: 37 MMOL/L (ref 20–31)
CREAT SERPL-MCNC: <0.4 MG/DL (ref 0.7–1.2)
GFR AFRICAN AMERICAN: ABNORMAL ML/MIN
GFR NON-AFRICAN AMERICAN: ABNORMAL ML/MIN
GFR SERPL CREATININE-BSD FRML MDRD: ABNORMAL ML/MIN/{1.73_M2}
GFR SERPL CREATININE-BSD FRML MDRD: ABNORMAL ML/MIN/{1.73_M2}
GLUCOSE BLD-MCNC: 103 MG/DL (ref 70–99)
HCT VFR BLD CALC: 34.1 % (ref 40.7–50.3)
HEMOGLOBIN: 11 G/DL (ref 13–17)
MCH RBC QN AUTO: 29.7 PG (ref 25.2–33.5)
MCHC RBC AUTO-ENTMCNC: 32.3 G/DL (ref 28.4–34.8)
MCV RBC AUTO: 92.2 FL (ref 82.6–102.9)
NRBC AUTOMATED: 0 PER 100 WBC
PDW BLD-RTO: 14.8 % (ref 11.8–14.4)
PLATELET # BLD: 104 K/UL (ref 138–453)
PMV BLD AUTO: 11.2 FL (ref 8.1–13.5)
POTASSIUM SERPL-SCNC: 3.8 MMOL/L (ref 3.7–5.3)
RBC # BLD: 3.7 M/UL (ref 4.21–5.77)
SODIUM BLD-SCNC: 134 MMOL/L (ref 135–144)
TOTAL PROTEIN: 5.8 G/DL (ref 6.4–8.3)
TROPONIN INTERP: ABNORMAL
TROPONIN T: ABNORMAL NG/ML
TROPONIN, HIGH SENSITIVITY: 23 NG/L (ref 0–22)
WBC # BLD: 6.1 K/UL (ref 3.5–11.3)

## 2021-01-02 PROCEDURE — 2700000000 HC OXYGEN THERAPY PER DAY

## 2021-01-02 PROCEDURE — 2500000003 HC RX 250 WO HCPCS: Performed by: INTERNAL MEDICINE

## 2021-01-02 PROCEDURE — 36415 COLL VENOUS BLD VENIPUNCTURE: CPT

## 2021-01-02 PROCEDURE — 99233 SBSQ HOSP IP/OBS HIGH 50: CPT | Performed by: INTERNAL MEDICINE

## 2021-01-02 PROCEDURE — 2000000000 HC ICU R&B

## 2021-01-02 PROCEDURE — 94761 N-INVAS EAR/PLS OXIMETRY MLT: CPT

## 2021-01-02 PROCEDURE — 6370000000 HC RX 637 (ALT 250 FOR IP): Performed by: INTERNAL MEDICINE

## 2021-01-02 PROCEDURE — 71045 X-RAY EXAM CHEST 1 VIEW: CPT

## 2021-01-02 PROCEDURE — 2580000003 HC RX 258: Performed by: INTERNAL MEDICINE

## 2021-01-02 PROCEDURE — 84484 ASSAY OF TROPONIN QUANT: CPT

## 2021-01-02 PROCEDURE — 6360000002 HC RX W HCPCS: Performed by: NURSE PRACTITIONER

## 2021-01-02 PROCEDURE — 6360000002 HC RX W HCPCS: Performed by: INTERNAL MEDICINE

## 2021-01-02 PROCEDURE — 6370000000 HC RX 637 (ALT 250 FOR IP): Performed by: NURSE PRACTITIONER

## 2021-01-02 PROCEDURE — 85027 COMPLETE CBC AUTOMATED: CPT

## 2021-01-02 PROCEDURE — 80053 COMPREHEN METABOLIC PANEL: CPT

## 2021-01-02 PROCEDURE — 94640 AIRWAY INHALATION TREATMENT: CPT

## 2021-01-02 RX ORDER — SODIUM CHLORIDE 0.9 % (FLUSH) 0.9 %
10 SYRINGE (ML) INJECTION EVERY 12 HOURS SCHEDULED
Status: DISCONTINUED | OUTPATIENT
Start: 2021-01-02 | End: 2021-01-14 | Stop reason: HOSPADM

## 2021-01-02 RX ORDER — MIDODRINE HYDROCHLORIDE 5 MG/1
5 TABLET ORAL ONCE
Status: COMPLETED | OUTPATIENT
Start: 2021-01-02 | End: 2021-01-02

## 2021-01-02 RX ORDER — FUROSEMIDE 10 MG/ML
40 INJECTION INTRAMUSCULAR; INTRAVENOUS 2 TIMES DAILY
Status: DISCONTINUED | OUTPATIENT
Start: 2021-01-02 | End: 2021-01-04

## 2021-01-02 RX ORDER — MIDODRINE HYDROCHLORIDE 5 MG/1
5 TABLET ORAL
Status: DISCONTINUED | OUTPATIENT
Start: 2021-01-02 | End: 2021-01-14 | Stop reason: HOSPADM

## 2021-01-02 RX ORDER — SODIUM CHLORIDE 0.9 % (FLUSH) 0.9 %
10 SYRINGE (ML) INJECTION PRN
Status: DISCONTINUED | OUTPATIENT
Start: 2021-01-02 | End: 2021-01-14 | Stop reason: HOSPADM

## 2021-01-02 RX ORDER — ALPRAZOLAM 0.25 MG/1
0.25 TABLET ORAL 4 TIMES DAILY PRN
Status: DISCONTINUED | OUTPATIENT
Start: 2021-01-02 | End: 2021-01-14 | Stop reason: HOSPADM

## 2021-01-02 RX ADMIN — REMDESIVIR 100 MG: 100 INJECTION, POWDER, LYOPHILIZED, FOR SOLUTION INTRAVENOUS at 17:16

## 2021-01-02 RX ADMIN — ASPIRIN 81 MG: 81 TABLET, COATED ORAL at 09:49

## 2021-01-02 RX ADMIN — Medication 50 MG: at 09:49

## 2021-01-02 RX ADMIN — IPRATROPIUM BROMIDE AND ALBUTEROL SULFATE 1 AMPULE: .5; 3 SOLUTION RESPIRATORY (INHALATION) at 08:15

## 2021-01-02 RX ADMIN — BUDESONIDE 1000 MCG: 0.5 SUSPENSION RESPIRATORY (INHALATION) at 08:15

## 2021-01-02 RX ADMIN — ACETYLCYSTEINE 600 MG: 200 SOLUTION ORAL; RESPIRATORY (INHALATION) at 20:45

## 2021-01-02 RX ADMIN — DEXAMETHASONE 6 MG: 4 TABLET ORAL at 09:49

## 2021-01-02 RX ADMIN — Medication 6000 UNITS: at 09:49

## 2021-01-02 RX ADMIN — SODIUM CHLORIDE 0.2 MCG/KG/HR: 9 INJECTION, SOLUTION INTRAVENOUS at 21:09

## 2021-01-02 RX ADMIN — ARFORMOTEROL TARTRATE 15 MCG: 15 SOLUTION RESPIRATORY (INHALATION) at 08:15

## 2021-01-02 RX ADMIN — MIDODRINE HYDROCHLORIDE 5 MG: 5 TABLET ORAL at 13:50

## 2021-01-02 RX ADMIN — IPRATROPIUM BROMIDE AND ALBUTEROL SULFATE 1 AMPULE: .5; 3 SOLUTION RESPIRATORY (INHALATION) at 15:23

## 2021-01-02 RX ADMIN — ENOXAPARIN SODIUM 40 MG: 40 INJECTION SUBCUTANEOUS at 09:53

## 2021-01-02 RX ADMIN — IPRATROPIUM BROMIDE AND ALBUTEROL SULFATE 1 AMPULE: .5; 3 SOLUTION RESPIRATORY (INHALATION) at 11:46

## 2021-01-02 RX ADMIN — MIDODRINE HYDROCHLORIDE 5 MG: 5 TABLET ORAL at 10:44

## 2021-01-02 RX ADMIN — ARFORMOTEROL TARTRATE 15 MCG: 15 SOLUTION RESPIRATORY (INHALATION) at 20:14

## 2021-01-02 RX ADMIN — IPRATROPIUM BROMIDE AND ALBUTEROL SULFATE 1 AMPULE: .5; 3 SOLUTION RESPIRATORY (INHALATION) at 20:13

## 2021-01-02 RX ADMIN — FUROSEMIDE 40 MG: 10 INJECTION, SOLUTION INTRAMUSCULAR; INTRAVENOUS at 17:16

## 2021-01-02 RX ADMIN — MIDODRINE HYDROCHLORIDE 5 MG: 5 TABLET ORAL at 17:16

## 2021-01-02 RX ADMIN — ANTACID TABLETS 500 MG: 500 TABLET, CHEWABLE ORAL at 10:00

## 2021-01-02 RX ADMIN — BUDESONIDE 1000 MCG: 0.5 SUSPENSION RESPIRATORY (INHALATION) at 20:13

## 2021-01-02 RX ADMIN — ALPRAZOLAM 1 MG: 1 TABLET ORAL at 12:41

## 2021-01-02 ASSESSMENT — PAIN SCALES - GENERAL: PAINLEVEL_OUTOF10: 0

## 2021-01-02 NOTE — PROGRESS NOTES
Physical Therapy  DATE: 2021    NAME: Randall Shelby  MRN: [id number]   : 1933    Patient not seen this date for Physical Therapy due to:  [] Blood transfusion in progress  [x] Cancel by RN  [] Hemodialysis  []  Refusal by Patient   [] Spine Precautions   [] Strict Bedrest  [] Surgery  [] Testing      [] Other        [] PT being discontinued at this time. Patient independent. No further needs. [] PT being discontinued at this time as the patient has been transferred to hospice care. No further needs.     [address], PT

## 2021-01-02 NOTE — PROGRESS NOTES
Katerine Frances [facility] [place] [facility]) phoned unit and RN updated her at this time.

## 2021-01-02 NOTE — FLOWSHEET NOTE
01/02/21 1004   Provider Notification   Reason for Communication Review case   Provider Name Dr. Segovia Seek   Provider Notification Physician   Method of Communication Face to face   Response No new orders   MD rounded, RN at bedside. RN updated him on low BP readings, order received for midodrine 5mg TID. See orders.

## 2021-01-02 NOTE — PROGRESS NOTES
Pulmonary Critical Care Progress Note    Patient seen for the follow up of Pneumonia due to COVID-19 virus     Subjective:    He is breathing more labored and desaturated on BiPAP and had to be pushed to 100% FiO2. Delsa Severance He seems to be more anxious on Precedex. Denies chest pain. Mild occasional cough, mostly dry. He had tolerated diet. Examination:    Vitals: BP (!) 91/51   Pulse 72   Temp 96.1 °F (35.6 °C) (Axillary)   Resp 27   Ht 5' 6\" (1.676 m)   Wt 140 lb 1.6 oz (63.5 kg)   SpO2 99%   BMI 22.61 kg/m²   SpO2  Av.6 %  Min: 90 %  Max: 100 %  General appearance: alert anxious  Neck: No JVD  Lungs: Decreased breath sound no crackles or wheezing  Heart: regular rate and rhythm, S1, S2 normal, no gallop  Abdomen: Soft, non tender, + BS  Extremities: no cyanosis or clubbing. No significant edema    LABs:    CBC:   Recent Labs     21  0504 21   WBC 6.0 6.1   HGB 10.9* 11.0*   HCT 33.9* 34.1*   * 104*     BMP:   Recent Labs     21  0504 21  05   * 134*   K 3.8 3.8   CO2 37* 37*   BUN 14 15   CREATININE <0.40* <0.40*   LABGLOM CANNOT BE CALCULATED CANNOT BE CALCULATED   GLUCOSE 131* 103*     PT/INR:   Recent Labs     20  0450   PROTIME 15.1*   INR 1.2   LIVER PROFILE:  Recent Labs     21  0504 21  0528   AST 14 15   ALT 12 13   LABALBU 2.8* 2.9*     Results for Farrah Kent (MRN [id number]) as of 2021 13:34   Ref. Range 2020 17:48 2020 04:50   Procalcitonin Latest Ref Range: <0.09 ng/mL 0.16 (H) 0.18 (H)   Results for Farrah Kent (MRN [id number]) as of 2021 13:34   Ref. Range 2020 17:48 2020 04:50 2021 05:04   D-Dimer, Quant Latest Ref Range: 0.00 - 0.59 mg/L FEU 0.48  0.51   Results for Farrah Kent (MRN [id number]) as of 2021 14:59   Ref.  Range 2020 10:53   Pro-BNP Latest Ref Range: <300 pg/mL 842 (H)     Radiology:    Chest x-ray

## 2021-01-02 NOTE — PLAN OF CARE
Problem: Airway Clearance - Ineffective  Goal: Achieve or maintain patent airway  Outcome: Ongoing     Problem: Gas Exchange - Impaired  Goal: Absence of hypoxia  1/1/2021 2226 by Pily Crouch RN  Outcome: Ongoing     Problem: Gas Exchange - Impaired  Goal: Promote optimal lung function  1/1/2021 2226 by Pily Crouch RN  Outcome: Ongoing     Problem: Body Temperature -  Risk of, Imbalanced  Goal: Will regain or maintain usual level of consciousness  Outcome: Ongoing     Problem: Body Temperature -  Risk of, Imbalanced  Goal: Complications related to the disease process, condition or treatment will be avoided or minimized  Outcome: Ongoing     Problem:  Body Temperature -  Risk of, Imbalanced  Goal: Ability to maintain a body temperature within defined limits  Outcome: Ongoing     Problem: Isolation Precautions - Risk of Spread of Infection  Goal: Prevent transmission of infection  Outcome: Ongoing     Problem: Risk for Fluid Volume Deficit  Goal: Maintain normal heart rhythm  Outcome: Ongoing     Problem: Nutrition Deficits  Goal: Optimize nutrtional status  Outcome: Ongoing     Problem: Risk for Fluid Volume Deficit  Goal: Maintain normal serum potassium, sodium, calcium, phosphorus, and pH  Outcome: Ongoing     Problem: Fatigue  Goal: Verbalize increase energy and improved vitality  Outcome: Ongoing     Problem: Patient Education: Go to Patient Education Activity  Goal: Patient/Family Education  Outcome: Ongoing

## 2021-01-02 NOTE — PROGRESS NOTES
[facility]  Office: [address], DO, Srirambrii Chong, DO, Royalgraciela Daily, DO, Yehuda Lockejesus Blood, DO, Shamir Castaneda MD, Mickey Morel MD, William Rede MD, Elijah Link MD, Kim Lyons MD, Germaine Roque MD, Rhett Fulton MD, Shivam Young MD, Anuel Hinton MD, Madina Mariee, DO, Sharda Coppola MD, Roselyn Alarcon MD, Candie Boas, DO, Suki Fountain MD,  Bunny Edwards DO, Katerine Lamar MD, Rueben Severin, MD, [facility], [facility], [address], CNP, Ashley Giron, CNP, Rinku Whitaker, CNS, Fran Altamirano, CNP, Lamberto Ferrari, CNP, Diana Teague, CNP, Mariola Deras, CNP, Jamilah Arango, CNP, Redd Nam PA-C, Anjelica Bagley, [facility], Andrey Rascon, CNP, Noble, CNP, Dagoberto Crook, CNP, Alana Simon, CNP, Yoly Kitdai, [facility]    Progress Note    1/2/2021    1:27 PM    Name:   Tyler Ramírez  MRN:     [id number]     Acct:      [de-identified]   Room:   [address] [place] [address] Day:  3  Admit Date:  12/30/2020 10:47 AM    PCP:   Bonita Angeles MD  Code Status:  DNR-CCA    Subjective:     C/C:   Chief Complaint   Patient presents with    Shortness of Breath     pt states gradually gotten worse over the past few months     Interval History Status: improved. Patient reports feeling well    Brief History:     See H&P    Review of Systems:     Constitutional:  negative for chills, fevers, sweats  Respiratory:  negative for cough, dyspnea on exertion, shortness of breath, wheezing  Cardiovascular:  negative for chest pain, chest pressure/discomfort, lower extremity edema, palpitations  Gastrointestinal:  negative for abdominal pain, constipation, diarrhea, nausea, vomiting  Neurological:  negative for dizziness, headache    Medications: Allergies:     Allergies   Allergen Reactions    Bactrim     Other      Sweet potatoes     Prednisone Other (See Comments)     Trouble voiding       Current Meds: Scheduled Meds:    midodrine  5 mg Oral TID     sodium chloride flush  10 mL Intravenous 2 times per day    ipratropium-albuterol  1 ampule Inhalation Q4H WA    enoxaparin  40 mg Subcutaneous Daily    acetylcysteine  600 mg Oral TID    aspirin  81 mg Oral Daily    Pirfenidone  3 capsule Oral TID     famotidine  40 mg Oral Daily    fluticasone  2 spray Each Nostril Daily    tamsulosin  0.4 mg Oral Daily    Arformoterol Tartrate  15 mcg Nebulization BID    budesonide  1 mg Nebulization BID    sodium chloride flush  10 mL Intravenous 2 times per day    dexamethasone  6 mg Oral Daily    Vitamin D  6,000 Units Oral Daily    zinc sulfate  50 mg Oral Daily    remdesivir IVPB  100 mg Intravenous Q24H     Continuous Infusions:    dexmedetomidine (PRECEDEX) IV infusion 0.3 mcg/kg/hr (21 5599)    sodium chloride       PRN Meds: sodium chloride flush, calcium carbonate, albuterol, sodium chloride flush, potassium chloride **OR** potassium alternative oral replacement **OR** potassium chloride, magnesium sulfate, promethazine **OR** ondansetron, polyethylene glycol, nicotine, acetaminophen **OR** acetaminophen, sodium chloride, guaiFENesin-dextromethorphan, ALPRAZolam, sodium chloride    Data:     Past Medical History:   has a past medical history of Asthma, COPD (chronic obstructive pulmonary disease) ([facility] [address] and [facility] Social History:   reports that he quit smoking about 53 years ago. He has never used smokeless tobacco. He reports that he does not drink alcohol or use drugs.      Family History:   Family History   Problem Relation Age of Onset    Heart Attack Mother     Heart Attack Father        Vitals:  BP (!) 87/45   Pulse 67   Temp 95.9 °F (35.5 °C) (Oral)   Resp (!) 40   Ht 5' 6\" (1.676 m)   Wt 140 lb 1.6 oz (63.5 kg)   SpO2 90%   BMI 22.61 kg/m²   Temp (24hrs), Av.7 °F (36.5 °C), Min:95.9 °F (35.5 °C), Max:98.6 °F (37 °C) No results for input(s): POCGLU in the last 72 hours. I/O (24Hr): Intake/Output Summary (Last 24 hours) at 1/2/2021 1327  Last data filed at 1/2/2021 0556  Gross per 24 hour   Intake    Output 3675 ml   Net -3675 ml       Labs:  Hematology:  Recent Labs     12/30/20 1748 12/31/20 0450 01/01/21 0504 01/02/21 0528   WBC  --  8.9 6.0 6.1   RBC  --  3.60* 3.61* 3.70*   HGB  --  10.8* 10.9* 11.0*   HCT  --  33.7* 33.9* 34.1*   MCV  --  93.6 93.9 92.2   MCH  --  30.0 30.2 29.7   MCHC  --  32.0 32.2 32.3   RDW  --  14.9* 14.9* 14.8*   PLT  --  102* 104* 104*   MPV  --  11.4 11.2 11.2   CRP 71.9*  --  115.9*  --    INR  --  1.2  --   --    DDIMER 0.48  --  0.51  --      Chemistry:  Recent Labs     12/30/20 1748 12/31/20 0450 01/01/21 0504 01/02/21 0528   NA  --  134* 133* 134*   K  --  3.7 3.8 3.8   CL  --  94* 92* 92*   CO2  --  36* 37* 37*   GLUCOSE  --  103* 131* 103*   BUN  --  9 14 15   CREATININE  --  <0.40* <0.40* <0.40*   ANIONGAP  --  4* 4* 5*   LABGLOM  --  CANNOT BE CALCULATED CANNOT BE CALCULATED CANNOT BE CALCULATED   GFRAA  --  CANNOT BE CALCULATED CANNOT BE CALCULATED CANNOT BE CALCULATED   CALCIUM  --  8.4* 8.4* 8.5*   TROPHS 34*  --   --   --      Recent Labs     12/30/20 1748 01/01/21 0504 01/02/21 0528   PROT 5.7* 5.6* 5.8*   LABALBU 3.2* 2.8* 2.9*   AST 16 14 15   ALT 17 12 13   ALKPHOS 46 50 52   BILITOT 0.31 0.26* 0.27*   BILIDIR 0.12  --   --      ABG:No results found for: POCPH, PHART, PH, POCPCO2, OMZ5JZR, PCO2, POCPO2, PO2ART, PO2, POCHCO3, VQN4OUI, HCO3, NBEA, PBEA, BEART, BE, THGBART, THB, DFZ9IQP, DFCU6UIY, X2ZKYZCI, O2SAT, FIO2  Lab Results   Component Value Date/Time    [facility] 12/30/2020 12:45 PM     Lab Results   Component Value Date/Time    CULTURE NO GROWTH 3 DAYS 12/30/2020 12:45 PM       Radiology:  Xr Chest Portable    Result Date: 1/1/2021  No significant interval change. Diffuse bilateral patchy airspace opacity and interstitial thickening. Xr Chest Portable    Result Date: 12/30/2020  Patchy diffuse bilateral pulmonary opacification most consistent with multifocal pneumonia including atypical viral pneumonia. Physical Examination:        General appearance:  alert, cooperative and no distress  Mental Status:  oriented to person, place and time and normal affect  Lungs:  clear to auscultation bilaterally, normal effort  Heart:  regular rate and rhythm, no murmur  Abdomen:  soft, nontender, nondistended, normal bowel sounds, no masses, hepatomegaly, splenomegaly  Extremities:  no edema, redness, tenderness in the calves  Skin:  no gross lesions, rashes, induration    Assessment:        Hospital Problems           Last Modified POA    * (Principal) Pneumonia due to COVID-19 virus 12/30/2020 Yes    COPD (chronic obstructive pulmonary disease) ([facility] [address] [facility] 12/30/2020 Yes    Dyslipidemia 12/30/2020 Yes    Pulmonary interstitial fibrosis ([facility] [address] [facility] 12/30/2020 Yes    Gastroesophageal reflux disease without esophagitis 12/30/2020 Yes    Former smoker 12/30/2020 Yes    Benign prostatic hyperplasia 12/30/2020 Yes    Anxiety 12/30/2020 Yes    Acute-on-chronic respiratory failure ([address] 12/30/2020 Yes    COVID-19 12/30/2020 Yes    SIRS (systemic inflammatory response syndrome) ([facility] [address] 12/30/2020 Yes          Plan:        1. Initiate remdesivirday 4/5  2. Initiate dexamethasoneday 4/10  3. Type cross and transfuse 1 unit convalescent plasma now scheduled and as needed respiratory therapy as outlined in the orders for pulmonary fibrosis, COPD, Covid  4. Heated high flow O2 as well as adult BiPAP protocol at respiratory discretion  5. Midodrine added due to hypotension  6. Maintain indwelling urinary catheter for BPH with urinary retention as well as Flomax  7. PPI for GERD, Lipitor for dyslipidemia  8. Anxiety management with home medication as well as Xanax  9. Consult pulmonology  10.  Add procalcitonin, blood cultures, trend lactate, trend troponin 11. Lovenox 30 twice daily for anticoagulation with Ioana Byrd MD  1/2/2021  1:27 PM

## 2021-01-03 ENCOUNTER — APPOINTMENT (OUTPATIENT)
Dept: GENERAL RADIOLOGY | Age: 86
DRG: 871 | End: 2021-01-03
Payer: MEDICARE

## 2021-01-03 LAB
ALBUMIN SERPL-MCNC: 2.9 G/DL (ref 3.5–5.2)
ALBUMIN/GLOBULIN RATIO: ABNORMAL (ref 1–2.5)
ALP BLD-CCNC: 55 U/L (ref 40–129)
ALT SERPL-CCNC: 17 U/L (ref 5–41)
ANION GAP SERPL CALCULATED.3IONS-SCNC: 5 MMOL/L (ref 9–17)
AST SERPL-CCNC: 16 U/L
BILIRUB SERPL-MCNC: 0.45 MG/DL (ref 0.3–1.2)
BUN BLDV-MCNC: 16 MG/DL (ref 8–23)
BUN/CREAT BLD: ABNORMAL (ref 9–20)
CALCIUM SERPL-MCNC: 8.7 MG/DL (ref 8.6–10.4)
CHLORIDE BLD-SCNC: 89 MMOL/L (ref 98–107)
CO2: 39 MMOL/L (ref 20–31)
CREAT SERPL-MCNC: <0.4 MG/DL (ref 0.7–1.2)
D-DIMER QUANTITATIVE: 0.46 MG/L FEU (ref 0–0.59)
GFR AFRICAN AMERICAN: ABNORMAL ML/MIN
GFR NON-AFRICAN AMERICAN: ABNORMAL ML/MIN
GFR SERPL CREATININE-BSD FRML MDRD: ABNORMAL ML/MIN/{1.73_M2}
GFR SERPL CREATININE-BSD FRML MDRD: ABNORMAL ML/MIN/{1.73_M2}
GLUCOSE BLD-MCNC: 100 MG/DL (ref 70–99)
HCT VFR BLD CALC: 35.9 % (ref 40.7–50.3)
HEMOGLOBIN: 11.6 G/DL (ref 13–17)
MCH RBC QN AUTO: 29.9 PG (ref 25.2–33.5)
MCHC RBC AUTO-ENTMCNC: 32.3 G/DL (ref 28.4–34.8)
MCV RBC AUTO: 92.5 FL (ref 82.6–102.9)
NRBC AUTOMATED: 0 PER 100 WBC
PDW BLD-RTO: 14.8 % (ref 11.8–14.4)
PLATELET # BLD: 105 K/UL (ref 138–453)
PMV BLD AUTO: 11.2 FL (ref 8.1–13.5)
POTASSIUM SERPL-SCNC: 3.7 MMOL/L (ref 3.7–5.3)
RBC # BLD: 3.88 M/UL (ref 4.21–5.77)
SODIUM BLD-SCNC: 133 MMOL/L (ref 135–144)
TOTAL PROTEIN: 5.9 G/DL (ref 6.4–8.3)
WBC # BLD: 6.9 K/UL (ref 3.5–11.3)

## 2021-01-03 PROCEDURE — 6360000002 HC RX W HCPCS: Performed by: NURSE PRACTITIONER

## 2021-01-03 PROCEDURE — 2700000000 HC OXYGEN THERAPY PER DAY

## 2021-01-03 PROCEDURE — 86140 C-REACTIVE PROTEIN: CPT

## 2021-01-03 PROCEDURE — 85379 FIBRIN DEGRADATION QUANT: CPT

## 2021-01-03 PROCEDURE — 71045 X-RAY EXAM CHEST 1 VIEW: CPT

## 2021-01-03 PROCEDURE — 6370000000 HC RX 637 (ALT 250 FOR IP): Performed by: INTERNAL MEDICINE

## 2021-01-03 PROCEDURE — 6360000002 HC RX W HCPCS: Performed by: INTERNAL MEDICINE

## 2021-01-03 PROCEDURE — 2580000003 HC RX 258: Performed by: INTERNAL MEDICINE

## 2021-01-03 PROCEDURE — 94761 N-INVAS EAR/PLS OXIMETRY MLT: CPT

## 2021-01-03 PROCEDURE — 2000000000 HC ICU R&B

## 2021-01-03 PROCEDURE — 6370000000 HC RX 637 (ALT 250 FOR IP): Performed by: NURSE PRACTITIONER

## 2021-01-03 PROCEDURE — 85027 COMPLETE CBC AUTOMATED: CPT

## 2021-01-03 PROCEDURE — 94640 AIRWAY INHALATION TREATMENT: CPT

## 2021-01-03 PROCEDURE — 99232 SBSQ HOSP IP/OBS MODERATE 35: CPT | Performed by: INTERNAL MEDICINE

## 2021-01-03 PROCEDURE — 80053 COMPREHEN METABOLIC PANEL: CPT

## 2021-01-03 PROCEDURE — 2500000003 HC RX 250 WO HCPCS: Performed by: INTERNAL MEDICINE

## 2021-01-03 RX ORDER — QUETIAPINE FUMARATE 25 MG/1
25 TABLET, FILM COATED ORAL 2 TIMES DAILY
Status: DISCONTINUED | OUTPATIENT
Start: 2021-01-03 | End: 2021-01-05

## 2021-01-03 RX ORDER — METHYLPREDNISOLONE SODIUM SUCCINATE 125 MG/2ML
60 INJECTION, POWDER, LYOPHILIZED, FOR SOLUTION INTRAMUSCULAR; INTRAVENOUS ONCE
Status: COMPLETED | OUTPATIENT
Start: 2021-01-03 | End: 2021-01-03

## 2021-01-03 RX ORDER — POLYETHYLENE GLYCOL 3350 17 G/17G
17 POWDER, FOR SOLUTION ORAL DAILY PRN
Status: DISCONTINUED | OUTPATIENT
Start: 2021-01-03 | End: 2021-01-14 | Stop reason: HOSPADM

## 2021-01-03 RX ORDER — SODIUM PHOSPHATE, DIBASIC AND SODIUM PHOSPHATE, MONOBASIC 7; 19 G/133ML; G/133ML
1 ENEMA RECTAL
Status: ACTIVE | OUTPATIENT
Start: 2021-01-03 | End: 2021-01-03

## 2021-01-03 RX ADMIN — BUDESONIDE 1000 MCG: 0.5 SUSPENSION RESPIRATORY (INHALATION) at 22:19

## 2021-01-03 RX ADMIN — MIDODRINE HYDROCHLORIDE 5 MG: 5 TABLET ORAL at 12:30

## 2021-01-03 RX ADMIN — SODIUM CHLORIDE, PRESERVATIVE FREE 10 ML: 5 INJECTION INTRAVENOUS at 09:00

## 2021-01-03 RX ADMIN — ASPIRIN 81 MG: 81 TABLET, COATED ORAL at 08:42

## 2021-01-03 RX ADMIN — SODIUM CHLORIDE 0.3 MCG/KG/HR: 9 INJECTION, SOLUTION INTRAVENOUS at 08:50

## 2021-01-03 RX ADMIN — ACETYLCYSTEINE 600 MG: 200 SOLUTION ORAL; RESPIRATORY (INHALATION) at 22:19

## 2021-01-03 RX ADMIN — MIDODRINE HYDROCHLORIDE 5 MG: 5 TABLET ORAL at 17:00

## 2021-01-03 RX ADMIN — Medication 6000 UNITS: at 12:00

## 2021-01-03 RX ADMIN — ARFORMOTEROL TARTRATE 15 MCG: 15 SOLUTION RESPIRATORY (INHALATION) at 22:19

## 2021-01-03 RX ADMIN — IPRATROPIUM BROMIDE AND ALBUTEROL SULFATE 1 AMPULE: .5; 3 SOLUTION RESPIRATORY (INHALATION) at 22:19

## 2021-01-03 RX ADMIN — IPRATROPIUM BROMIDE AND ALBUTEROL SULFATE 1 AMPULE: .5; 3 SOLUTION RESPIRATORY (INHALATION) at 11:27

## 2021-01-03 RX ADMIN — FUROSEMIDE 40 MG: 10 INJECTION, SOLUTION INTRAMUSCULAR; INTRAVENOUS at 09:40

## 2021-01-03 RX ADMIN — DEXAMETHASONE 6 MG: 4 TABLET ORAL at 08:42

## 2021-01-03 RX ADMIN — FUROSEMIDE 40 MG: 10 INJECTION, SOLUTION INTRAMUSCULAR; INTRAVENOUS at 18:00

## 2021-01-03 RX ADMIN — IPRATROPIUM BROMIDE AND ALBUTEROL SULFATE 1 AMPULE: .5; 3 SOLUTION RESPIRATORY (INHALATION) at 08:22

## 2021-01-03 RX ADMIN — TAMSULOSIN HYDROCHLORIDE 0.4 MG: 0.4 CAPSULE ORAL at 21:23

## 2021-01-03 RX ADMIN — ENOXAPARIN SODIUM 40 MG: 40 INJECTION SUBCUTANEOUS at 08:42

## 2021-01-03 RX ADMIN — IPRATROPIUM BROMIDE AND ALBUTEROL SULFATE 1 AMPULE: .5; 3 SOLUTION RESPIRATORY (INHALATION) at 15:09

## 2021-01-03 RX ADMIN — METHYLPREDNISOLONE SODIUM SUCCINATE 60 MG: 125 INJECTION, POWDER, FOR SOLUTION INTRAMUSCULAR; INTRAVENOUS at 17:00

## 2021-01-03 RX ADMIN — SODIUM CHLORIDE, PRESERVATIVE FREE 10 ML: 5 INJECTION INTRAVENOUS at 21:23

## 2021-01-03 RX ADMIN — BUDESONIDE 1000 MCG: 0.5 SUSPENSION RESPIRATORY (INHALATION) at 08:22

## 2021-01-03 RX ADMIN — FAMOTIDINE 40 MG: 20 TABLET, FILM COATED ORAL at 21:23

## 2021-01-03 RX ADMIN — REMDESIVIR 100 MG: 100 INJECTION, POWDER, LYOPHILIZED, FOR SOLUTION INTRAVENOUS at 18:00

## 2021-01-03 RX ADMIN — ARFORMOTEROL TARTRATE 15 MCG: 15 SOLUTION RESPIRATORY (INHALATION) at 08:22

## 2021-01-03 RX ADMIN — MIDODRINE HYDROCHLORIDE 5 MG: 5 TABLET ORAL at 08:42

## 2021-01-03 RX ADMIN — QUETIAPINE FUMARATE 25 MG: 25 TABLET ORAL at 21:23

## 2021-01-03 ASSESSMENT — PAIN SCALES - GENERAL: PAINLEVEL_OUTOF10: 0

## 2021-01-03 NOTE — PROGRESS NOTES
Patient placed on BIPAP at this time due to increased respiratory rate and increased WOB. Patient is anxious. Precedex gtt titrated.

## 2021-01-03 NOTE — PROGRESS NOTES
[facility]  Office: [address], DO, Santi Zarate, DO, Willie Farah, DO, Antony Ale Rodríguez, DO, Kristi Berry MD, Ann Blackman MD, Sherice Canales MD, Nayeli Mijares MD, Trina Pinzon MD, Siobhan Marroquin MD, Sharma Babinski, MD, Giles Seals MD, Anuel Valencia MD, Favio Myers, DO, Duc Davis MD, Esperanza Amaya MD, Junior Davis, DO, Mohan Martinez MD,  Farhana Sierra, DO, Jarad Moss MD, Mal Ruff MD, Beto Clements, [facility], [facility], [facility], Esdras Whitehead, CNP, Coretta Price, CNS, Marilyn Peng, CNP, Quinn Lopez, CNP, Lori Goldberg, CNP, Amalia Atkinson, CNP, Yonathan Odonnell, CNP, Estela Klein PA-C, Meaghan Abdullahi, [facility], Niels Atkinson, CNP, Jony Perez, CNP, Lamine Whitley, CNP, Sammy Pyle, [facility], [facility], [facility]    Progress Note    1/3/2021    11:34 AM    Name:   Consuelo Holley  MRN:     [id number]     Acct:      [de-identified]   Room:   [address] [place] [address] Day:  4  Admit Date:  12/30/2020 10:47 AM    PCP:   Andres Rey MD  Code Status:  DNR-CCA    Subjective:     C/C:   Chief Complaint   Patient presents with    Shortness of Breath     pt states gradually gotten worse over the past few months     Interval History Status: improved. Patient was seen and evaluated at bedside this morning. Patient continues to be on BiPAP. Patient denies any worsening complaints. He does mention up having abdominal cramps and would like to have something to help him have a bowel movement.     Brief History:     See H&P    Review of Systems:     Constitutional:  negative for chills, fevers, sweats  Respiratory:  negative for cough, dyspnea on exertion, shortness of breath, wheezing  Cardiovascular:  negative for chest pain, chest pressure/discomfort, lower extremity edema, palpitations  Gastrointestinal:  negative for abdominal pain, constipation, diarrhea, nausea, vomiting Neurological:  negative for dizziness, headache    Medications: Allergies: Allergies   Allergen Reactions    Bactrim     Other      Sweet potatoes     Prednisone Other (See Comments)     Trouble voiding       Current Meds:   Scheduled Meds:    midodrine  5 mg Oral TID     sodium chloride flush  10 mL Intravenous 2 times per day    furosemide  40 mg Intravenous BID    ipratropium-albuterol  1 ampule Inhalation Q4H WA    enoxaparin  40 mg Subcutaneous Daily    acetylcysteine  600 mg Oral TID    aspirin  81 mg Oral Daily    Pirfenidone  3 capsule Oral TID WC    famotidine  40 mg Oral Daily    fluticasone  2 spray Each Nostril Daily    tamsulosin  0.4 mg Oral Daily    Arformoterol Tartrate  15 mcg Nebulization BID    budesonide  1 mg Nebulization BID    sodium chloride flush  10 mL Intravenous 2 times per day    dexamethasone  6 mg Oral Daily    Vitamin D  6,000 Units Oral Daily    zinc sulfate  50 mg Oral Daily    remdesivir IVPB  100 mg Intravenous Q24H     Continuous Infusions:    dexmedetomidine (PRECEDEX) IV infusion 0.3 mcg/kg/hr (01/03/21 0850)    sodium chloride       PRN Meds: fleet, sodium chloride flush, ALPRAZolam, calcium carbonate, albuterol, sodium chloride flush, potassium chloride **OR** potassium alternative oral replacement **OR** potassium chloride, magnesium sulfate, promethazine **OR** ondansetron, polyethylene glycol, nicotine, acetaminophen **OR** acetaminophen, sodium chloride, guaiFENesin-dextromethorphan, sodium chloride    Data:     Past Medical History:   has a past medical history of Asthma, COPD (chronic obstructive pulmonary disease) ([facility] [address] and [facility] Social History:   reports that he quit smoking about 53 years ago. He has never used smokeless tobacco. He reports that he does not drink alcohol or use drugs.      Family History:   Family History   Problem Relation Age of Onset    Heart Attack Mother     Heart Attack Father Vitals:  BP (!) 103/54   Pulse 61   Temp 98.3 °F (36.8 °C) (Oral)   Resp 28   Ht 5' 6\" (1.676 m)   Wt 136 lb (61.7 kg)   SpO2 92%   BMI 21.95 kg/m²   Temp (24hrs), Av.5 °F (36.4 °C), Min:96.1 °F (35.6 °C), Max:98.3 °F (36.8 °C)    No results for input(s): POCGLU in the last 72 hours. I/O (24Hr):     Intake/Output Summary (Last 24 hours) at 1/3/2021 1134  Last data filed at 1/3/2021 0700  Gross per 24 hour   Intake 893.78 ml   Output 3160 ml   Net -2266.22 ml       Labs:  Hematology:  Recent Labs     21  05021  0521  0600   WBC 6.0 6.1 6.9   RBC 3.61* 3.70* 3.88*   HGB 10.9* 11.0* 11.6*   HCT 33.9* 34.1* 35.9*   MCV 93.9 92.2 92.5   MCH 30.2 29.7 29.9   MCHC 32.2 32.3 32.3   RDW 14.9* 14.8* 14.8*   * 104* 105*   MPV 11.2 11.2 11.2   .9*  --   --    DDIMER 0.51  --  0.46     Chemistry:  Recent Labs     21  05021  0521  0600   * 134* 133*   K 3.8 3.8 3.7   CL 92* 92* 89*   CO2 37* 37* 39*   GLUCOSE 131* 103* 100*   BUN 14 15 16   CREATININE <0.40* <0.40* <0.40*   ANIONGAP 4* 5* 5*   LABGLOM CANNOT BE CALCULATED CANNOT BE CALCULATED CANNOT BE CALCULATED   GFRAA CANNOT BE CALCULATED CANNOT BE CALCULATED CANNOT BE CALCULATED   CALCIUM 8.4* 8.5* 8.7   TROPHS  --  23*  --      Recent Labs     21  05021  0521  0600   PROT 5.6* 5.8* 5.9*   LABALBU 2.8* 2.9* 2.9*   AST 14 15 16   ALT 12 13 17   ALKPHOS 50 52 55   BILITOT 0.26* 0.27* 0.45     ABG:No results found for: POCPH, PHART, PH, POCPCO2, CWS2AGB, PCO2, POCPO2, PO2ART, PO2, POCHCO3, EDZ0MIY, HCO3, NBEA, PBEA, BEART, BE, THGBART, THB, XAW5XLA, PKUZ9DGA, W1JOCURE, O2SAT, FIO2  Lab Results   Component Value Date/Time    SPECIAL LEFT [facility] 2020 12:45 PM     Lab Results   Component Value Date/Time    CULTURE NO GROWTH 4 DAYS 2020 12:45 PM       Radiology:  Xr Chest Portable    Result Date: 2021 No significant interval change. Diffuse bilateral patchy airspace opacity and interstitial thickening. Xr Chest Portable    Result Date: 12/30/2020  Patchy diffuse bilateral pulmonary opacification most consistent with multifocal pneumonia including atypical viral pneumonia. Physical Examination:        General appearance:  alert, cooperative and no distress  Mental Status:  oriented to person, place and time and normal affect  Lungs:  clear to auscultation bilaterally, normal effort  Heart:  regular rate and rhythm, no murmur  Abdomen:  soft, nontender, nondistended, normal bowel sounds, no masses, hepatomegaly, splenomegaly  Extremities:  no edema, redness, tenderness in the calves  Skin:  no gross lesions, rashes, induration    Assessment:        Hospital Problems           Last Modified POA    * (Principal) Pneumonia due to COVID-19 virus 12/30/2020 Yes    COPD (chronic obstructive pulmonary disease) ([facility] [facility] 12/30/2020 Yes    Dyslipidemia 12/30/2020 Yes    Pulmonary interstitial fibrosis ([facility] [facility] 12/30/2020 Yes    Gastroesophageal reflux disease without esophagitis 12/30/2020 Yes    Former smoker 12/30/2020 Yes    Benign prostatic hyperplasia 12/30/2020 Yes    Anxiety 12/30/2020 Yes    Acute-on-chronic respiratory failure ([address] 12/30/2020 Yes    COVID-19 12/30/2020 Yes    SIRS (systemic inflammatory response syndrome) ([facility] 12/30/2020 Yes          Plan:        1. Initiate remdesivirday 5/5  2. Initiate dexamethasoneday 5/10  3. Type cross and transfuse 1 unit convalescent plasma now scheduled and as needed respiratory therapy as outlined in the orders for pulmonary fibrosis, COPD, Covid  4. Heated high flow O2 as well as adult BiPAP protocol at respiratory discretion  5. Midodrine added due to hypotension  6. Maintain indwelling urinary catheter for BPH with urinary retention as well as Flomax  7. PPI for GERD, Lipitor for dyslipidemia  8.  Anxiety management with home medication as well as Xanax 9. Consult pulmonology  10. Add procalcitonin, blood cultures, trend lactate, trend troponin  11.  Lovenox 30 twice daily for anticoagulation with Saji Brown MD  1/3/2021  11:34 AM

## 2021-01-03 NOTE — PLAN OF CARE
Problem: Breathing Pattern - Ineffective  Goal: Ability to achieve and maintain a regular respiratory rate  1/3/2021 0922 by Dayanara Yee RN  Outcome: Ongoing  1/3/2021 0413 by Caterina Shepherd RN  Outcome: Ongoing  Intervention: Stress management techniques  Note: Respiratory assessment performed and charted. Lungs assessed. Monitored respiratory assessment performed and charted. Monitored for increased WOB and increased respiratory rate. Continuous pulse oximetry. Instructed to call with any c/o respiratory distress. Frequent nursing rounds . Encouraged C&DBing. HOB elevated at 30 degrees. Problem: Nutrition Deficits  Goal: Optimize nutrtional status  Outcome: Ongoing  Intervention: Bioactive substance supplement/functional foods  Note: Patient currently NPO. Writer will discuss with physician during rounds. Problem: Skin Integrity:  Intervention: Manage non-pharmacologic comfort measures  Note: Skin assessment performed and charted. Assessed for areas of redness and or breakdown. Patient instructed on the importance of position changes every 2 hours and PRN for the prevention of skin breakdown. Assessed oral mucosa. Monitored for areas of redness, open areas and thrush. Monitored nutritional intake. Problem: Falls - Risk of:  Goal: Will remain free from falls  Description: Will remain free from falls  Note: Continue fall precautions including arm band identification, falling star placed outside of the room and alarm at night and when unattended. Use of ambulatory aids when indicated and frequent visual checks. Monitored orientation status. Instructed to call with any needs for assistance prior to getting OOB. Fall precautions remain in place. All clutter removed from room. All personal belongings within reach.

## 2021-01-03 NOTE — PROGRESS NOTES
Physical Therapy  DATE: 1/3/2021    NAME: Shannan Christianson  MRN: [id number]   : 1933    Patient not seen this date for Physical Therapy due to:  [] Blood transfusion in progress  [x] Cancel by RN - Per RNElizabeth, pt medically unstable. Will continue to follow. [] Hemodialysis  []  Refusal by Patient   [] Spine Precautions   [] Strict Bedrest  [] Surgery  [] Testing      [] Other        [] PT being discontinued at this time. Patient independent. No further needs. [] PT being discontinued at this time as the patient has been transferred to hospice care. No further needs.     Hortensia Ybarra, PT

## 2021-01-03 NOTE — CARE COORDINATION
Patient is on high flow and BIPAP. Plan is still to return to [facility] upon discharge. Continue to follow.

## 2021-01-03 NOTE — PROGRESS NOTES
Pulmonary Critical Care Progress Note    Patient seen for the follow up of Pneumonia due to COVID-19 virus     Subjective:    He is  Less short of breath on BiPAP 60  % and FiO2  80% on high-flow nasal cannula he still requires Precedex. Denies chest pain. Mild occasional cough, mostly dry. He  Has been NPO. Examination:    Vitals: BP (!) 85/51   Pulse 67   Temp 98 °F (36.7 °C) (Oral)   Resp 23   Ht 5' 6\" (1.676 m)   Wt 136 lb (61.7 kg)   SpO2 98%   BMI 21.95 kg/m²   SpO2  Av.7 %  Min: 86 %  Max: 100 %  General appearance: alert anxious  Neck: No JVD  Lungs: Decreased breath sound no crackles or wheezing  Heart: regular rate and rhythm, S1, S2 normal, no gallop  Abdomen: Soft, non tender, + BS  Extremities: no cyanosis or clubbing. No significant edema    LABs:    CBC:   Recent Labs     2128 21  0600   WBC 6.1 6.9   HGB 11.0* 11.6*   HCT 34.1* 35.9*   * 105*     BMP:   Recent Labs     21  0528 21  0600   * 133*   K 3.8 3.7   CO2 37* 39*   BUN 15 16   CREATININE <0.40* <0.40*   LABGLOM CANNOT BE CALCULATED CANNOT BE CALCULATED   GLUCOSE 103* 100*     PT/INR:   No results for input(s): PROTIME, INR in the last 72 hours. LIVER PROFILE:  Recent Labs     21  0528 21  0600   AST 15 16   ALT 13 17   LABALBU 2.9* 2.9*     Results for Magali Deer (MRN [id number]) as of 2021 13:34   Ref. Range 2020 17:48 2020 04:50   Procalcitonin Latest Ref Range: <0.09 ng/mL 0.16 (H) 0.18 (H)   Results for Magali Bejarano (MRN [id number]) as of 1/3/2021 15:54   Ref. Range 1/3/2021 06:00   D-Dimer, Quant Latest Ref Range: 0.00 - 0.59 mg/L FEU 0.46     Results for Magali Bejarano (MRN [id number]) as of 2021 14:59   Ref. Range 2020 10:53   Pro-BNP Latest Ref Range: <300 pg/mL 842 (H)     Radiology:    Chest x-ray 1/3  The cardiac and mediastinal contours appear unchanged.  Bilateral airspace disease again demonstrated without significant interval change, considering   technique differences.  No new airspace disease identified in the interval.   No evidence for pneumothorax.            Impression:    · Acute  hypoxic respiratory failure  ·   COVID-19  Pneumonia  ·  mild pulmonary edema  ·  history of interstitial lung fibrosis  ·  ex-smoker possible COPD  ·  mild thrombocytopenia      Recommendations:    · oxygen with BiPAP support  ·  high-flow oxygen as tolerated  · Precedex drip  ·  Seroquel 25 b.i.d.  ·   Start thickened liquids as tolerated  ·  speech evaluation later improved FiO2  ·  prone as tolerated  ·  airborne isolation  ·  DuoNeb by nebulizer q.4 hours  ·  Pulmicort 0.5 q.12 hours  ·  Brovana q.12 hours  ·  Mucomyst by nebulizer 3 times daily  ·  Decadron 6 mg IV daily  ·  Solu-Medrol 60 IV x1  ·  remdesivir by ID  ·  check procalcitonin  ·  Solu-Medrol 60 IV x1  ·  Lasix 40 IV  Q 12 hrs /monitor urine output  ·  monitor D-dimer and procalcitonin  ·  echocardiogramn tropnin  ·  discussed with RN RT  ·  DVT prophylaxis with Lovenox /monitor platelets    Michi Mueller MD, [facility]  Pulmonary Critical Care and Sleep Medicine,  [facility]  Cell: [phone]  Office: [phone]  cc35 min

## 2021-01-03 NOTE — PROGRESS NOTES
Occupational [address]  Occupational Therapy Not Seen Note    Patient not available for Occupational Therapy due to:    [] Testing:    [] Hemodialysis    [x] Cancelled by RN: 1/3: Cx per RN Elizabeth, will continue to follow.    []Refusal by Patient:    [] Surgery:     [] Intubation:     [] Pain Medication:    [] Sedation:     [] Spine Precautions :    [] Medical Instability:    [] Other:    Mary Beth Rader OT

## 2021-01-04 ENCOUNTER — APPOINTMENT (OUTPATIENT)
Dept: GENERAL RADIOLOGY | Age: 86
DRG: 871 | End: 2021-01-04
Payer: MEDICARE

## 2021-01-04 LAB
ALBUMIN SERPL-MCNC: 3.2 G/DL (ref 3.5–5.2)
ALBUMIN/GLOBULIN RATIO: ABNORMAL (ref 1–2.5)
ALP BLD-CCNC: 61 U/L (ref 40–129)
ALT SERPL-CCNC: 26 U/L (ref 5–41)
ANION GAP SERPL CALCULATED.3IONS-SCNC: 9 MMOL/L (ref 9–17)
AST SERPL-CCNC: 18 U/L
BILIRUB SERPL-MCNC: 0.36 MG/DL (ref 0.3–1.2)
BUN BLDV-MCNC: 24 MG/DL (ref 8–23)
BUN/CREAT BLD: ABNORMAL (ref 9–20)
C-REACTIVE PROTEIN: 97.3 MG/L (ref 0–5)
CALCIUM SERPL-MCNC: 9 MG/DL (ref 8.6–10.4)
CHLORIDE BLD-SCNC: 87 MMOL/L (ref 98–107)
CO2: 39 MMOL/L (ref 20–31)
CREAT SERPL-MCNC: <0.4 MG/DL (ref 0.7–1.2)
GFR AFRICAN AMERICAN: ABNORMAL ML/MIN
GFR NON-AFRICAN AMERICAN: ABNORMAL ML/MIN
GFR SERPL CREATININE-BSD FRML MDRD: ABNORMAL ML/MIN/{1.73_M2}
GFR SERPL CREATININE-BSD FRML MDRD: ABNORMAL ML/MIN/{1.73_M2}
GLUCOSE BLD-MCNC: 153 MG/DL (ref 70–99)
HCT VFR BLD CALC: 37.1 % (ref 40.7–50.3)
HEMOGLOBIN: 12.1 G/DL (ref 13–17)
LV EF: 45 %
LVEF MODALITY: NORMAL
MCH RBC QN AUTO: 29.8 PG (ref 25.2–33.5)
MCHC RBC AUTO-ENTMCNC: 32.6 G/DL (ref 28.4–34.8)
MCV RBC AUTO: 91.4 FL (ref 82.6–102.9)
NRBC AUTOMATED: 0 PER 100 WBC
PDW BLD-RTO: 14.6 % (ref 11.8–14.4)
PLATELET # BLD: 111 K/UL (ref 138–453)
PMV BLD AUTO: 11 FL (ref 8.1–13.5)
POTASSIUM SERPL-SCNC: 3.6 MMOL/L (ref 3.7–5.3)
PROCALCITONIN: 0.11 NG/ML
RBC # BLD: 4.06 M/UL (ref 4.21–5.77)
SODIUM BLD-SCNC: 135 MMOL/L (ref 135–144)
TOTAL PROTEIN: 6.3 G/DL (ref 6.4–8.3)
WBC # BLD: 5.6 K/UL (ref 3.5–11.3)

## 2021-01-04 PROCEDURE — 84145 PROCALCITONIN (PCT): CPT

## 2021-01-04 PROCEDURE — 6370000000 HC RX 637 (ALT 250 FOR IP): Performed by: INTERNAL MEDICINE

## 2021-01-04 PROCEDURE — 6360000002 HC RX W HCPCS: Performed by: INTERNAL MEDICINE

## 2021-01-04 PROCEDURE — 99232 SBSQ HOSP IP/OBS MODERATE 35: CPT | Performed by: INTERNAL MEDICINE

## 2021-01-04 PROCEDURE — 85027 COMPLETE CBC AUTOMATED: CPT

## 2021-01-04 PROCEDURE — 97110 THERAPEUTIC EXERCISES: CPT

## 2021-01-04 PROCEDURE — 94640 AIRWAY INHALATION TREATMENT: CPT

## 2021-01-04 PROCEDURE — 93306 TTE W/DOPPLER COMPLETE: CPT

## 2021-01-04 PROCEDURE — 2700000000 HC OXYGEN THERAPY PER DAY

## 2021-01-04 PROCEDURE — 97530 THERAPEUTIC ACTIVITIES: CPT

## 2021-01-04 PROCEDURE — 2580000003 HC RX 258: Performed by: INTERNAL MEDICINE

## 2021-01-04 PROCEDURE — 80053 COMPREHEN METABOLIC PANEL: CPT

## 2021-01-04 PROCEDURE — 36415 COLL VENOUS BLD VENIPUNCTURE: CPT

## 2021-01-04 PROCEDURE — 6360000002 HC RX W HCPCS: Performed by: NURSE PRACTITIONER

## 2021-01-04 PROCEDURE — 6370000000 HC RX 637 (ALT 250 FOR IP): Performed by: NURSE PRACTITIONER

## 2021-01-04 PROCEDURE — 2000000000 HC ICU R&B

## 2021-01-04 PROCEDURE — 94761 N-INVAS EAR/PLS OXIMETRY MLT: CPT

## 2021-01-04 PROCEDURE — 2500000003 HC RX 250 WO HCPCS: Performed by: INTERNAL MEDICINE

## 2021-01-04 PROCEDURE — 71045 X-RAY EXAM CHEST 1 VIEW: CPT

## 2021-01-04 RX ORDER — METHYLPREDNISOLONE SODIUM SUCCINATE 125 MG/2ML
60 INJECTION, POWDER, LYOPHILIZED, FOR SOLUTION INTRAMUSCULAR; INTRAVENOUS ONCE
Status: COMPLETED | OUTPATIENT
Start: 2021-01-04 | End: 2021-01-04

## 2021-01-04 RX ORDER — DOCUSATE SODIUM 100 MG/1
100 CAPSULE, LIQUID FILLED ORAL DAILY
COMMUNITY

## 2021-01-04 RX ORDER — SIMETHICONE 80 MG
80 TABLET,CHEWABLE ORAL EVERY 6 HOURS PRN
COMMUNITY

## 2021-01-04 RX ORDER — ONDANSETRON 4 MG/1
4 TABLET, FILM COATED ORAL EVERY 6 HOURS PRN
Status: ON HOLD | COMMUNITY
End: 2021-01-13 | Stop reason: HOSPADM

## 2021-01-04 RX ORDER — ACETAMINOPHEN 325 MG/1
650 TABLET ORAL EVERY 6 HOURS PRN
COMMUNITY

## 2021-01-04 RX ORDER — BUSPIRONE HYDROCHLORIDE 5 MG/1
5 TABLET ORAL 2 TIMES DAILY
Status: ON HOLD | COMMUNITY
End: 2021-01-13 | Stop reason: HOSPADM

## 2021-01-04 RX ORDER — ALBUTEROL SULFATE 90 UG/1
1 AEROSOL, METERED RESPIRATORY (INHALATION) EVERY 4 HOURS PRN
Status: ON HOLD | COMMUNITY
End: 2021-01-13 | Stop reason: HOSPADM

## 2021-01-04 RX ORDER — FUROSEMIDE 10 MG/ML
40 INJECTION INTRAMUSCULAR; INTRAVENOUS DAILY
Status: DISCONTINUED | OUTPATIENT
Start: 2021-01-05 | End: 2021-01-14 | Stop reason: HOSPADM

## 2021-01-04 RX ORDER — PREDNISONE 10 MG/1
10 TABLET ORAL DAILY
Status: ON HOLD | COMMUNITY
Start: 2021-01-02 | End: 2021-01-13 | Stop reason: HOSPADM

## 2021-01-04 RX ORDER — POLYETHYLENE GLYCOL 3350 17 G/17G
17 POWDER, FOR SOLUTION ORAL 2 TIMES DAILY
COMMUNITY

## 2021-01-04 RX ORDER — LATANOPROST 50 UG/ML
1 SOLUTION/ DROPS OPHTHALMIC NIGHTLY
COMMUNITY

## 2021-01-04 RX ADMIN — IPRATROPIUM BROMIDE AND ALBUTEROL SULFATE 1 AMPULE: .5; 3 SOLUTION RESPIRATORY (INHALATION) at 22:51

## 2021-01-04 RX ADMIN — ASPIRIN 81 MG: 81 TABLET, COATED ORAL at 09:11

## 2021-01-04 RX ADMIN — MIDODRINE HYDROCHLORIDE 5 MG: 5 TABLET ORAL at 09:12

## 2021-01-04 RX ADMIN — FAMOTIDINE 40 MG: 20 TABLET, FILM COATED ORAL at 20:35

## 2021-01-04 RX ADMIN — TAMSULOSIN HYDROCHLORIDE 0.4 MG: 0.4 CAPSULE ORAL at 20:35

## 2021-01-04 RX ADMIN — SODIUM CHLORIDE 0.2 MCG/KG/HR: 9 INJECTION, SOLUTION INTRAVENOUS at 20:50

## 2021-01-04 RX ADMIN — FUROSEMIDE 40 MG: 10 INJECTION, SOLUTION INTRAMUSCULAR; INTRAVENOUS at 09:12

## 2021-01-04 RX ADMIN — IPRATROPIUM BROMIDE AND ALBUTEROL SULFATE 1 AMPULE: .5; 3 SOLUTION RESPIRATORY (INHALATION) at 16:22

## 2021-01-04 RX ADMIN — QUETIAPINE FUMARATE 25 MG: 25 TABLET ORAL at 09:13

## 2021-01-04 RX ADMIN — BUDESONIDE 1000 MCG: 0.5 SUSPENSION RESPIRATORY (INHALATION) at 09:10

## 2021-01-04 RX ADMIN — DEXAMETHASONE 6 MG: 4 TABLET ORAL at 09:11

## 2021-01-04 RX ADMIN — METHYLPREDNISOLONE SODIUM SUCCINATE 60 MG: 125 INJECTION, POWDER, FOR SOLUTION INTRAMUSCULAR; INTRAVENOUS at 18:55

## 2021-01-04 RX ADMIN — BUDESONIDE 1000 MCG: 0.5 SUSPENSION RESPIRATORY (INHALATION) at 22:51

## 2021-01-04 RX ADMIN — Medication 50 MG: at 09:13

## 2021-01-04 RX ADMIN — QUETIAPINE FUMARATE 25 MG: 25 TABLET ORAL at 20:35

## 2021-01-04 RX ADMIN — ENOXAPARIN SODIUM 40 MG: 40 INJECTION SUBCUTANEOUS at 09:11

## 2021-01-04 RX ADMIN — IPRATROPIUM BROMIDE AND ALBUTEROL SULFATE 1 AMPULE: .5; 3 SOLUTION RESPIRATORY (INHALATION) at 09:10

## 2021-01-04 RX ADMIN — ACETYLCYSTEINE 600 MG: 200 SOLUTION ORAL; RESPIRATORY (INHALATION) at 09:11

## 2021-01-04 RX ADMIN — MIDODRINE HYDROCHLORIDE 5 MG: 5 TABLET ORAL at 17:00

## 2021-01-04 RX ADMIN — Medication 6000 UNITS: at 09:13

## 2021-01-04 RX ADMIN — ARFORMOTEROL TARTRATE 15 MCG: 15 SOLUTION RESPIRATORY (INHALATION) at 09:10

## 2021-01-04 ASSESSMENT — PAIN SCALES - GENERAL
PAINLEVEL_OUTOF10: 0

## 2021-01-04 NOTE — PROGRESS NOTES
Writer called Blood Bank to check on status of plasma. At this time no plasma available at this time.

## 2021-01-04 NOTE — CARE COORDINATION
BPCI-A Medical Bundle Patient:  Working DRG: sepsis-   Admitting Location: [address] [place] [address] Date: 10/30/2020  Date of Discharge:     Bundle End Date: +++    90-day post-acute outreach and tracking with qualifying DRG.

## 2021-01-04 NOTE — PLAN OF CARE
Nutrition Problem #1: Inadequate protein-energy intake  Intervention: Food and/or Nutrient Delivery: Start Oral Diet, Start Oral Nutrition Supplement  Nutritional Goals: PO intakes are greater than 50% at meals

## 2021-01-04 NOTE — PROGRESS NOTES
Transitions of Care Pharmacy Service   Medication Review    The patient's list of current home medications has been reviewed. Source(s) of information: med list from facility ([facility])      PROVIDER ACTION REQUESTED  Medications that need to be addressed by a physician/nurse practitioner:    Medication Action Requested   Aspirin 81mg,  Flonase nasal   Not current meds per facility -- please discontinue if appropriate    Meds added to list need review/reorder as appropriate (Buspar, Xalatan, Miralax, etc)           Please feel free to call me with any questions about this encounter. Thank you.     Karina Jones, [address] [place] [address]   Transitions of Care Pharmacy Service  Phone:  [phone]  Fax: [phone]      Electronically signed by Karina Jones [address] [place] [address] on 1/4/2021 at 3:10 PM           Medications Prior to Admission:   acetaminophen (TYLENOL) 325 MG tablet, Take 650 mg by mouth every 6 hours as needed for Pain  busPIRone (BUSPAR) 5 MG tablet, Take 5 mg by mouth 2 times daily  docusate sodium (COLACE) 100 MG capsule, Take 100 mg by mouth daily  latanoprost (XALATAN) 0.005 % ophthalmic solution, Place 1 drop into both eyes nightly  polyethylene glycol (GLYCOLAX) 17 g packet, Take 17 g by mouth 2 times daily  ondansetron (ZOFRAN) 4 MG tablet, Take 4 mg by mouth every 6 hours as needed for Nausea or Vomiting  predniSONE (DELTASONE) 10 MG tablet, Take 10 mg by mouth daily Indications: until 1/13/21  albuterol sulfate HFA (VENTOLIN HFA) 108 (90 Base) MCG/ACT inhaler, Inhale 1 puff into the lungs every 4 hours as needed for Wheezing or Shortness of Breath  simethicone (MYLICON) 80 MG chewable tablet, Take 80 mg by mouth every 6 hours as needed (GERD)  tamsulosin (FLOMAX) 0.4 MG capsule, Take 1 capsule by mouth every evening   famotidine (PEPCID) 40 MG tablet, Take 20 mg by mouth 2 times daily   ESBRIET 267 MG CAPS, Take 3 capsules by mouth 3 times daily (with meals) Pt takes 3 with each meal

## 2021-01-04 NOTE — PROGRESS NOTES
Comprehensive Nutrition Assessment    Type and Reason for Visit:  Initial    Nutrition Recommendations/Plan:   1. Start Full liquid diet and mildly thick liquids  2. Continue Magic Cup and [employer] (thickened) 3x/day  3. Monitor p.o intakes, labs, swallowing ability/ diet tolerance and diet progression    Nutrition Assessment:  Patient admission is related to pneumonia due to COVID-19 virus. RN reports physician is concerned for aspiration and wants patient on thickened liquids. Patient is on high flow oxygen and is not able to eat much. Will start Full liquid, mildly (nectar) thick liquids with thickened Ensure Enlive and Magic Cup supplements. Diet will likely continue until patient can be evaluated for swallowing. Monitor diet progression, p.o intakes and labs. Malnutrition Assessment:  Malnutrition Status: At risk for malnutrition (Comment)      Estimated Daily Nutrient Needs:  Energy (kcal):  4760-5835 kcal based on 28-30 kcal/kg; Weight Used for Energy Requirements:  Current     Protein (g):  80-93 gm based on 1.3-1.5 gm/kg; Weight Used for Protein Requirements:  Current          Nutrition Related Findings:  +2 BLE edema. Active bowel sounds.  COVID-19      Wounds:  None       Current Nutrition Therapies:    Dietary Nutrition Supplements: Standard High Calorie Oral Supplement, Frozen Oral Supplement  DIET FULL LIQUID; Mildly Thick (Nectar)    Anthropometric Measures:  · Height: 5' 6\" (167.6 cm)  · Current Body Weight: 136 lb (61.7 kg)   · Ideal Body Weight: 142 lbs; % Ideal Body Weight 95.8 %   · BMI: 22  · BMI Categories: Underweight (BMI less than 22) age over 72       Nutrition Diagnosis:   · Inadequate protein-energy intake related to impaired respiratory function(COVID-19 virus) as evidenced by intake 0-25%(possible swallowing issues)      Nutrition Interventions:   Food and/or Nutrient Delivery:  Start Oral Diet, Start Oral Nutrition Supplement Nutrition Education/Counseling:  Education not indicated   Coordination of Nutrition Care:  Continue to monitor while inpatient    Goals:  PO intakes are greater than 50% at meals       Nutrition Monitoring and Evaluation:   Food/Nutrient Intake Outcomes:  Food and Nutrient Intake, Diet Advancement/Tolerance, Supplement Intake  Physical Signs/Symptoms Outcomes:  Biochemical Data, Fluid Status or Edema, Skin, Weight, Chewing or Swallowing     Discharge Planning:     Too soon to determine           Rai Patch  MFN, RDN, LDN  Lead Clinical Dietitian  RD Office Phone [phone]

## 2021-01-04 NOTE — FLOWSHEET NOTE
Patient in COVID-19 isolation; sleeping; no family present. Writer prays for patient from [facility]. Spiritual Care will follow as needed.      01/04/21 1116   Encounter Summary   Services provided to: Patient not available   Referral/Consult From: Diane   Continue Visiting   (1/4/21 sleeping)   Complexity of Encounter Low   Length of Encounter 15 minutes   Routine   Type Follow up   Assessment Sleeping   Intervention Prayer

## 2021-01-04 NOTE — PROGRESS NOTES
Pulmonary Critical Care Progress Note    Patient seen for the follow up of Pneumonia due to COVID-19 virus     Subjective:    He is  Less short of breath on BiPAP 40 % and FiO2  60% on high-flow nasal cannula he still requires Precedex. Denies chest pain. Mild occasional cough, mostly dry. He  Has tolerated full liquids . He sat in chair for 1 hour     Examination:    Vitals: BP (!) 86/47   Pulse 84   Temp 98 °F (36.7 °C) (Oral)   Resp (!) 34   Ht 5' 6\" (1.676 m)   Wt 136 lb (61.7 kg)   SpO2 95%   BMI 21.95 kg/m²   SpO2  Av.3 %  Min: 83 %  Max: 99 %  General appearance: alert oriented   Neck: No JVD  Lungs: Decreased breath sound no crackles or wheezing  Heart: regular rate and rhythm, S1, S2 normal, no gallop  Abdomen: Soft, non tender, + BS  Extremities: no cyanosis or clubbing. No significant edema    LABs:    CBC:   Recent Labs     21  0600 21  0442   WBC 6.9 5.6   HGB 11.6* 12.1*   HCT 35.9* 37.1*   * 111*     BMP:   Recent Labs     21  0600 21  0442   * 135   K 3.7 3.6*   CO2 39* 39*   BUN 16 24*   CREATININE <0.40* <0.40*   LABGLOM CANNOT BE CALCULATED CANNOT BE CALCULATED   GLUCOSE 100* 153*       Recent Labs     21  0600 21  0442   AST 16 18   ALT 17 26   LABALBU 2.9* 3.2*     Results for aMgdy Melendez (MRN [id number]) as of 2021 16:50   Ref. Range 2021 05:04 1/3/2021 06:00   D-Dimer, Quant Latest Ref Range: 0.00 - 0.59 mg/L FEU 0.51 0.46       Results for Magdy Melendez (MRN [id number]) as of 2021 14:59   Ref. Range 2020 10:53   Pro-BNP Latest Ref Range: <300 pg/mL 842 (H)   Results for Magdy Melendez (MRN [id number]) as of 2021 16:50   Ref.  Range 2021 04:42   Procalcitonin Latest Ref Range: <0.09 ng/mL 0.11 (H)     Radiology:    Chest x-ray     No significant interval change in multifocal bilateral airspace disease as   compared to prior           Impression:    · Acute  hypoxic respiratory failure ·   COVID-19  Pneumonia  ·  mild pulmonary edema/ mild systolic heart failure  ·  history of interstitial lung fibrosis  ·  ex-smoker possible COPD  · Moderate pulmonary hypertension   ·  mild thrombocytopenia      Recommendations:    · oxygen with BiPAP support  ·  high-flow oxygen as tolerated  · Precedex drip  ·  Seroquel 25 b.i.d.  ·   Start thickened liquids as tolerated  ·  speech evaluation later improved FiO2  ·  prone as tolerated  ·  airborne isolation  ·  DuoNeb by nebulizer q.4 hours  ·  Pulmicort 0.5 q.12 hours  ·  Brovana q.12 hours  ·  Mucomyst by nebulizer 3 times daily  ·  Decadron 6 mg IV daily  ·  Solu-Medrol 60 IV x1  ·  remdesivir by ID  ·  check procalcitonin  ·  repeat Solu-Medrol 60 IV x1  ·  make Lasix 40 IV  Q 24 hrs /monitor urine output  ·  monitor D-dimer and procalcitonin  ·  discussed with RN RT  ·  DVT prophylaxis with Lovenox /monitor platelets    Melissa Giron MD, [facility]  Pulmonary Critical Care and Sleep Medicine,  [facility]  Cell: [phone]  Office: [phone]

## 2021-01-04 NOTE — PROGRESS NOTES
Occupational [address]  Occupational Therapy Not Seen Note    Patient not available for Occupational Therapy due to:    [] Testing:    [] Hemodialysis    [] Cancelled by RN:    []Refusal by Patient:    [] Surgery:     [] Intubation:     [] Pain Medication:    [] Sedation:     [] Spine Precautions :    [] Medical Instability:    [x] Other: Cx; pt worked with PT prior to arrival; pt's SpO2 82-84% resting seated up in chair, pt pulling out high flow.  Will continue to follow    Jeanna Marsh OT

## 2021-01-04 NOTE — PLAN OF CARE
Problem: Breathing Pattern - Ineffective  Goal: Ability to achieve and maintain a regular respiratory rate  1/4/2021 0927 by Deepti Ulloa RN  Outcome: Ongoing  1/4/2021 0159 by Eitan Devine RN  Outcome: Ongoing  Intervention: Stress management techniques  Note: Respiratory assessment performed and charted. Lungs assessed. Monitored respiratory rate, rhythm and pattern. HOB elevated to 30 degrees. Monitored for any s/sx of respiratory distress or increased WOB. Encouraged C&DBing and use of IS. Continuous pulse oximetry. Precedex maintained for managing anxiety. Problem: Nutrition Deficits  Goal: Optimize nutrtional status  Intervention: Bioactive substance supplement/functional foods  Note: Patient on thickened liquids and receiving protein shakes. Patient not on a specific diet at this time. Problem: Skin Integrity:  Intervention: Manage non-pharmacologic comfort measures  Note: Skin care assessment performed and charted. Assessed for areas of redness and or breakdown. Avni scale order set in place. Waffle mattress present. Instructed on the importance of position changes every 2 hours and PRN for the prevention of skin breakdown. Assessed oral mucosa. Monitored dentition, open areas and thrush. Nutritional status monitored.       Problem: Falls - Risk of:  Goal: Absence of physical injury  Description: Absence of physical injury  1/4/2021 0159 by Eitan Devine RN  Outcome: Ongoing     Problem: Falls - Risk of:  Goal: Will remain free from falls  Description: Will remain free from falls  1/4/2021 0927 by Deepti Ulloa RN  Outcome: Ongoing Note: Continue fall precautions including arm band identification, falling star placed outside of the room and alarm at night and when unattended. Use of ambulatory aids when indicated and frequent visual checks. Monitored orientation status. Call light within reach at all times. Bed remains in lowest locked position. All clutter removed from room. All personal belongings within reach. Instructed to call for assistance prior to getting OOB. Fall precautions remain in place.    1/4/2021 0159 by Sheryle Do, RN  Outcome: Ongoing

## 2021-01-04 NOTE — PROGRESS NOTES
Dr. Segovia Seek informed that Nephrology, Dr.N. Rasta Smith is requesting a Hematology/ Oncology consult.

## 2021-01-04 NOTE — PROGRESS NOTES
[facility]  Office: [address], DO, Jennifer [place], DO, Juan Christian, DO, Juana Rodríguez, DO, Haydee Ruth MD, Marian Chilel MD, Lois Castillo MD, Olivia Duong MD, Shahrzad Mckinney MD, Jessica De Leon MD, Gianluca Morales MD, Leonel Sanchez MD, Anuel Ford MD, Melvin Lopez, DO, Jennifer Zeng MD, Lindsay Anderson MD, Nidia Rodriguez, DO, Christie Reich MD,  Tae Wheatley, DO, Do Stewart MD, Darryle Lew, MD, Samia Stone, [facility], [address], [facility], Margie Thao, CNP, Delicia Wang, CNS, Chyna Lara, CNP, Evelin Calzada, CNP, Avel Newell, CNP, Jyoti aNir, CNP, Itzel Flores, CNP, Shoaib Menard PA-C, Javid Canales, [facility], Melita Holly, CNP, Gail Serrano, CNP, Oli Giles, CNP, Jonathon Schilling, [facility], Kelsy Estrella, [facility]    Progress Note    1/4/2021    12:46 PM    Name:   Asia Quintero  MRN:     [id number]     Acct:      [de-identified]   Room:   [address] [place] [address] Day:  5  Admit Date:  12/30/2020 10:47 AM    PCP:   Husam Zazueta MD  Code Status:  DNR-CCA    Subjective:     C/C:   Chief Complaint   Patient presents with    Shortness of Breath     pt states gradually gotten worse over the past few months     Interval History Status: improved. Patient was seen and evaluated at bedside this morning. Patient continues to be on BiPAP. Patient denies any worsening complaints.       Brief History: Patient is a resident at assisted living facility. Patient reports that the facility has recently had an outbreak of Covid. Patient reports over the past 3 days he has been experiencing exertional fatigue followed rapidly by a persistent cough with exertional dyspnea over the past 24 hours. Patient reports that the symptoms are present in between his ADLs and he has been having increasing difficulty with breathing throughout the day. Patient reports to the freestanding emergency department by EMS where he was found to be profoundly hypoxic and was started on supplemental oxygen by nonrebreather mask at 15 L. Initial lab testing was completed and finds patient is Covid positive. Patient is admitted to the hospital for acute respiratory failure with hypoxia and will be treated aggressively with standard Covid therapy. Review of Systems:     Constitutional:  negative for chills, fevers, sweats  Respiratory:  negative for cough, dyspnea on exertion, shortness of breath, wheezing  Cardiovascular:  negative for chest pain, chest pressure/discomfort, lower extremity edema, palpitations  Gastrointestinal:  negative for abdominal pain, constipation, diarrhea, nausea, vomiting  Neurological:  negative for dizziness, headache    Medications: Allergies:     Allergies   Allergen Reactions    Bactrim     Other      Sweet potatoes     Prednisone Other (See Comments)     Trouble voiding       Current Meds:   Scheduled Meds:    QUEtiapine  25 mg Oral BID    midodrine  5 mg Oral TID WC    sodium chloride flush  10 mL Intravenous 2 times per day    furosemide  40 mg Intravenous BID    ipratropium-albuterol  1 ampule Inhalation Q4H WA    enoxaparin  40 mg Subcutaneous Daily    acetylcysteine  600 mg Oral TID    aspirin  81 mg Oral Daily    Pirfenidone  3 capsule Oral TID WC    famotidine  40 mg Oral Daily    fluticasone  2 spray Each Nostril Daily    tamsulosin  0.4 mg Oral Daily  Arformoterol Tartrate  15 mcg Nebulization BID    budesonide  1 mg Nebulization BID    sodium chloride flush  10 mL Intravenous 2 times per day    dexamethasone  6 mg Oral Daily    Vitamin D  6,000 Units Oral Daily    zinc sulfate  50 mg Oral Daily     Continuous Infusions:    dexmedetomidine (PRECEDEX) IV infusion 0.2 mcg/kg/hr (21)    sodium chloride       PRN Meds: polyethylene glycol, sodium chloride flush, ALPRAZolam, calcium carbonate, albuterol, sodium chloride flush, potassium chloride **OR** potassium alternative oral replacement **OR** potassium chloride, magnesium sulfate, promethazine **OR** ondansetron, nicotine, acetaminophen **OR** acetaminophen, sodium chloride, guaiFENesin-dextromethorphan, sodium chloride    Data:     Past Medical History:   has a past medical history of Asthma, COPD (chronic obstructive pulmonary disease) ([facility] [address] and Pulmonary fibrosis ([facility] [address] [facility] Social History:   reports that he quit smoking about 53 years ago. He has never used smokeless tobacco. He reports that he does not drink alcohol or use drugs. Family History:   Family History   Problem Relation Age of Onset    Heart Attack Mother     Heart Attack Father        Vitals:  BP (!) 84/49   Pulse 60   Temp 97.6 °F (36.4 °C) (Oral)   Resp 26   Ht 5' 6\" (1.676 m)   Wt 136 lb (61.7 kg)   SpO2 95%   BMI 21.95 kg/m²   Temp (24hrs), Av °F (36.7 °C), Min:97.6 °F (36.4 °C), Max:98.4 °F (36.9 °C)    No results for input(s): POCGLU in the last 72 hours. I/O (24Hr):     Intake/Output Summary (Last 24 hours) at 2021 1246  Last data filed at 2021 0612  Gross per 24 hour   Intake 558.4 ml   Output 2475 ml   Net -1916.6 ml       Labs:  Hematology:  Recent Labs     21  0528 21  0600 21  0442   WBC 6.1 6.9 5.6   RBC 3.70* 3.88* 4.06*   HGB 11.0* 11.6* 12.1*   HCT 34.1* 35.9* 37.1*   MCV 92.2 92.5 91.4   MCH 29.7 29.9 29.8   MCHC 32.3 32.3 32.6   RDW 14.8* 14.8* 14.6* * 105* 111*   MPV 11.2 11.2 11.0   CRP  --  97.3*  --    DDIMER  --  0.46  --      Chemistry:  Recent Labs     01/02/21 0528 01/03/21 0600 01/04/21 0442   * 133* 135   K 3.8 3.7 3.6*   CL 92* 89* 87*   CO2 37* 39* 39*   GLUCOSE 103* 100* 153*   BUN 15 16 24*   CREATININE <0.40* <0.40* <0.40*   ANIONGAP 5* 5* 9   LABGLOM CANNOT BE CALCULATED CANNOT BE CALCULATED CANNOT BE CALCULATED   GFRAA CANNOT BE CALCULATED CANNOT BE CALCULATED CANNOT BE CALCULATED   CALCIUM 8.5* 8.7 9.0   TROPHS 23*  --   --      Recent Labs     01/02/21 0528 01/03/21 0600 01/04/21 0442   PROT 5.8* 5.9* 6.3*   LABALBU 2.9* 2.9* 3.2*   AST 15 16 18   ALT 13 17 26   ALKPHOS 52 55 61   BILITOT 0.27* 0.45 0.36     ABG:No results found for: POCPH, PHART, PH, POCPCO2, VKY6VKR, PCO2, POCPO2, PO2ART, PO2, POCHCO3, SJV2AZZ, HCO3, NBEA, PBEA, BEART, BE, THGBART, THB, NQC4KVJ, VDAO3IFS, A6WFYYWI, O2SAT, FIO2  Lab Results   Component Value Date/Time    [facility] 12/30/2020 12:45 PM     Lab Results   Component Value Date/Time    CULTURE NO GROWTH 5 DAYS 12/30/2020 12:45 PM       Radiology:  Xr Chest Portable    Result Date: 1/1/2021  No significant interval change. Diffuse bilateral patchy airspace opacity and interstitial thickening. Xr Chest Portable    Result Date: 12/30/2020  Patchy diffuse bilateral pulmonary opacification most consistent with multifocal pneumonia including atypical viral pneumonia.        Physical Examination:        General appearance:  alert, cooperative and no distress  Mental Status:  oriented to person, place and time and normal affect  Lungs:  clear to auscultation bilaterally, normal effort  Heart:  regular rate and rhythm, no murmur  Abdomen:  soft, nontender, nondistended, normal bowel sounds, no masses, hepatomegaly, splenomegaly  Extremities:  no edema, redness, tenderness in the calves  Skin:  no gross lesions, rashes, induration    Assessment:        Hospital Problems Last Modified POA    * (Principal) Pneumonia due to COVID-19 virus 12/30/2020 Yes    COPD (chronic obstructive pulmonary disease) ([address] 12/30/2020 Yes    Dyslipidemia 12/30/2020 Yes    Pulmonary interstitial fibrosis ([address] 12/30/2020 Yes    Gastroesophageal reflux disease without esophagitis 12/30/2020 Yes    Former smoker 12/30/2020 Yes    Benign prostatic hyperplasia 12/30/2020 Yes    Anxiety 12/30/2020 Yes    Acute-on-chronic respiratory failure ([address] 12/30/2020 Yes    COVID-19 12/30/2020 Yes    SIRS (systemic inflammatory response syndrome) ([address] 12/30/2020 Yes          Plan:        1. Status post remdesivir  2. Continue dexamethasoneday 6/10  3. Type cross and transfuse 1 unit convalescent plasma now scheduled and as needed respiratory therapy as outlined in the orders for pulmonary fibrosis, COPD, Covid  4. Heated high flow O2 as well as adult BiPAP protocol at respiratory discretion  5. Midodrine added due to hypotension  6. Maintain indwelling urinary catheter for BPH with urinary retention as well as Flomax  7. PPI for GERD, Lipitor for dyslipidemia  8. Anxiety management with home medication as well as Xanax  9. Consult pulmonology  10. Add procalcitonin, blood cultures, trend lactate, trend troponin  11.  Lovenox 30 twice daily for anticoagulation with Kylie Laurent MD  1/4/2021  12:46 PM

## 2021-01-05 LAB
C-REACTIVE PROTEIN: 40.7 MG/L (ref 0–5)
CULTURE: NORMAL
CULTURE: NORMAL
D-DIMER QUANTITATIVE: 0.65 MG/L FEU (ref 0–0.59)
Lab: NORMAL
Lab: NORMAL
SPECIMEN DESCRIPTION: NORMAL
SPECIMEN DESCRIPTION: NORMAL

## 2021-01-05 PROCEDURE — 6360000002 HC RX W HCPCS: Performed by: INTERNAL MEDICINE

## 2021-01-05 PROCEDURE — 2000000000 HC ICU R&B

## 2021-01-05 PROCEDURE — 97535 SELF CARE MNGMENT TRAINING: CPT

## 2021-01-05 PROCEDURE — 94761 N-INVAS EAR/PLS OXIMETRY MLT: CPT

## 2021-01-05 PROCEDURE — 6360000002 HC RX W HCPCS: Performed by: NURSE PRACTITIONER

## 2021-01-05 PROCEDURE — 97530 THERAPEUTIC ACTIVITIES: CPT

## 2021-01-05 PROCEDURE — 97116 GAIT TRAINING THERAPY: CPT

## 2021-01-05 PROCEDURE — 85379 FIBRIN DEGRADATION QUANT: CPT

## 2021-01-05 PROCEDURE — 6370000000 HC RX 637 (ALT 250 FOR IP): Performed by: NURSE PRACTITIONER

## 2021-01-05 PROCEDURE — 94640 AIRWAY INHALATION TREATMENT: CPT

## 2021-01-05 PROCEDURE — 6370000000 HC RX 637 (ALT 250 FOR IP): Performed by: INTERNAL MEDICINE

## 2021-01-05 PROCEDURE — 86140 C-REACTIVE PROTEIN: CPT

## 2021-01-05 PROCEDURE — 99232 SBSQ HOSP IP/OBS MODERATE 35: CPT | Performed by: INTERNAL MEDICINE

## 2021-01-05 PROCEDURE — 2700000000 HC OXYGEN THERAPY PER DAY

## 2021-01-05 PROCEDURE — 36415 COLL VENOUS BLD VENIPUNCTURE: CPT

## 2021-01-05 PROCEDURE — 97167 OT EVAL HIGH COMPLEX 60 MIN: CPT

## 2021-01-05 PROCEDURE — 2580000003 HC RX 258: Performed by: INTERNAL MEDICINE

## 2021-01-05 RX ORDER — METHYLPREDNISOLONE SODIUM SUCCINATE 125 MG/2ML
60 INJECTION, POWDER, LYOPHILIZED, FOR SOLUTION INTRAMUSCULAR; INTRAVENOUS ONCE
Status: COMPLETED | OUTPATIENT
Start: 2021-01-05 | End: 2021-01-05

## 2021-01-05 RX ORDER — QUETIAPINE FUMARATE 25 MG/1
50 TABLET, FILM COATED ORAL 2 TIMES DAILY
Status: DISCONTINUED | OUTPATIENT
Start: 2021-01-05 | End: 2021-01-14 | Stop reason: HOSPADM

## 2021-01-05 RX ORDER — ACETYLCYSTEINE 200 MG/ML
600 SOLUTION ORAL; RESPIRATORY (INHALATION) 3 TIMES DAILY
Status: DISCONTINUED | OUTPATIENT
Start: 2021-01-05 | End: 2021-01-06

## 2021-01-05 RX ADMIN — ARFORMOTEROL TARTRATE 15 MCG: 15 SOLUTION RESPIRATORY (INHALATION) at 17:58

## 2021-01-05 RX ADMIN — Medication 6000 UNITS: at 10:02

## 2021-01-05 RX ADMIN — BUDESONIDE 1000 MCG: 0.5 SUSPENSION RESPIRATORY (INHALATION) at 17:59

## 2021-01-05 RX ADMIN — QUETIAPINE FUMARATE 25 MG: 25 TABLET ORAL at 09:40

## 2021-01-05 RX ADMIN — Medication 6000 UNITS: at 09:51

## 2021-01-05 RX ADMIN — ACETYLCYSTEINE 600 MG: 200 SOLUTION ORAL; RESPIRATORY (INHALATION) at 08:10

## 2021-01-05 RX ADMIN — FUROSEMIDE 40 MG: 10 INJECTION, SOLUTION INTRAMUSCULAR; INTRAVENOUS at 09:36

## 2021-01-05 RX ADMIN — Medication 50 MG: at 09:39

## 2021-01-05 RX ADMIN — IPRATROPIUM BROMIDE AND ALBUTEROL SULFATE 1 AMPULE: .5; 3 SOLUTION RESPIRATORY (INHALATION) at 08:10

## 2021-01-05 RX ADMIN — POLYETHYLENE GLYCOL 3350 17 G: 17 POWDER, FOR SOLUTION ORAL at 10:01

## 2021-01-05 RX ADMIN — MIDODRINE HYDROCHLORIDE 5 MG: 5 TABLET ORAL at 09:37

## 2021-01-05 RX ADMIN — ANTACID TABLETS 500 MG: 500 TABLET, CHEWABLE ORAL at 10:01

## 2021-01-05 RX ADMIN — IPRATROPIUM BROMIDE AND ALBUTEROL SULFATE 1 AMPULE: .5; 3 SOLUTION RESPIRATORY (INHALATION) at 14:23

## 2021-01-05 RX ADMIN — SODIUM CHLORIDE, PRESERVATIVE FREE 10 ML: 5 INJECTION INTRAVENOUS at 20:42

## 2021-01-05 RX ADMIN — MIDODRINE HYDROCHLORIDE 5 MG: 5 TABLET ORAL at 18:08

## 2021-01-05 RX ADMIN — ACETYLCYSTEINE 600 MG: 200 SOLUTION ORAL; RESPIRATORY (INHALATION) at 14:26

## 2021-01-05 RX ADMIN — ASPIRIN 81 MG: 81 TABLET, COATED ORAL at 09:35

## 2021-01-05 RX ADMIN — SODIUM CHLORIDE, PRESERVATIVE FREE 10 ML: 5 INJECTION INTRAVENOUS at 09:38

## 2021-01-05 RX ADMIN — METHYLPREDNISOLONE SODIUM SUCCINATE 60 MG: 125 INJECTION, POWDER, FOR SOLUTION INTRAMUSCULAR; INTRAVENOUS at 18:07

## 2021-01-05 RX ADMIN — ACETYLCYSTEINE 600 MG: 200 SOLUTION ORAL; RESPIRATORY (INHALATION) at 18:00

## 2021-01-05 RX ADMIN — FLUTICASONE PROPIONATE 2 SPRAY: 50 SPRAY, METERED NASAL at 09:36

## 2021-01-05 RX ADMIN — IPRATROPIUM BROMIDE AND ALBUTEROL SULFATE 1 AMPULE: .5; 3 SOLUTION RESPIRATORY (INHALATION) at 17:58

## 2021-01-05 RX ADMIN — ENOXAPARIN SODIUM 40 MG: 40 INJECTION SUBCUTANEOUS at 09:36

## 2021-01-05 RX ADMIN — TAMSULOSIN HYDROCHLORIDE 0.4 MG: 0.4 CAPSULE ORAL at 20:42

## 2021-01-05 RX ADMIN — MIDODRINE HYDROCHLORIDE 5 MG: 5 TABLET ORAL at 12:32

## 2021-01-05 RX ADMIN — DEXAMETHASONE 6 MG: 4 TABLET ORAL at 09:36

## 2021-01-05 RX ADMIN — QUETIAPINE FUMARATE 50 MG: 25 TABLET ORAL at 20:42

## 2021-01-05 RX ADMIN — FAMOTIDINE 40 MG: 20 TABLET, FILM COATED ORAL at 20:42

## 2021-01-05 RX ADMIN — ARFORMOTEROL TARTRATE 15 MCG: 15 SOLUTION RESPIRATORY (INHALATION) at 08:10

## 2021-01-05 RX ADMIN — BUDESONIDE 1000 MCG: 0.5 SUSPENSION RESPIRATORY (INHALATION) at 08:10

## 2021-01-05 ASSESSMENT — PAIN SCALES - GENERAL
PAINLEVEL_OUTOF10: 0

## 2021-01-05 NOTE — PROGRESS NOTES
Physical Therapy  Facility/Department: [facility] ICU  Daily Treatment Note  NAME: Inés Lobo  : 1933  MRN: [id number]    Date of Service: 2021    Discharge Recommendations:  Subacute/Skilled Nursing Facility        Assessment      Activity Tolerance  Activity Tolerance: Patient limited by endurance    saO2 86-93% on hi flow    Patient Diagnosis(es): The primary encounter diagnosis was COVID-19. Diagnoses of Pulmonary fibrosis ([facility] [address] and Pneumonia due to organism were also pertinent to this visit. has a past medical history of Asthma, COPD (chronic obstructive pulmonary disease) ([facility] [address] and Pulmonary fibrosis ([facility] [address] has a past surgical history that includes pre-malignant / benign skin lesion excision (2017). Restrictions  Restrictions/Precautions  Restrictions/Precautions: General Precautions, Contact Precautions  Required Braces or Orthoses?: No  Position Activity Restriction  Other position/activity restrictions: droplet + for +covid-19, DNR-CCA, telemetry, up with assist, hi flow, continuous pulse ox & wired telemetry, LUE IV  Subjective   General  Chart Reviewed: Yes  Response To Previous Treatment: Not applicable  Family / Caregiver Present: No  Subjective  Subjective: Pt agreeable to PT  General Comment  Comments: RN sourav PT, states Dr agustins pt up in chair  Pain Screening  Patient Currently in Pain: Denies  Vital Signs  BP Location: Left upper arm  Level of Consciousness: Alert (0)  Patient Currently in Pain: Denies       Orientation  Orientation  Overall Orientation Status: Within Normal Limits  Cognition      Objective   Bed mobility  Rolling to Left: Moderate assistance  Supine to Sit: Moderate assistance;2 Person assistance  Sit to Supine: (Pt request to sit in bedside chair)  Scooting:  Moderate assistance;2 Person assistance Comment: Mod verbal instruction/tactile assist for UE hand placement on rail and proper rolling technique + support of LE's to come in/OOB & pursed lip breathing to increase control of breathing with increased time needed + extra assist for MULTIPLE lines  Pt sat at EOB for 6 minutes to rest after bed mobility & prepare lines for standing & ambulation. Transfers  Sit to Stand: Moderate Assistance;2 Person Assistance  Stand to sit: Moderate Assistance;2 Person Assistance  Bed to Chair: Moderate assistance;2 Person Assistance  Stand Pivot Transfers: Moderate Assistance;2 Person Assistance  Lateral Transfers: Moderate Assistance;2 Person Assistance  Comment: Ed + tactile assist on correct use of upper body for safe sit/stand, 2nd assist needed for safety & MULTIPLE Lines  Ambulation  Ambulation?: Yes  Ambulation 1  Surface: level tile  Device: Rolling Walker  Other Apparatus: O2(Hi flow)  Assistance: Moderate assistance;2 Person assistance  Quality of Gait: short shuffle steps  Distance: 3 ft along EOB & then turn to chair   Stairs/Curb  Stairs?: No     Balance  Posture: Fair  Sitting - Static: Good  Sitting - Dynamic: Good;-  Standing - Static: Fair;+(R/W)  Standing - Dynamic: Fair(R/W)  Exercises  Comments: Ed optimal breathing techniques, LE ex's with optimal breathing, posture & positioning for optimal breathing        All lines intact, call light within reach, and patient positioned comfortably at end of treatment. All patient needs addressed prior to ending therapy session.                     G-Code     OutComes Score                                                     AM-PAC Score  AM-PAC Inpatient Mobility Raw Score : 12 (01/04/21 1235)  AM-PAC Inpatient T-Scale Score : 35.33 (01/04/21 1235)  Mobility Inpatient CMS 0-100% Score: 68.66 (01/04/21 1235)  Mobility Inpatient CMS G-Code Modifier : CL (01/04/21 1235)          Goals  Short term goals  Time Frame for Short term goals: 12 treatments Short term goal 1: Inc bed-mobility & transfers to independent to enable pt to safely get in/OOB  Short term goal 2: Inc gait to amb 150ft or > indep w/ RW to enable pt to return to previous level of independence  Short term goal 3: Inc strength to [address] standing balance to good with device to facilitate pt independence for performance of ADL's & functional mobility, & reduce fall risk; Short term goal 4: Pt able to tolerate 30-40 min of activity to include 15-20 reps of ex & functional mobility including 5 minutes of standing to facilitate activity tolerance to [facility]; Short term goal 5: Ed use of incentive spirometer & optimal breathing techniques, ex's with incorporation of breathing techniques, posture & positioning for optimal breathing &  mental training ex's & issue written pt education  Patient Goals   Patient goals : Back to Rehab then to [facility].     Plan    Plan  Times per week: 1-2x/day,6-7 days/week  Current Treatment Recommendations: Strengthening, Balance Training, Endurance Training, Gait Training, Functional Mobility Training, Transfer Training, Stair training  Safety Devices  Type of devices: Nurse notified, Call light within reach, Left in bed  Restraints  Initially in place: No     Therapy Time   Individual Concurrent Group Co-treatment   Time In  1155         Time Out  1235         Minutes  Cathi Whiting PT

## 2021-01-05 NOTE — PROGRESS NOTES
Occupational Therapy   Occupational Therapy Initial Assessment  Date: 2021   Patient Name: Ivanna Abarca  MRN: [id number]     : 1933    Date of Service: 2021     RN Sherita Pérez reports patient is medically stable for therapy treatment this date. Chart reviewed prior to treatment and patient is agreeable for therapy. All lines intact and patient positioned comfortably at end of treatment. All patient needs addressed prior to ending therapy session. Discharge Recommendations:  Subacute/Skilled Nursing Facility  OT Equipment Recommendations  Equipment Needed: Yes  Mobility Devices: ADL Assistive Devices  ADL Assistive Devices: Sock-Aid Soft;Reacher; Toileting - 3-in-1 Commode;Long-handled Sponge;Emergency Alert System;Grab Bars - shower;Grab Bars - toilet;Long-handled [place]    Assessment   Performance deficits / Impairments: Decreased functional mobility ; Decreased ADL status; Decreased strength;Decreased safe awareness;Decreased cognition;Decreased endurance;Decreased balance;Decreased high-level IADLs;Decreased fine motor control;Decreased coordination;Decreased posture  Assessment: Pt would benefit from continued skilled OT services to Maximize I and safety during functional task to return home at [facility] with assist.  Prognosis: Fair  Decision Making: High Complexity  OT Education: OT Role;Plan of Care;ADL Adaptive Strategies;Transfer Training;Energy Conservation  Patient Education: safety in function, fall prevention/call light use, benefits of being OOB, pursed lip breathing/relaxation tech, recommedations for continued therapy. Barriers to Learning: cognitive deficits  REQUIRES OT FOLLOW UP: Yes  Activity Tolerance  Activity Tolerance: Patient Tolerated treatment well;Patient limited by fatigue  Activity Tolerance: poor plus  Safety Devices  Safety Devices in place: Yes  Type of devices: Call light within reach;Gait belt; All fall risk precautions in place; Left in chair;Nurse notified Patient Diagnosis(es): The primary encounter diagnosis was COVID-19. Diagnoses of Pulmonary fibrosis ([facility] [address] and Pneumonia due to organism were also pertinent to this visit. has a past medical history of Asthma, COPD (chronic obstructive pulmonary disease) ([facility] [address] and Pulmonary fibrosis ([facility] [address] has a past surgical history that includes pre-malignant / benign skin lesion excision (09/12/2017). Per H&P: Pita Hernandez is a 80 y.o. Non-/non  male who presents with Shortness of Breath (pt states gradually gotten worse over the past few months)   and is admitted to the hospital for the management of Pneumonia due to COVID-19 virus.     Patient is a resident at assisted living facility. Patient reports that the facility has recently had an outbreak of Covid. Patient reports over the past 3 days he has been experiencing exertional fatigue followed rapidly by a persistent cough with exertional dyspnea over the past 24 hours. Patient reports that the symptoms are present in between his ADLs and he has been having increasing difficulty with breathing throughout the day. Patient reports to the freestanding emergency department by EMS where he was found to be profoundly hypoxic and was started on supplemental oxygen by nonrebreather mask at 15 L. Initial lab testing was completed and finds patient is Covid positive. Patient is admitted to the hospital for acute respiratory failure with hypoxia and will be treated aggressively with standard Covid therapy.       Restrictions  Restrictions/Precautions  Restrictions/Precautions: General Precautions, Contact Precautions  Required Braces or Orthoses?: No  Position Activity Restriction  Other position/activity restrictions: droplet + for +covid-19, DNR-CCA, telemetry, up with assist, hi flow, continuous pulse ox & wired telemetry, LUE IV    Subjective   General  Chart Reviewed: Yes  Patient assessed for rehabilitation services?: Yes Toilet Transfers  Toilet - Technique: Ambulating(with RW)  Equipment Used: Standard bedside commode  Toilet Transfer: Moderate assistance;2 Person assistance  Toilet Transfers Comments: Pt required Mod verbal cues/tactile assist for RW safety, hand placement on RW, upright posture, pursed lip breathing, nose over toes, weight shifting, reaching back to surface prior to sitting, squaring self/AD up to surface prior to sitting and awareness/assist with lines all to increase safety and reduce falls. ADL  Feeding: Setup;Minimal assistance  Grooming: Setup;Minimal assistance(seated)  UE Bathing: Maximum assistance; Set up  LE Bathing: Dependent/Total  UE Dressing: Maximum assistance; Set up  LE Dressing: Dependent/Total(to don B socks supine in bed)  Toileting: Dependent/Total  Additional Comments: Pt is DEP with all ADLs at this time d/t x2 staff assist when up. Tone RUE  RUE Tone: Normotonic  Tone LUE  LUE Tone: Normotonic  Coordination  Movements Are Fluid And Coordinated: No(Pt has slight tremor and slowed and delayed opposition in B hands)  Coordination and Movement description: Fine motor impairments;Tremors     Bed mobility  Supine to Sit: Moderate assistance;2 Person assistance  Sit to Supine: Unable to assess(Pt request to sit in bedside chair)  Scooting: Moderate assistance;2 Person assistance  Comment: Pt required Mod verbal cues/tactile assist for initiation of bed mobility, hand placement on bed rail, proper bed mobility tech and awareness/assist with lines all to increase safety. Transfers  Stand Step Transfers: 2 Person assistance; Moderate assistance(with RW)  Sit to stand:  Moderate assistance;2 Person assistance  Stand to sit: Moderate assistance;2 Person assistance Transfer Comments: Pt required Mod verbal cues/tactile assist for RW safety, hand placement on RW, upright posture, initiation of task, pursed lip breathing, nose over toes, weight shifting and awareness/assist with lines all to increase safety. Cognition  Overall Cognitive Status: Exceptions  Arousal/Alertness: Appropriate responses to stimuli  Following Commands:  Follows multistep commands with repitition  Attention Span: Appears intact  Memory: Appears intact  Safety Judgement: Decreased awareness of need for safety;Decreased awareness of need for assistance  Problem Solving: Decreased awareness of errors;Assistance required to correct errors made;Assistance required to implement solutions;Assistance required to identify errors made  Insights: Decreased awareness of deficits  Initiation: Requires cues for some  Sequencing: Requires cues for some  Perception  Overall Perceptual Status: WFL     Sensation  Overall Sensation Status: WNL        LUE AROM (degrees)  LUE AROM : WFL  RUE AROM (degrees)  RUE AROM : WFL  LUE Strength  Gross LUE Strength: Exceptions to [facility]  LUE Strength Comment: grossly 3+/5  RUE Strength  Gross RUE Strength: Exceptions to [facility]  RUE Strength Comment: Grossly 3+/5                   Plan   Plan  Times per week: 4-5x/wk 1x/day as hua  Current Treatment Recommendations: Strengthening, Balance Training, Functional Mobility Training, Endurance Training, Equipment Evaluation, Education, & procurement, Patient/Caregiver Education & Training, Safety Education & Training, Self-Care / ADL      AM-PAC Score        AM-[facility] Inpatient Daily Activity Raw Score: 10 (01/05/21 1422)  AM-PAC Inpatient ADL T-Scale Score : 27.31 (01/05/21 1422)  ADL Inpatient CMS 0-100% Score: 74.7 (01/05/21 1422)  ADL Inpatient CMS G-Code Modifier : CL (01/05/21 1422)    Goals  Short term goals  Time Frame for Short term goals: By discharge, pt to demo Short term goal 1: bed mobility to Min A of 1 with use of bed rail as needed. Short term goal 2: UB ADLs to SBA/LB ADLs to Min A with use of AD/AE as needed. Short term goal 3: increased BUE strength by 1/2 grade/I with simple BUE HEP to assist with self care tasks with use of handouts as needed  Short term goal 4: ADL transfers and functional mobility to Min A of 1 with use of AD as needed. Short term goal 5: toileting to Min A with use of AD/grab bars as needed. Long term goals  Long term goal 1: Pt to demo increased standing hua > 5 min with Min A using AD as needed to reduce fall risk during self care tasks. Long term goal 2: Pt/caregivers to be I with fall prevention/ EC/WS, recommendations for AE/DME and pressure relief with use of handouts as needed. Patient Goals   Patient goals : To feel better and go home! Co-treatment with PT warranted secondary to decreased safety and independence requiring 2 skilled therapy professionals to address individual discipline's goals. OT addressing preparation for ADL transfer, sitting balance for increased ADL performance, sitting/activity tolerance, functional reaching, environmental safety/scanning, fall prevention, functional mobility for ADL transfers, ability to sequence and follow directions and functional UE strength.     Therapy Time   Individual Concurrent Group Co-treatment   Time In 1151(plus 10 min for chart review/RN communication)         Time Out 1227         Minutes 36

## 2021-01-05 NOTE — PROGRESS NOTES
Pulmonary Critical Care Progress Note    Patient seen for the follow up of Pneumonia due to COVID-19 virus     Subjective:    He has not been wearing BiPAP over 2 to 3 days and has been on FiO2  45% on high-flow nasal cannula. He still requires Precedex. He is sitting in chair. Denies chest pain. Mild occasional cough, mostly dry. He  Has tolerated full liquids . Examination:    Vitals: BP (!) 94/54   Pulse 61   Temp 98.2 °F (36.8 °C) (Oral)   Resp 21   Ht 5' 6\" (1.676 m)   Wt 136 lb (61.7 kg)   SpO2 91%   BMI 21.95 kg/m²   SpO2  Av.9 %  Min: 87 %  Max: 99 %  General appearance: alert oriented   Neck: No JVD  Lungs: Decreased breath sound no crackles or wheezing  Heart: regular rate and rhythm, S1, S2 normal, no gallop  Abdomen: Soft, non tender, + BS  Extremities: no cyanosis or clubbing. No significant edema    LABs:    CBC:   Recent Labs     21  0600 21  0442   WBC 6.9 5.6   HGB 11.6* 12.1*   HCT 35.9* 37.1*   * 111*     BMP:   Recent Labs     21  0600 21  0442   * 135   K 3.7 3.6*   CO2 39* 39*   BUN 16 24*   CREATININE <0.40* <0.40*   LABGLOM CANNOT BE CALCULATED CANNOT BE CALCULATED   GLUCOSE 100* 153*       Recent Labs     21  0600 21  0442   AST 16 18   ALT 17 26   LABALBU 2.9* 3.2*     Results for Reese Valdes (MRN [id number]) as of 2021 16:50   Ref. Range 2021 05:04 1/3/2021 06:00   D-Dimer, Quant Latest Ref Range: 0.00 - 0.59 mg/L FEU 0.51 0.46       Results for Reese Valdes (MRN [id number]) as of 2021 14:59   Ref. Range 2020 10:53   Pro-BNP Latest Ref Range: <300 pg/mL 842 (H)   Results for Reese Valdes (MRN [id number]) as of 2021 16:50   Ref.  Range 2021 04:42   Procalcitonin Latest Ref Range: <0.09 ng/mL 0.11 (H)     Radiology:    Chest x-ray     No significant interval change in multifocal bilateral airspace disease as   compared to prior           Impression:    · Acute  hypoxic respiratory failure ·   COVID-19  Pneumonia  ·  mild pulmonary edema/ mild systolic heart failure  ·  history of interstitial lung fibrosis  ·  ex-smoker possible COPD  · Moderate pulmonary hypertension   ·  mild thrombocytopenia      Recommendations:    · oxygen with BiPAP support  ·  high-flow oxygen as tolerated  · Wean off Precedex drip  · Make Seroquel 50 mg twice daily  · thickened liquids as tolerated  ·  speech evaluation later improved FiO2  ·  prone as tolerated  ·  airborne isolation  ·  DuoNeb by nebulizer q.4 hours  ·  Pulmicort 0.5 q.12 hours  ·  Brovana q.12 hours  ·  Mucomyst by nebulizer 3 times daily  ·  Decadron 6 mg IV daily  ·  remdesivir by ID  ·  check procalcitonin  ·  repeat Solu-Medrol 60 IV x1  ·  make Lasix 40 IV  Q 24 hrs /monitor urine output  ·  monitor D-dimer and procalcitonin  ·  discussed with RN RT  · Bedside physical therapy  ·  DVT prophylaxis with Lovenox /monitor platelets    Nury Calabrese MD, [facility]  Pulmonary Critical Care and Sleep Medicine,  [facility]  Cell: [phone]  Office: [phone]

## 2021-01-05 NOTE — PROGRESS NOTES
[facility]  Office: [address], DO, Holt Pierce, DO, Pepe Morgan, DO, Radha Rodríguez, DO, Barry Stark MD, Juan Mckeon MD, Heather Gant MD, Isabella Delatorre MD, Sharyle Anon, MD, Veronica Woods MD, Pavel Bunch MD, Ivanna Cabello MD, Anuel Prieto MD, Sophie Palubmo, DO, Meryle Schuller, MD, Saji Leonard MD, Los Johnson, DO, Tesha Mina MD,  Rut Chua DO, Joan Dueñas MD, Jared Hines MD, Nixon Jaquez, [facility], [facility], CNP, Marian Ruth, CNP, Megan Gilbert, CNS, Guilherme Cardona, CNP, Lena Penn, CNP, Terryann Collet, CNP, Paula Ray, CNP, Damián Archibald, CNP, Alexei Rivero PA-C, Job Meza, [facility], Timmy Harris, CNP, Keshawn Martinez, CNP, Mark Bradley, CNP, Yandy Cui, CNP, Maria Esther Kelley, [facility]    Progress Note    1/5/2021    3:52 PM    Name:   Levora Severin  MRN:     [id number]     Acct:      [de-identified]   Room:   [address] [place] [address] Day:  6  Admit Date:  12/30/2020 10:47 AM    PCP:   Shayne Zamora MD  Code Status:  DNR-CCA    Subjective:     C/C:   Chief Complaint   Patient presents with    Shortness of Breath     pt states gradually gotten worse over the past few months     Interval History Status: improved. Patient was seen and evaluated at bedside this morning.     Patient continues to be on high flow nasal cannula at this time,  Needing 50 L, 45% FiO2      Brief History: Patient is a resident at assisted living facility. Patient reports that the facility has recently had an outbreak of Covid. Patient reports over the past 3 days he has been experiencing exertional fatigue followed rapidly by a persistent cough with exertional dyspnea over the past 24 hours. Patient reports that the symptoms are present in between his ADLs and he has been having increasing difficulty with breathing throughout the day. Patient reports to the freestanding emergency department by EMS where he was found to be profoundly hypoxic and was started on supplemental oxygen by nonrebreather mask at 15 L. Initial lab testing was completed and finds patient is Covid positive. Patient is admitted to the hospital for acute respiratory failure with hypoxia and will be treated aggressively with standard Covid therapy. Review of Systems:     Constitutional:  negative for chills, fevers, sweats  Respiratory: Still coughing, shortness of breath  Cardiovascular:  negative for chest pain,   Gastrointestinal:  negative for abdominal pain, constipation, diarrhea, nausea, vomiting  Neurological:  negative for dizziness, headache    Medications: Allergies:     Allergies   Allergen Reactions    Bactrim     Other      Sweet potatoes     Prednisone Other (See Comments)     Trouble voiding       Current Meds:   Scheduled Meds:    acetylcysteine  600 mg Inhalation TID    furosemide  40 mg Intravenous Daily    QUEtiapine  25 mg Oral BID    midodrine  5 mg Oral TID     sodium chloride flush  10 mL Intravenous 2 times per day    ipratropium-albuterol  1 ampule Inhalation Q4H WA    enoxaparin  40 mg Subcutaneous Daily    aspirin  81 mg Oral Daily    Pirfenidone  3 capsule Oral TID     famotidine  40 mg Oral Daily    fluticasone  2 spray Each Nostril Daily    tamsulosin  0.4 mg Oral Daily    Arformoterol Tartrate  15 mcg Nebulization BID    budesonide  1 mg Nebulization BID  sodium chloride flush  10 mL Intravenous 2 times per day    dexamethasone  6 mg Oral Daily    Vitamin D  6,000 Units Oral Daily    zinc sulfate  50 mg Oral Daily     Continuous Infusions:    dexmedetomidine (PRECEDEX) IV infusion 0.1 mcg/kg/hr (21 1500)    sodium chloride       PRN Meds: polyethylene glycol, sodium chloride flush, ALPRAZolam, calcium carbonate, albuterol, sodium chloride flush, potassium chloride **OR** potassium alternative oral replacement **OR** potassium chloride, magnesium sulfate, promethazine **OR** ondansetron, nicotine, acetaminophen **OR** acetaminophen, sodium chloride, guaiFENesin-dextromethorphan, sodium chloride    Data:     Past Medical History:   has a past medical history of Asthma, COPD (chronic obstructive pulmonary disease) ([facility] and Pulmonary fibrosis ([facility] Social History:   reports that he quit smoking about 53 years ago. He has never used smokeless tobacco. He reports that he does not drink alcohol or use drugs. Family History:   Family History   Problem Relation Age of Onset    Heart Attack Mother     Heart Attack Father        Vitals:  BP (!) 94/54   Pulse 71   Temp 98.2 °F (36.8 °C) (Oral)   Resp (!) 38   Ht 5' 6\" (1.676 m)   Wt 136 lb (61.7 kg)   SpO2 90%   BMI 21.95 kg/m²   Temp (24hrs), Av.1 °F (36.7 °C), Min:97.9 °F (36.6 °C), Max:98.2 °F (36.8 °C)    No results for input(s): POCGLU in the last 72 hours. I/O (24Hr):     Intake/Output Summary (Last 24 hours) at 2021 1552  Last data filed at 2021 1300  Gross per 24 hour   Intake 2075.8 ml   Output 1600 ml   Net 475.8 ml       Labs:  Hematology:  Recent Labs     21  0600 21  0442   WBC 6.9 5.6   RBC 3.88* 4.06*   HGB 11.6* 12.1*   HCT 35.9* 37.1*   MCV 92.5 91.4   MCH 29.9 29.8   MCHC 32.3 32.6   RDW 14.8* 14.6*   * 111*   MPV 11.2 11.0   CRP 97.3*  --    DDIMER 0.46  --      Chemistry:  Recent Labs     21  0600 21  0442   * 135 K 3.7 3.6*   CL 89* 87*   CO2 39* 39*   GLUCOSE 100* 153*   BUN 16 24*   CREATININE <0.40* <0.40*   ANIONGAP 5* 9   LABGLOM CANNOT BE CALCULATED CANNOT BE CALCULATED   GFRAA CANNOT BE CALCULATED CANNOT BE CALCULATED   CALCIUM 8.7 9.0     Recent Labs     01/03/21  0600 01/04/21  0442   PROT 5.9* 6.3*   LABALBU 2.9* 3.2*   AST 16 18   ALT 17 26   ALKPHOS 55 61   BILITOT 0.45 0.36     ABG:No results found for: POCPH, PHART, PH, POCPCO2, PML0PFC, PCO2, POCPO2, PO2ART, PO2, POCHCO3, VJG1YWY, HCO3, NBEA, PBEA, BEART, BE, THGBART, THB, KXG8YGM, FDCD8HIE, H9ZHGBAD, O2SAT, FIO2  Lab Results   Component Value Date/Time    [facility] 12/30/2020 12:45 PM     Lab Results   Component Value Date/Time    CULTURE NO GROWTH 6 DAYS 12/30/2020 12:45 PM       Radiology:  Xr Chest Portable    Result Date: 1/1/2021  No significant interval change. Diffuse bilateral patchy airspace opacity and interstitial thickening. Xr Chest Portable    Result Date: 12/30/2020  Patchy diffuse bilateral pulmonary opacification most consistent with multifocal pneumonia including atypical viral pneumonia.        Physical Examination:        General appearance:  alert, cooperative and no distress  Mental Status:  oriented to person, place and time and normal affect  Lungs: Bilateral coarse breath sounds  Heart:  regular rate and rhythm, no murmur  Abdomen:  soft, NT  Extremities:  no edema, redness,  Skin:  no gross lesions, rashes, induration    Assessment:        Hospital Problems           Last Modified POA    * (Principal) Pneumonia due to COVID-19 virus 12/30/2020 Yes    COPD (chronic obstructive pulmonary disease) ([facility] [address] 12/30/2020 Yes    Dyslipidemia 12/30/2020 Yes    Pulmonary interstitial fibrosis ([facility] [address] 12/30/2020 Yes    Gastroesophageal reflux disease without esophagitis 12/30/2020 Yes    Former smoker 12/30/2020 Yes    Benign prostatic hyperplasia 12/30/2020 Yes    Anxiety 12/30/2020 Yes Acute-on-chronic respiratory failure ([facility] [address] 12/30/2020 Yes    COVID-19 12/30/2020 Yes    SIRS (systemic inflammatory response syndrome) ([facility] 12/30/2020 Yes          Plan:        1. Status post remdesivir  2. Continue dexamethasoneday 7/10  3. S/p convalescent plasma    4. Heated high flow O2 as well as adult BiPAP protocol at respiratory discretion  5. Midodrine added due to hypotension  6. Maintain indwelling urinary catheter for BPH with urinary retention as well as Flomax  7. PPI for GERD, Lipitor for dyslipidemia  8. Anxiety management with home medication as well as Xanax  9. Consult pulmonology  10. Add procalcitonin, blood cultures, trend lactate, trend troponin  11.  Lovenox 30 twice daily for anticoagulation with Singh Clements MD  1/5/2021  3:52 PM

## 2021-01-05 NOTE — PROGRESS NOTES
Physical Therapy  Facility/Department: [facility] ICU  Daily Treatment Note  NAME: Samira Bermudez  : 1933  MRN: [id number]    Date of Service: 2021    Discharge Recommendations:  Subacute/Skilled Nursing Facility        Assessment   Body structures, Functions, Activity limitations: Decreased functional mobility ; Decreased endurance;Decreased strength;Decreased balance  Assessment: Pt with deficits of bed mobility, transfers, ambulation, balance, cognition, safety awareness and endurance this session,  & requires 2 asist for bed mboility,  transfers & gait. With current deficits & activity limits, recommend SNF at D/C to return to baseline function and a safe D/C back to home environment. Pt requires continued PT to maximize independence with functional mobility, balance, safety awareness & activity tolerance. Prognosis: Good  Decision Making: Medium Complexity  Exam: functional mobility, activity tolerance, Balance, & [employer] AM-PAC 6 Clicks Basic Mobility  Clinical Presentation: evolving  PT Education: PT Role;Plan of Care;General Safety; Energy Conservation  Patient Education: Ed optimal breathing techniques, ex's with optimal breathing, posture & positioning for optimal breathing  REQUIRES PT FOLLOW UP: Yes  Activity Tolerance  Activity Tolerance: Patient limited by endurance(saO2 at 83-92% on hi flow requiring frequent rests for Educated patient on pursed lip breathing to increase control of breathing. Initiated by breathing through the nose and blowing out with pursed lip to increase O2 into lungs & stabilize saO2 levels      Patient Diagnosis(es): The primary encounter diagnosis was COVID-19. Diagnoses of Pulmonary fibrosis ([address] and Pneumonia due to organism were also pertinent to this visit. has a past medical history of Asthma, COPD (chronic obstructive pulmonary disease) ([address] and Pulmonary fibrosis ([address] has a past surgical history that includes pre-malignant / benign skin lesion excision (09/12/2017). Restrictions  Restrictions/Precautions  Restrictions/Precautions: General Precautions, Contact Precautions  Required Braces or Orthoses?: No  Position Activity Restriction  Other position/activity restrictions: droplet + for +covid-19, DNR-CCA, telemetry, up with assist, hi flow, continuous pulse ox & wired telemetry, LUE IV  Subjective   General  Chart Reviewed: Yes  Response To Previous Treatment: Not applicable  Family / Caregiver Present: No  Subjective  Subjective: Pt agreeable to PT  General Comment  Comments: RN sourav PT, states Dr wants pt up in chair  Pain Screening  Patient Currently in Pain: Denies  Vital Signs  BP Location: Left upper arm  Level of Consciousness: Alert (0)  Patient Currently in Pain: Denies       Orientation  Orientation  Overall Orientation Status: Within Normal Limits  Cognition   Cognition  Overall Cognitive Status: Exceptions  Arousal/Alertness: Appropriate responses to stimuli  Following Commands: Follows multistep commands with repitition  Attention Span: Appears intact  Memory: Appears intact  Safety Judgement: Decreased awareness of need for safety;Decreased awareness of need for assistance  Problem Solving: Decreased awareness of errors;Assistance required to correct errors made;Assistance required to implement solutions;Assistance required to identify errors made  Insights: Decreased awareness of deficits  Initiation: Requires cues for some  Sequencing: Requires cues for some  Objective   Bed mobility  Rolling to Left: Moderate assistance  Supine to Sit: Moderate assistance;2 Person assistance  Sit to Supine: (Pt request to sit in bedside chair)  Scooting:  Moderate assistance;2 Person assistance Comment: Mod verbal instruction/tactile assist for UE hand placement on rail and proper rolling technique + support of LE's to come in/OOB & pursed lip breathing to increase control of breathing with increased time needed + extra assist for MULTIPLE lines  Transfers  Sit to Stand: Moderate Assistance;2 Person Assistance  Stand to sit: Moderate Assistance;2 Person Assistance  Bed to Chair: Moderate assistance;2 Person Assistance  Stand Pivot Transfers: Moderate Assistance;2 Person Assistance  Lateral Transfers: Moderate Assistance;2 Person Assistance  Comment: Ed + tactile assist on correct use of upper body for safe sit/stand, 2nd assist needed for safety & MULTIPLE Lines  Ambulation  Ambulation?: Yes  Ambulation 1  Surface: level tile  Device: Rolling Walker  Other Apparatus: O2(Hi flow)  Assistance: Moderate assistance;2 Person assistance  Quality of Gait: short shuffle steps  Distance: 2ft to BSC, then 2 ft back to sit EOB, rested & then 3 ft bed to chair  Required several minute rest break after sat back to EOB 2* drop of saO2  Stairs/Curb  Stairs?: No     Balance  Posture: Fair  Sitting - Static: Good  Sitting - Dynamic: Good;-  Standing - Static: Fair;+(R/W)  Standing - Dynamic: Fair(R/W)  Exercises  Comments: Ed optimal breathing techniques, posture & positioning for optimal breathing    All lines intact, call light within reach, and patient positioned comfortably at end of treatment. All patient needs addressed prior to ending therapy session.                          G-Code     OutComes Score                                                     AM-PAC Score  AM-PAC Inpatient Mobility Raw Score : 12 (01/05/21 1225)  AM-PAC Inpatient T-Scale Score : 35.33 (01/05/21 1225)  Mobility Inpatient CMS 0-100% Score: 68.66 (01/05/21 1225)  Mobility Inpatient CMS G-Code Modifier : CL (01/05/21 1225)          Goals  Short term goals  Time Frame for Short term goals: 12 treatments Short term goal 1: Inc bed-mobility & transfers to independent to enable pt to safely get in/OOB  Short term goal 2: Inc gait to amb 150ft or > indep w/ RW to enable pt to return to previous level of independence  Short term goal 3: Inc strength to [address] standing balance to good with device to facilitate pt independence for performance of ADL's & functional mobility, & reduce fall risk; Short term goal 4: Pt able to tolerate 30-40 min of activity to include 15-20 reps of ex & functional mobility including 5 minutes of standing to facilitate activity tolerance to [facility]; Short term goal 5: Ed use of incentive spirometer & optimal breathing techniques, ex's with incorporation of breathing techniques, posture & positioning for optimal breathing &  mental training ex's & issue written pt education  Patient Goals   Patient goals : Back to Rehab then to [facility]. Plan    Plan  Times per week: 1-2x/day,6-7 days/week  Current Treatment Recommendations: Strengthening, Balance Training, Endurance Training, Gait Training, Functional Mobility Training, Transfer Training, Stair training  Safety Devices  Type of devices: Nurse notified, Call light within reach, Left in bed  Restraints  Initially in place: No     Therapy Time   Individual Concurrent Group Co-treatment   Time In 1153         Time Out 1227         Minutes 29              Co-treatment with OT warranted secondary to decreased safety and independence requiring 2 skilled therapy professionals to address individual discipline's goals. PT addressing pre gait trunk strengthening, weight shifting prior to transfers, transfer training and postural control in sitting.       [address], PT

## 2021-01-05 NOTE — FLOWSHEET NOTE
Patient in COVID-19 isolation; sleeping; no family present. Writer prays for patient from [facility]. Spiritual Care will follow as needed.      01/05/21 1239   Encounter Summary   Services provided to: Patient not available   Referral/Consult From: Diane   Continue Visiting   (1/5/21 sleeping)   Complexity of Encounter Low   Length of Encounter 15 minutes   Routine   Type Follow up   Assessment Sleeping   Intervention Prayer

## 2021-01-06 ENCOUNTER — APPOINTMENT (OUTPATIENT)
Dept: GENERAL RADIOLOGY | Age: 86
DRG: 871 | End: 2021-01-06
Payer: MEDICARE

## 2021-01-06 PROBLEM — U07.1 ACUTE RESPIRATORY FAILURE DUE TO COVID-19 (HCC): Status: ACTIVE | Noted: 2021-01-06

## 2021-01-06 PROBLEM — A41.89 SEPSIS DUE TO COVID-19 (HCC): Status: ACTIVE | Noted: 2021-01-06

## 2021-01-06 PROBLEM — J96.00 ACUTE RESPIRATORY FAILURE DUE TO COVID-19 (HCC): Status: ACTIVE | Noted: 2021-01-06

## 2021-01-06 PROBLEM — U07.1 SEPSIS DUE TO COVID-19 (HCC): Status: ACTIVE | Noted: 2021-01-06

## 2021-01-06 PROCEDURE — 6370000000 HC RX 637 (ALT 250 FOR IP): Performed by: INTERNAL MEDICINE

## 2021-01-06 PROCEDURE — 71045 X-RAY EXAM CHEST 1 VIEW: CPT

## 2021-01-06 PROCEDURE — 97530 THERAPEUTIC ACTIVITIES: CPT

## 2021-01-06 PROCEDURE — 2000000000 HC ICU R&B

## 2021-01-06 PROCEDURE — 94761 N-INVAS EAR/PLS OXIMETRY MLT: CPT

## 2021-01-06 PROCEDURE — 2580000003 HC RX 258: Performed by: INTERNAL MEDICINE

## 2021-01-06 PROCEDURE — 6370000000 HC RX 637 (ALT 250 FOR IP): Performed by: NURSE PRACTITIONER

## 2021-01-06 PROCEDURE — 6360000002 HC RX W HCPCS: Performed by: INTERNAL MEDICINE

## 2021-01-06 PROCEDURE — 94640 AIRWAY INHALATION TREATMENT: CPT

## 2021-01-06 PROCEDURE — 6360000002 HC RX W HCPCS: Performed by: NURSE PRACTITIONER

## 2021-01-06 PROCEDURE — 2700000000 HC OXYGEN THERAPY PER DAY

## 2021-01-06 PROCEDURE — 99233 SBSQ HOSP IP/OBS HIGH 50: CPT | Performed by: INTERNAL MEDICINE

## 2021-01-06 PROCEDURE — 97535 SELF CARE MNGMENT TRAINING: CPT

## 2021-01-06 RX ORDER — SODIUM CHLORIDE 9 MG/ML
INJECTION, SOLUTION INTRAVENOUS CONTINUOUS
Status: DISCONTINUED | OUTPATIENT
Start: 2021-01-06 | End: 2021-01-07

## 2021-01-06 RX ADMIN — MIDODRINE HYDROCHLORIDE 5 MG: 5 TABLET ORAL at 09:00

## 2021-01-06 RX ADMIN — MIDODRINE HYDROCHLORIDE 5 MG: 5 TABLET ORAL at 12:00

## 2021-01-06 RX ADMIN — SODIUM CHLORIDE: 9 INJECTION, SOLUTION INTRAVENOUS at 16:52

## 2021-01-06 RX ADMIN — ARFORMOTEROL TARTRATE 15 MCG: 15 SOLUTION RESPIRATORY (INHALATION) at 20:20

## 2021-01-06 RX ADMIN — IPRATROPIUM BROMIDE AND ALBUTEROL SULFATE 1 AMPULE: .5; 3 SOLUTION RESPIRATORY (INHALATION) at 15:19

## 2021-01-06 RX ADMIN — ACETYLCYSTEINE 600 MG: 200 SOLUTION ORAL; RESPIRATORY (INHALATION) at 06:12

## 2021-01-06 RX ADMIN — TAMSULOSIN HYDROCHLORIDE 0.4 MG: 0.4 CAPSULE ORAL at 21:49

## 2021-01-06 RX ADMIN — IPRATROPIUM BROMIDE AND ALBUTEROL SULFATE 1 AMPULE: .5; 3 SOLUTION RESPIRATORY (INHALATION) at 11:40

## 2021-01-06 RX ADMIN — ENOXAPARIN SODIUM 40 MG: 40 INJECTION SUBCUTANEOUS at 09:00

## 2021-01-06 RX ADMIN — ACETYLCYSTEINE 600 MG: 200 SOLUTION ORAL; RESPIRATORY (INHALATION) at 15:19

## 2021-01-06 RX ADMIN — FAMOTIDINE 40 MG: 20 TABLET, FILM COATED ORAL at 21:49

## 2021-01-06 RX ADMIN — QUETIAPINE FUMARATE 50 MG: 25 TABLET ORAL at 21:49

## 2021-01-06 RX ADMIN — Medication 50 MG: at 09:00

## 2021-01-06 RX ADMIN — BUDESONIDE 1000 MCG: 0.5 SUSPENSION RESPIRATORY (INHALATION) at 20:11

## 2021-01-06 RX ADMIN — DEXAMETHASONE 6 MG: 4 TABLET ORAL at 09:00

## 2021-01-06 RX ADMIN — ARFORMOTEROL TARTRATE 15 MCG: 15 SOLUTION RESPIRATORY (INHALATION) at 06:23

## 2021-01-06 RX ADMIN — MIDODRINE HYDROCHLORIDE 5 MG: 5 TABLET ORAL at 17:52

## 2021-01-06 RX ADMIN — Medication 6000 UNITS: at 12:41

## 2021-01-06 RX ADMIN — BUDESONIDE 1000 MCG: 0.5 SUSPENSION RESPIRATORY (INHALATION) at 06:18

## 2021-01-06 RX ADMIN — IPRATROPIUM BROMIDE AND ALBUTEROL SULFATE 1 AMPULE: .5; 3 SOLUTION RESPIRATORY (INHALATION) at 20:11

## 2021-01-06 RX ADMIN — QUETIAPINE FUMARATE 50 MG: 25 TABLET ORAL at 09:00

## 2021-01-06 RX ADMIN — FUROSEMIDE 40 MG: 10 INJECTION, SOLUTION INTRAMUSCULAR; INTRAVENOUS at 09:00

## 2021-01-06 RX ADMIN — ASPIRIN 81 MG: 81 TABLET, COATED ORAL at 09:00

## 2021-01-06 RX ADMIN — IPRATROPIUM BROMIDE AND ALBUTEROL SULFATE 1 AMPULE: .5; 3 SOLUTION RESPIRATORY (INHALATION) at 06:12

## 2021-01-06 NOTE — PROGRESS NOTES
[facility]  Office: [address], DO, Chani Mckeon, DO, Albaro Shin, DO, Brenden Rodríguez, DO, Grady Dixon MD, Nolan Shipman MD, Abbe Ahn MD, Oscar Ann MD, Brady Sherman MD, Fredy Nguyen MD, Roderick Hurt MD, Stephanie Cutler MD, Anuel Magdaleno MD, Lilly Holter, DO, Aayush Aguilar MD, Eleno Graham MD, Nicole Musa, DO, Linda Hua MD,  Emi Aquino DO, Abbie Gonzalez MD, Tyron Jurado MD, Eunice Katz, [facility], [address], CNP, Indiana Mcmillan, CNP, Gwyn Garcia, CNS, Sawyer Dutton, CNP, Coni Montano, CNP, Deja Zazueta, CNP, Mesfin Aguirre, CNP, Vanesa Gil, CNP, PHILOMENA Humphries-C, Sherrell Shaffer, [facility], Artrekha Mcfarlane, CNP, Geo Nichols, CNP, Sharon Gilliam, CNP, Cecy Mckeon, CNP, Paul [place], [facility]    Progress Note    1/6/2021    1:21 PM    Name:   Moises Lopez  MRN:     [id number]     Acct:      [de-identified]   Room:   [address] [place] [address] Day:  7  Admit Date:  12/30/2020 10:47 AM    PCP:   Hannah Griffin MD  Code Status:  DNR-CCA    Subjective:     C/C:   Chief Complaint   Patient presents with    Shortness of Breath     pt states gradually gotten worse over the past few months     Interval History Status: improved. Patient was seen and evaluated at bedside this morning.     Patient continues to be on high flow nasal cannula at this time,  Needing 50 L, 45% FiO2  Patient feeling very dry,  9.9 L negative fluid balance      Brief History: Patient is a resident at assisted living facility. Patient reports that the facility has recently had an outbreak of Covid. Patient reports over the past 3 days he has been experiencing exertional fatigue followed rapidly by a persistent cough with exertional dyspnea over the past 24 hours. Patient reports that the symptoms are present in between his ADLs and he has been having increasing difficulty with breathing throughout the day. Patient reports to the freestanding emergency department by EMS where he was found to be profoundly hypoxic and was started on supplemental oxygen by nonrebreather mask at 15 L. Initial lab testing was completed and finds patient is Covid positive. Patient is admitted to the hospital for acute respiratory failure with hypoxia and will be treated aggressively with standard Covid therapy. Review of Systems:     Constitutional:  negative for chills, fevers, sweats  Respiratory: Still coughing, shortness of breath  Cardiovascular:  negative for chest pain,   Gastrointestinal:  negative for abdominal pain, constipation, diarrhea, nausea, vomiting  Neurological:  negative for dizziness, headache    Medications: Allergies:     Allergies   Allergen Reactions    Bactrim     Other      Sweet potatoes     Prednisone Other (See Comments)     Trouble voiding       Current Meds:   Scheduled Meds:    acetylcysteine  600 mg Inhalation TID    QUEtiapine  50 mg Oral BID    furosemide  40 mg Intravenous Daily    midodrine  5 mg Oral TID     sodium chloride flush  10 mL Intravenous 2 times per day    ipratropium-albuterol  1 ampule Inhalation Q4H WA    enoxaparin  40 mg Subcutaneous Daily    aspirin  81 mg Oral Daily    Pirfenidone  3 capsule Oral TID     famotidine  40 mg Oral Daily    fluticasone  2 spray Each Nostril Daily    tamsulosin  0.4 mg Oral Daily    Arformoterol Tartrate  15 mcg Nebulization BID    budesonide  1 mg Nebulization BID  sodium chloride flush  10 mL Intravenous 2 times per day    dexamethasone  6 mg Oral Daily    Vitamin D  6,000 Units Oral Daily    zinc sulfate  50 mg Oral Daily     Continuous Infusions:    dexmedetomidine (PRECEDEX) IV infusion Stopped (21 0500)    sodium chloride       PRN Meds: polyethylene glycol, sodium chloride flush, ALPRAZolam, calcium carbonate, albuterol, sodium chloride flush, potassium chloride **OR** potassium alternative oral replacement **OR** potassium chloride, magnesium sulfate, promethazine **OR** ondansetron, nicotine, acetaminophen **OR** acetaminophen, sodium chloride, guaiFENesin-dextromethorphan, sodium chloride    Data:     Past Medical History:   has a past medical history of Asthma, COPD (chronic obstructive pulmonary disease) ([facility] [facility] and [facility] Social History:   reports that he quit smoking about 53 years ago. He has never used smokeless tobacco. He reports that he does not drink alcohol or use drugs. Family History:   Family History   Problem Relation Age of Onset    Heart Attack Mother     Heart Attack Father        Vitals:  BP (!) 108/57   Pulse 75   Temp 97.7 °F (36.5 °C) (Oral)   Resp (!) 33   Ht 5' 6\" (1.676 m)   Wt 136 lb (61.7 kg)   SpO2 93%   BMI 21.95 kg/m²   Temp (24hrs), Av.2 °F (36.8 °C), Min:97.7 °F (36.5 °C), Max:99 °F (37.2 °C)    No results for input(s): POCGLU in the last 72 hours. I/O (24Hr):     Intake/Output Summary (Last 24 hours) at 2021 1321  Last data filed at 2021 1242  Gross per 24 hour   Intake 876 ml   Output 2300 ml   Net -1424 ml       Labs:  Hematology:  Recent Labs     21  0442 21  1906   WBC 5.6  --    RBC 4.06*  --    HGB 12.1*  --    HCT 37.1*  --    MCV 91.4  --    MCH 29.8  --    MCHC 32.6  --    RDW 14.6*  --    *  --    MPV 11.0  --    CRP  --  40.7*   DDIMER  --  0.65*     Chemistry:  Recent Labs     21  0442      K 3.6*   CL 87*   CO2 39*   GLUCOSE 153* BUN 24*   CREATININE <0.40*   ANIONGAP 9   LABGLOM CANNOT BE CALCULATED   GFRAA CANNOT BE CALCULATED   CALCIUM 9.0     Recent Labs     01/04/21  0442   PROT 6.3*   LABALBU 3.2*   AST 18   ALT 26   ALKPHOS 61   BILITOT 0.36     ABG:No results found for: POCPH, PHART, PH, POCPCO2, KTP1PNJ, PCO2, POCPO2, PO2ART, PO2, POCHCO3, ZNK5SYQ, HCO3, NBEA, PBEA, BEART, BE, THGBART, THB, APJ4VVM, MFHI8LDJ, H4HGYCYY, O2SAT, FIO2  Lab Results   Component Value Date/Time    SPECIAL LEFT [facility] 12/30/2020 12:45 PM     Lab Results   Component Value Date/Time    CULTURE NO GROWTH 6 DAYS 12/30/2020 12:45 PM       Radiology:  Xr Chest Portable    Result Date: 1/1/2021  No significant interval change. Diffuse bilateral patchy airspace opacity and interstitial thickening. Xr Chest Portable    Result Date: 12/30/2020  Patchy diffuse bilateral pulmonary opacification most consistent with multifocal pneumonia including atypical viral pneumonia.        Physical Examination:        General appearance:  alert, cooperative and no distress  Mental Status:  oriented to person, place and time and normal affect  Lungs: Bilateral coarse breath sounds  Heart:  regular rate and rhythm, no murmur  Abdomen:  soft, NT  Extremities:  no edema, redness,  Skin:  no gross lesions, rashes, induration    Assessment:        Hospital Problems           Last Modified POA    * (Principal) Acute respiratory failure due to COVID-19 ([facility] 1/6/2021 Yes    COPD (chronic obstructive pulmonary disease) ([address] 12/30/2020 Yes    Dyslipidemia 12/30/2020 Yes    Pulmonary interstitial fibrosis ([address] 12/30/2020 Yes    Pneumonia due to COVID-19 virus 1/6/2021 Yes    Gastroesophageal reflux disease without esophagitis 12/30/2020 Yes    Former smoker 12/30/2020 Yes    Benign prostatic hyperplasia 12/30/2020 Yes    Anxiety 12/30/2020 Yes    Acute-on-chronic respiratory failure ([address] 12/30/2020 Yes    COVID-19 12/30/2020 Yes SIRS (systemic inflammatory response syndrome) ([facility] 12/30/2020 Yes          Plan:        1. Acute respiratory failure, sepsis due to Covid, status post remdesivir, dexamethasoneday 8/10, S/p convalescent plasma  ,  2. Patient feeling very dry, 9.9 L negative, will start IV fluids at 50 mL/h  3. Heated high flow O2 as well as adult BiPAP protocol at respiratory discretion  4. Midodrine added due to hypotension  5. Maintain indwelling urinary catheter for BPH with urinary retention as well as Flomax  6. PPI for GERD, Lipitor for dyslipidemia  7. Anxiety management with home medication as well as Xanax  8. Consult pulmonology  9. Add procalcitonin, blood cultures, trend lactate, trend troponin  10.  Lovenox 30 twice daily for anticoagulation with Covid    Keyon Pierson MD  1/6/2021  1:20 PM

## 2021-01-06 NOTE — PROGRESS NOTES
Occupational Therapy  Facility[facility]  Daily Treatment Note  NAME: Adele Gonzalez  : 1933  MRN: [id number]    Date of Service: 2021    Discharge Recommendations:  Subacute/Skilled Nursing Facility  OT Equipment Recommendations  Equipment Needed: Yes  Mobility Devices: ADL Assistive Devices  ADL Assistive Devices: Sock-Aid Soft;Reacher; Toileting - 3-in-1 Commode;Long-handled Sponge;Emergency Alert System;Grab Bars - shower;Grab Bars - toilet;Long-handled [place]    Assessment   Performance deficits / Impairments: Decreased functional mobility ; Decreased ADL status; Decreased strength;Decreased safe awareness;Decreased cognition;Decreased endurance;Decreased balance;Decreased high-level IADLs;Decreased fine motor control;Decreased coordination;Decreased posture  Assessment: Pt would benefit from continued skilled OT services to Maximize I and safety during functional task to return home at [facility] with assist.  Prognosis: Fair  OT Education: OT Role;Plan of Care;ADL Adaptive Strategies;Transfer Training;Energy Conservation  REQUIRES OT FOLLOW UP: Yes  Activity Tolerance  Activity Tolerance: Patient Tolerated treatment well;Patient limited by fatigue;Treatment limited secondary to medical complications (free text)(limited by respiratory status)  Safety Devices  Safety Devices in place: Yes  Type of devices: Call light within reach;Gait belt; All fall risk precautions in place; Left in chair;Nurse notified         Patient Diagnosis(es): The primary encounter diagnosis was COVID-19. Diagnoses of Pulmonary fibrosis ([address] and Pneumonia due to organism were also pertinent to this visit. has a past medical history of Asthma, COPD (chronic obstructive pulmonary disease) ([address] and Pulmonary fibrosis ([address] has a past surgical history that includes pre-malignant / benign skin lesion excision (2017).     Restrictions  Restrictions/Precautions Restrictions/Precautions: General Precautions, Contact Precautions  Required Braces or Orthoses?: No  Position Activity Restriction  Other position/activity restrictions: droplet + for +covid-19, DNR-CCA, telemetry, up with assist, hi flow, continuous pulse ox & wired telemetry, LUE IV  Subjective   General  Chart Reviewed: Yes  Patient assessed for rehabilitation services?: Yes  Response to previous treatment: Patient with no complaints from previous session  Family / Caregiver Present: No      Orientation  Orientation  Overall Orientation Status: Impaired  Objective    ADL  UE Dressing: Maximum assistance(supine in bed, line mgmt)  Additional Comments: Pt is DEP with all ADLs at this time d/t x2 staff assist when up. Patient is limited with respiratory status and high anxiety. RN and RT in room during session ok'd patient to get up to chair on Hi Flow        Balance  Sitting Balance: Stand by assistance(RN seated next to patient in attempt to reduce anxiety)  Standing Balance: Moderate assistance(x2, MULTIPLE LINES)    Functional Mobility  Functional - Mobility Device: Rolling Walker  Activity: Other  Assist Level: Moderate assistance(x2)  Functional Mobility Comments: Patient completed 4-5 steps from bed>recliner with use of RW on Hi Flow, verbal cues for sequencing/technique, posture, unable to give multiple cues at this time d/t patients high anxiety. Total assist for line mgmt to increase safety and reduce fall risk    Bed mobility  Supine to Sit: Moderate assistance;2 Person assistance;Maximum assistance  Scooting: Moderate assistance;2 Person assistance;Maximal assistance  Comment: Verbal cues for sequencing/technique, tactile cues for initaion and hand placements    Transfers  Sit to stand:  Moderate assistance;2 Person assistance  Stand to sit: Moderate assistance;2 Person assistance Transfer Comments: Pt required Mod verbal cues/tactile assist for RW safety, hand placement on RW, upright posture, initiation of task, pursed lip breathing, nose over toes, weight shifting and awareness/assist with lines all to increase safety. Cognition  Overall Cognitive Status: Exceptions  Arousal/Alertness: Appropriate responses to stimuli  Following Commands: Follows multistep commands with repitition  Attention Span: Attends with cues to redirect; Difficulty dividing attention  Memory: Decreased short term memory;Decreased recall of recent events;Decreased recall of precautions  Safety Judgement: Decreased awareness of need for safety;Decreased awareness of need for assistance  Problem Solving: Decreased awareness of errors;Assistance required to correct errors made;Assistance required to implement solutions;Assistance required to identify errors made;Assistance required to generate solutions  Insights: Decreased awareness of deficits  Initiation: Requires cues for all  Sequencing: Requires cues for all  Cognition Comment: Patient very anxious. RN and RT in room during session d/t patient feeling anxious and patient desatting on 6L O2, RT turned on HiFlow and patient agreeable      Plan   Plan  Times per week: 4-5x/wk 1x/day as hua  Current Treatment Recommendations: Strengthening, Balance Training, Functional Mobility Training, Endurance Training, Equipment Evaluation, Education, & procurement, Patient/Caregiver Education & Training, Safety Education & Training, Self-Care / ADL  AM-PAC Score        AM-PAC Inpatient Daily Activity Raw Score: 13 (01/06/21 1306)  AM-PAC Inpatient ADL T-Scale Score : 32.03 (01/06/21 1306)  ADL Inpatient CMS 0-100% Score: 63.03 (01/06/21 1306)  ADL Inpatient CMS G-Code Modifier : CL (01/06/21 1306)    Goals  Short term goals  Time Frame for Short term goals: By discharge, pt to demo  Short term goal 1: bed mobility to Min A of 1 with use of bed rail as needed. Short term goal 2: UB ADLs to SBA/LB ADLs to Min A with use of AD/AE as needed. Short term goal 3: increased BUE strength by 1/2 grade/I with simple BUE HEP to assist with self care tasks with use of handouts as needed  Short term goal 4: ADL transfers and functional mobility to Min A of 1 with use of AD as needed. Short term goal 5: toileting to Min A with use of AD/grab bars as needed. Long term goals  Long term goal 1: Pt to demo increased standing hua > 5 min with Min A using AD as needed to reduce fall risk during self care tasks. Long term goal 2: Pt/caregivers to be I with fall prevention/ EC/WS, recommendations for AE/DME and pressure relief with use of handouts as needed. Patient Goals   Patient goals : To feel better and go home! Therapy Time   Individual Concurrent Group Co-treatment   Time In       18   Time Out       46   Minutes       28     Co-treatment with PT warranted secondary to decreased safety and independence requiring 2 skilled therapy professionals to address individual discipline's goals. OT addressing preparation for ADL transfer, sitting balance for increased ADL performance, sitting/activity tolerance, functional reaching, environmental safety/scanning, fall prevention, functional mobility for ADL transfers, ability to sequence and follow directions and functional UE strength. Upon writer exit, call light within reach, pt retired to chair. All lines intact and patient positioned comfortably. Chair alarm in place. All patient needs addressed prior to ending therapy session. Chart reviewed prior to treatment and patient is agreeable for therapy. RN reports patient is medically stable for therapy treatment this date.       CRISPIN Youngblood/SIMONE

## 2021-01-06 NOTE — PROGRESS NOTES
Pulmonary Critical Care Progress Note  César Peguero MD     Patient seen for the follow up of  Acute respiratory failure due to COVID-19 [facility])     Subjective:  He is sitting up in the bed in no distress. He is on HFNC, 60% / 40 L. Feels her shortness of breath is better than yesterday. He has a productive cough with clear sputum. He denies chest pain. He is trying to take in orals however he is not liking his full liquid diet. Examination:  Vitals: BP (!) 108/57   Pulse 75   Temp 97.7 °F (36.5 °C) (Oral)   Resp (!) 33   Ht 5' 6\" (1.676 m)   Wt 136 lb (61.7 kg)   SpO2 93%   BMI 21.95 kg/m²   General appearance:  alert and cooperative with exam, resting in bed  Neck: No JVD  Lungs: Moderate air exchange, occasional rhonchi  Heart: regular rate and rhythm, S1, S2 normal, no gallop  Abdomen: Soft, non tender, + BS  Extremities: no cyanosis or clubbing.  No significant edema    LABs:  CBC:   Recent Labs     01/04/21 0442   WBC 5.6   HGB 12.1*   HCT 37.1*   *     BMP:   Recent Labs     01/04/21 0442      K 3.6*   CO2 39*   BUN 24*   CREATININE <0.40*   LABGLOM CANNOT BE CALCULATED   GLUCOSE 153*     LIVER PROFILE:  Recent Labs     01/04/21 0442   AST 18   ALT 26   LABALBU 3.2*     Radiology:  1/6/2021      Impression:  · Acute hypoxic respiratory failure  · COVID-19 pneumonia  · Mild pulmonary edema/acute on chronic systolic heart failure  · History of pulmonary fibrosis  · Former smoker, possible COPD  · Moderate pulmonary hypertension, RVSP 50 mmHg  · Mild thrombocytopenia, resolved    Recommendations:  · Oxygen via high flow nasal cannula, keep SPO2 greater than 92%  · BiPAP support as needed during the day and while asleep  · Monitor off of Precedex  · If FiO2/liter flow remains stable overnight, okay to proceed with video swallow study tomorrow  · Gentle IV hydration, per primary, monitor closely for fluid overload  · Seroquel 50 mg twice daily · Incentive spirometry every hour while awake  · Prone as tolerated  · Decadron 6 mg daily  · Remdesivir  · Albuterol and Ipratropium Q 4 hours and prn  · Budesonide and Brovana via nebulizer  · Discontinue Mucomyst via nebulizer 3 times daily  · Midodrine 5 mg 3 times daily  · Diuresis with IV Lasix  · X-ray chest in am  · Labs: CBC and BMP in am  · DVT prophylaxis with low molecular weight heparin, monitor platelets  · PUD prophylaxis with Pepcid  · DNR CCA  · Discussed with RN  · Will follow with you    Electronically signed by     Jeff Cintron MD on 1/6/2021 at 5:19 PM  Pulmonary Critical Care and Sleep Medicine,  [facility]  Cell: [phone]  Office: [phone]

## 2021-01-06 NOTE — PROGRESS NOTES
Patient up to chair with PT, use of walker and gait belt. Patient's oxygen saturation decreased to 78% on 6 liters. Patient was placed back on HFNC and recovered in a decent time period.

## 2021-01-06 NOTE — PROGRESS NOTES
Physical Therapy  Facility/Department: [facility] ICU  Daily Treatment Note  NAME: Joana Singh  : 1933  MRN: [id number]    Date of Service: 2021    Discharge Recommendations:  Subacute/Skilled Nursing Facility        Assessment   Body structures, Functions, Activity limitations: Decreased functional mobility ; Decreased endurance;Decreased strength;Decreased balance  Assessment: Pt with deficits of bed mobility, transfers, ambulation, balance, cognition, safety awareness and endurance this session. Pt initially on 6L O2 but desaturation to 78% so RN reapplied hi flow O2. Pt requires 2 assist for bed mboility,  transfers & gait,  With current deficits, recommend SNF at D/C to return to baseline function and a safe D/C back to home environment. Pt requires continued PT to maximize independence with functional mobility, balance, safety awareness & activity tolerance. Prognosis: Good  Decision Making: Medium Complexity  Exam: functional mobility, activity tolerance, Balance, & [employer] AM-PAC 6 Clicks Basic Mobility  Clinical Presentation: evolving  PT Education: PT Role;Plan of Care;General Safety; Energy Conservation  Patient Education: Ed optimal breathing techniques, ex's with optimal breathing, posture & positioning for optimal breathing  REQUIRES PT FOLLOW UP: Yes  Activity Tolerance  Activity Tolerance: Patient limited by endurance saO2 dropped to 78% with activity, RN reapplied hi flow & saturation returned to 88-90% with extended pursed lip breathing to increase control of breathing. Patient Diagnosis(es): The primary encounter diagnosis was COVID-19. Diagnoses of Pulmonary fibrosis ([address] and Pneumonia due to organism were also pertinent to this visit. has a past medical history of Asthma, COPD (chronic obstructive pulmonary disease) ([address] and Pulmonary fibrosis ([address] has a past surgical history that includes pre-malignant / benign skin lesion excision (2017). Restrictions  Restrictions/Precautions  Restrictions/Precautions: General Precautions, Contact Precautions  Required Braces or Orthoses?: No  Position Activity Restriction  Other position/activity restrictions: droplet + for +covid-19, DNR-CCA, telemetry, up with assist, hi flow, continuous pulse ox & wired telemetry, LUE IV  Subjective   General  Chart Reviewed: Yes  Response To Previous Treatment: Not applicable  Family / Caregiver Present: No  Subjective  Subjective: Pt agreeable to PT  General Comment  Comments: RN sourav PT, states Dr agustins pt up in chair  Pain Screening  Patient Currently in Pain: Denies  Vital Signs  BP Location: Left upper arm  Level of Consciousness: Alert (0)  Patient Currently in Pain: Denies       Orientation  Orientation  Overall Orientation Status: Within Normal Limits  Cognition   Cognition  Overall Cognitive Status: Exceptions  Arousal/Alertness: Appropriate responses to stimuli  Following Commands: Follows multistep commands with repitition  Attention Span: Attends with cues to redirect; Difficulty dividing attention  Memory: Decreased short term memory;Decreased recall of recent events;Decreased recall of precautions  Safety Judgement: Decreased awareness of need for safety;Decreased awareness of need for assistance  Problem Solving: Decreased awareness of errors;Assistance required to correct errors made;Assistance required to implement solutions;Assistance required to identify errors made;Assistance required to generate solutions  Insights: Decreased awareness of deficits  Initiation: Requires cues for all  Sequencing: Requires cues for all  Cognition Comment: Patient very anxious.  RN and RT in room during session d/t patient feeling anxious and patient desatting on 6L O2, RT turned on HiFlow and patient agreeable  Objective   Bed mobility  Supine to Sit: Moderate assistance;2 Person assistance;Maximum assistance Scooting: Moderate assistance;2 Person assistance;Maximal assistance  Comment: Mod verbal cues/tactile assist for reach & hand placement onto bed rail, proper bed mobility tech and awareness/assist with lines all to increase safety. Pt sat at EOB for 5 minutes to rest after bed mobility, for pursed lip breathing to increase control of breathing. Initiated by breathing through the nose and blowing out with pursed lip to increase O2 into lungs & prepare lines for standing & ambulation. Transfers  Sit to Stand: Moderate Assistance;2 Person Assistance  Stand to sit: Moderate Assistance;2 Person Assistance  Bed to Chair: Moderate assistance;2 Person Assistance  Stand Pivot Transfers: Moderate Assistance;2 Person Assistance  Lateral Transfers: Moderate Assistance;2 Person Assistance  Comment: Ed + tactile assist on correct use of upper body for safe sit/stand, 2nd assist needed for safety & MULTIPLE Lines  Ambulation  Ambulation?: Yes  Ambulation 1  Surface: level tile  Device: Rolling Walker  Other Apparatus: O2(Hi flow)  Assistance: Moderate assistance;2 Person assistance  Quality of Gait: short shuffle steps  Distance: 3 ft along EOB & then to chair  Stairs/Curb  Stairs?: No     Balance  Posture: Fair  Sitting - Static: Good  Sitting - Dynamic: Good;-  Standing - Static: Fair;+(R/W)  Standing - Dynamic: Fair(R/W)  Exercises  Comments: Ed optimal breathing, Educated patient on pursed lip breathing to increase control of breathing.  Initiated by breathing through the nose and blowing out with pursed lip to increase O2 into lungs, posture & positioning for optimal breathing                    G-Code     OutComes Score                                                     AM-PAC Score  AM-PAC Inpatient Mobility Raw Score : 11 (01/06/21 1225)  AM-PAC Inpatient T-Scale Score : 33.86 (01/06/21 1225)  Mobility Inpatient CMS 0-100% Score: 72.57 (01/06/21 1225)  Mobility Inpatient CMS G-Code Modifier : CL (01/06/21 1225) Goals  Short term goals  Time Frame for Short term goals: 12 treatments  Short term goal 1: Inc bed-mobility & transfers to independent to enable pt to safely get in/OOB  Short term goal 2: Inc gait to amb 150ft or > indep w/ RW to enable pt to return to previous level of independence  Short term goal 3: Inc strength to [address] standing balance to good with device to facilitate pt independence for performance of ADL's & functional mobility, & reduce fall risk; Short term goal 4: Pt able to tolerate 30-40 min of activity to include 15-20 reps of ex & functional mobility including 5 minutes of standing to facilitate activity tolerance to [facility]; Short term goal 5: Ed use of incentive spirometer & optimal breathing techniques, ex's with incorporation of breathing techniques, posture & positioning for optimal breathing &  mental training ex's & issue written pt education  Patient Goals   Patient goals : Back to Rehab then to [facility]. Plan    Plan  Times per week: 1-2x/day,6-7 days/week  Current Treatment Recommendations: Strengthening, Balance Training, Endurance Training, Gait Training, Functional Mobility Training, Transfer Training, Stair training  Safety Devices  Type of devices: Nurse notified, Call light within reach, Left in bed  Restraints  Initially in place: No     Therapy Time   Individual Concurrent Group Co-treatment   Time In  1153         Time Out  1225         Minutes  29              Co-treatment with OT warranted secondary to decreased safety and independence requiring 2 skilled therapy professionals to address individual discipline's goals. PT addressing pre gait trunk strengthening, weight shifting prior to transfers, transfer training and postural control in sitting.       [address], PT

## 2021-01-06 NOTE — PLAN OF CARE
Problem: Breathing Pattern - Ineffective  Goal: Ability to achieve and maintain a regular respiratory rate  Outcome: Ongoing  Intervention: Stress management techniques  Note: Respiratory assessment performed and charted. Lugs assessed. Monitored respiratory rate, rhythm and pattern. Instructed to call with any c/o SOB. Monitored WOB and activity tolerance. HOB elevated at 30 degrees at all times. Instructed on the importance of C&DBing. Continuous pulse oximetry. Frequent nursing rounds continued. Problem: Nutrition Deficits  Goal: Optimize nutrtional status  Outcome: Ongoing  Intervention: Bioactive substance supplement/functional foods  Note: Monitored patient's dietary intake. Instructed on the importance of adequate nutrition for healing. I&O monitored     Problem: Skin Integrity:  Intervention: Manage non-pharmacologic comfort measures  Note: Skin assessment performed and charted. Assessed for areas of redness and or breakdown. Patient has Mepilex to sacral area. Avni scale order set in place. Instructed patient on the importance of position changes every 2 hours and PRN for promotion of healing and prevention of further skin breakdown. Monitored for adequate nutritional intake. Assessed oral mucosa. Monitored for open areas, sores and thrush. Problem: Falls - Risk of: Intervention: Appropriate assistance to ensure safe transfer  Note: Continue fall precautions including arm band identification, falling star placed outside of the room and alarm at night and when unattended. Use of ambulatory aids when indicated and frequent visual checks. Monitored orientation status. Call light within easy reach at all times. Bed remains in lowest locked position. All clutter removed from room. All personal belongings within reach. Instructed to call for assistance prior to attempting to get OOB.

## 2021-01-07 ENCOUNTER — APPOINTMENT (OUTPATIENT)
Dept: GENERAL RADIOLOGY | Age: 86
DRG: 871 | End: 2021-01-07
Payer: MEDICARE

## 2021-01-07 LAB
C-REACTIVE PROTEIN: 20.1 MG/L (ref 0–5)
D-DIMER QUANTITATIVE: 0.6 MG/L FEU (ref 0–0.59)

## 2021-01-07 PROCEDURE — 6370000000 HC RX 637 (ALT 250 FOR IP): Performed by: INTERNAL MEDICINE

## 2021-01-07 PROCEDURE — 2700000000 HC OXYGEN THERAPY PER DAY

## 2021-01-07 PROCEDURE — 2000000000 HC ICU R&B

## 2021-01-07 PROCEDURE — 85379 FIBRIN DEGRADATION QUANT: CPT

## 2021-01-07 PROCEDURE — 6360000002 HC RX W HCPCS: Performed by: NURSE PRACTITIONER

## 2021-01-07 PROCEDURE — 86140 C-REACTIVE PROTEIN: CPT

## 2021-01-07 PROCEDURE — 6360000002 HC RX W HCPCS: Performed by: INTERNAL MEDICINE

## 2021-01-07 PROCEDURE — 2580000003 HC RX 258: Performed by: INTERNAL MEDICINE

## 2021-01-07 PROCEDURE — 99233 SBSQ HOSP IP/OBS HIGH 50: CPT | Performed by: INTERNAL MEDICINE

## 2021-01-07 PROCEDURE — 94640 AIRWAY INHALATION TREATMENT: CPT

## 2021-01-07 PROCEDURE — 97535 SELF CARE MNGMENT TRAINING: CPT

## 2021-01-07 PROCEDURE — 97116 GAIT TRAINING THERAPY: CPT

## 2021-01-07 PROCEDURE — 97530 THERAPEUTIC ACTIVITIES: CPT

## 2021-01-07 PROCEDURE — 6370000000 HC RX 637 (ALT 250 FOR IP): Performed by: NURSE PRACTITIONER

## 2021-01-07 PROCEDURE — 94761 N-INVAS EAR/PLS OXIMETRY MLT: CPT

## 2021-01-07 PROCEDURE — 2580000003 HC RX 258: Performed by: NURSE PRACTITIONER

## 2021-01-07 PROCEDURE — 71045 X-RAY EXAM CHEST 1 VIEW: CPT

## 2021-01-07 PROCEDURE — 36415 COLL VENOUS BLD VENIPUNCTURE: CPT

## 2021-01-07 RX ADMIN — FAMOTIDINE 40 MG: 20 TABLET, FILM COATED ORAL at 20:40

## 2021-01-07 RX ADMIN — ARFORMOTEROL TARTRATE 15 MCG: 15 SOLUTION RESPIRATORY (INHALATION) at 20:42

## 2021-01-07 RX ADMIN — IPRATROPIUM BROMIDE AND ALBUTEROL SULFATE 1 AMPULE: .5; 3 SOLUTION RESPIRATORY (INHALATION) at 05:43

## 2021-01-07 RX ADMIN — SODIUM CHLORIDE, PRESERVATIVE FREE 10 ML: 5 INJECTION INTRAVENOUS at 09:19

## 2021-01-07 RX ADMIN — SODIUM CHLORIDE, PRESERVATIVE FREE 10 ML: 5 INJECTION INTRAVENOUS at 09:08

## 2021-01-07 RX ADMIN — POLYETHYLENE GLYCOL 3350 17 G: 17 POWDER, FOR SOLUTION ORAL at 09:09

## 2021-01-07 RX ADMIN — SODIUM CHLORIDE, PRESERVATIVE FREE 10 ML: 5 INJECTION INTRAVENOUS at 09:15

## 2021-01-07 RX ADMIN — BUDESONIDE 1000 MCG: 0.5 SUSPENSION RESPIRATORY (INHALATION) at 05:43

## 2021-01-07 RX ADMIN — FUROSEMIDE 40 MG: 10 INJECTION, SOLUTION INTRAMUSCULAR; INTRAVENOUS at 09:16

## 2021-01-07 RX ADMIN — ENOXAPARIN SODIUM 40 MG: 40 INJECTION SUBCUTANEOUS at 09:08

## 2021-01-07 RX ADMIN — ARFORMOTEROL TARTRATE 15 MCG: 15 SOLUTION RESPIRATORY (INHALATION) at 05:43

## 2021-01-07 RX ADMIN — FLUTICASONE PROPIONATE 2 SPRAY: 50 SPRAY, METERED NASAL at 09:09

## 2021-01-07 RX ADMIN — DEXAMETHASONE 8 MG: 4 TABLET ORAL at 09:07

## 2021-01-07 RX ADMIN — Medication 6000 UNITS: at 09:07

## 2021-01-07 RX ADMIN — ASPIRIN 81 MG: 81 TABLET, COATED ORAL at 09:06

## 2021-01-07 RX ADMIN — Medication 50 MG: at 09:06

## 2021-01-07 RX ADMIN — TAMSULOSIN HYDROCHLORIDE 0.4 MG: 0.4 CAPSULE ORAL at 20:40

## 2021-01-07 RX ADMIN — MIDODRINE HYDROCHLORIDE 5 MG: 5 TABLET ORAL at 09:07

## 2021-01-07 RX ADMIN — MIDODRINE HYDROCHLORIDE 5 MG: 5 TABLET ORAL at 17:43

## 2021-01-07 RX ADMIN — QUETIAPINE FUMARATE 25 MG: 25 TABLET ORAL at 09:06

## 2021-01-07 RX ADMIN — IPRATROPIUM BROMIDE AND ALBUTEROL SULFATE 1 AMPULE: .5; 3 SOLUTION RESPIRATORY (INHALATION) at 15:10

## 2021-01-07 RX ADMIN — IPRATROPIUM BROMIDE AND ALBUTEROL SULFATE 1 AMPULE: .5; 3 SOLUTION RESPIRATORY (INHALATION) at 20:42

## 2021-01-07 RX ADMIN — IPRATROPIUM BROMIDE AND ALBUTEROL SULFATE 1 AMPULE: .5; 3 SOLUTION RESPIRATORY (INHALATION) at 11:24

## 2021-01-07 RX ADMIN — MIDODRINE HYDROCHLORIDE 5 MG: 5 TABLET ORAL at 12:20

## 2021-01-07 RX ADMIN — SODIUM CHLORIDE, PRESERVATIVE FREE 10 ML: 5 INJECTION INTRAVENOUS at 20:40

## 2021-01-07 RX ADMIN — QUETIAPINE FUMARATE 50 MG: 25 TABLET ORAL at 20:40

## 2021-01-07 RX ADMIN — BUDESONIDE 1000 MCG: 0.5 SUSPENSION RESPIRATORY (INHALATION) at 20:42

## 2021-01-07 NOTE — CARE COORDINATION
[facility]predict Inpatient Visit      Current discharge plan: return to assisted living    Collaboration completed with case management: Yes        Attached is the [facility] Predict Outcome report for Oscar Roberts ,  1933. Predict is recommending SNF with expected length of stay of 16.0  days, and projected CG hours of 4.5  post stay. He significantly below his prior level  and would benefit from daily skilled nursing and therapy to increase strength and functional independence. Please call or email if you have any questions.       Have a good day, stay safe and healthy.     -Emile Patterson BA, RN  Telephonic SICC/TCC /[facility]  M: [phone]

## 2021-01-07 NOTE — PLAN OF CARE
Problem: Gas Exchange - Impaired  Goal: Absence of hypoxia  Outcome: Ongoing   Monitor for Resp. distress

## 2021-01-07 NOTE — PROGRESS NOTES
[address]  Speech Language Pathology    Date: 1/7/2021  Patient Name: Rashel Manley  YOB: 1933   AGE: 80 y.o. MRN: [id number]        Patient Not Available for Speech Therapy     Due to:  [] Testing  [] Hemodialysis  [] Cancelled by RN  [] Surgery   [] Intubation/Sedation/Pain Medication  [] Medical instability  [x] Other:  Spoke with RN pt. Cannot come off highflow at this time and is having no overt s/s of aspiration with PO at bedside. MBSS would be needed only to r/o silent aspiration. Holding MBSS at this time until pt. Off highflow and complete MBSS as able. Next scheduled treatment: once off highflow  Completed by:  Andrade Osborne M.S. [id number] [facility]

## 2021-01-07 NOTE — PROGRESS NOTES
Pulmonary Critical Care Progress Note  Daniel Hameed MD     Patient seen for the follow up of  Acute respiratory failure due to COVID-19 [facility])     Subjective:  Patient lying comfortably in the bed. No significant overnight events. Patient had bowel movement. He is tolerating orals but intake is not optimal.  He denies any chest pain. He thinks his shortness of breath is better. He has productive cough. Examination:  Vitals: BP (!) 95/43   Pulse 86   Temp 99 °F (37.2 °C) (Oral)   Resp 18   Ht 5' 6\" (1.676 m)   Wt 136 lb (61.7 kg)   SpO2 93%   BMI 21.95 kg/m²   General appearance:  alert and cooperative with exam, resting in bed  Neck: No JVD  Lungs: Moderate air exchange, occasional rhonchi  Heart: regular rate and rhythm, S1, S2 normal, no gallop  Abdomen: Soft, non tender, + BS  Extremities: no cyanosis or clubbing. No significant edema    LABs:  CBC:   No results for input(s): WBC, HGB, HCT, PLT in the last 72 hours. BMP:   No results for input(s): NA, K, CO2, BUN, CREATININE, LABGLOM, GLUCOSE in the last 72 hours. LIVER PROFILE:  No results for input(s): AST, ALT, LABALBU in the last 72 hours.   Radiology:  1/6/2021      Impression:  · Acute hypoxic respiratory failure  · COVID-19 pneumonia  · Mild pulmonary edema/acute on chronic systolic heart failure  · History of pulmonary fibrosis  · Former smoker, possible COPD  · Moderate pulmonary hypertension, RVSP 50 mmHg  · Mild thrombocytopenia, resolved    Recommendations:  · Oxygen via high flow nasal cannula, keep SPO2 greater than 92%  · BiPAP support as needed during the day and while asleep  · Monitor off of Precedex  · If FiO2/liter flow remains stable overnight, okay to proceed with video swallow study tomorrow  · Gentle IV hydration, per primary, monitor closely for fluid overload  · Seroquel 50 mg twice daily  · Incentive spirometry every hour while awake  · Prone as tolerated  · Decadron 6 mg daily  · Remdesivir · Albuterol and Ipratropium Q 4 hours and prn  · Budesonide and Brovana via nebulizer  · Midodrine 5 mg 3 times daily  · Diuresis with IV Lasix  · X-ray chest in am  · Labs: CBC and BMP in am  · DVT prophylaxis with low molecular weight heparin, monitor platelets  · PUD prophylaxis with Pepcid  · DNR CCA  · Discussed with RN  · Will follow with you    Electronically signed by     Vaughn House MD on 1/7/2021 at 11:25 AM  Pulmonary Critical Care and Sleep Medicine,  [facility]  Cell: [phone]  Office: [phone]

## 2021-01-07 NOTE — PROGRESS NOTES
[facility]  Office: [address], DO, Susan [place], DO, Neymar Lenardsh, DO, Jojo Boland Blood, DO, Mirian Mccord MD, Eliana Crump MD, Elida Huynh MD, Omi Ellington MD, Emerita Ruth MD, Mehreen Pepe MD, Dianelys Vines MD, Rodriguez Szymanski MD, Anuel Oliva MD, Jeppie Soulier, DO, Cristi Robb MD, Shari Caal MD, Robby Diaz, DO, Selvin Loyd MD,  Jazmine Adams, DO, Daylin Duggan MD, Akosua Tirado MD, Lopez Gutierrez, [facility], [address], [facility], Monika Sharp, CNP, Oliva Hoty, CNS, Philippe Read, CNP, Wilfred Miles, CNP, Leonela Kaur, CNP, Haylee Capone, CNP, Soren Lyons, CNP, JOSEPH MckeonC, Adilia Wild, [facility], Anurag Sarabia, CNP, Jony Mtz, CNP, Linh Lara, CNP, Kaiser Melissa, CNP, Elvis Severs, [facility]    Progress Note    1/7/2021    5:15 PM    Name:   Julian Sorenson  MRN:     [id number]     Acct:      [de-identified]   Room:   [address] [place] [address] Day:  8  Admit Date:  12/30/2020 10:47 AM    PCP:   Elizabeth Schneider MD  Code Status:  DNR-CCA    Subjective:     C/C:   Chief Complaint   Patient presents with    Shortness of Breath     pt states gradually gotten worse over the past few months     Interval History Status: improved. Patient was seen and evaluated at bedside this morning.     Patient continues to be on high flow nasal cannula at this time,  Needing 50 L, 45% FiO2  Patient feeling very dry,  9.9 L negative fluid balance  Had BM , feeling little better       Brief History: Patient is a resident at assisted living facility. Patient reports that the facility has recently had an outbreak of Covid. Patient reports over the past 3 days he has been experiencing exertional fatigue followed rapidly by a persistent cough with exertional dyspnea over the past 24 hours. Patient reports that the symptoms are present in between his ADLs and he has been having increasing difficulty with breathing throughout the day. Patient reports to the freestanding emergency department by EMS where he was found to be profoundly hypoxic and was started on supplemental oxygen by nonrebreather mask at 15 L. Initial lab testing was completed and finds patient is Covid positive. Patient is admitted to the hospital for acute respiratory failure with hypoxia and will be treated aggressively with standard Covid therapy. Review of Systems:     Constitutional:  negative for chills, fevers, sweats  Respiratory: Still coughing, shortness of breath  Cardiovascular:  negative for chest pain,   Gastrointestinal:  negative for abdominal pain, constipation, diarrhea, nausea, vomiting  Neurological:  negative for dizziness, headache    Medications: Allergies:     Allergies   Allergen Reactions    Bactrim     Other      Sweet potatoes     Prednisone Other (See Comments)     Trouble voiding       Current Meds:   Scheduled Meds:    QUEtiapine  50 mg Oral BID    furosemide  40 mg Intravenous Daily    midodrine  5 mg Oral TID     sodium chloride flush  10 mL Intravenous 2 times per day    ipratropium-albuterol  1 ampule Inhalation Q4H WA    enoxaparin  40 mg Subcutaneous Daily    aspirin  81 mg Oral Daily    Pirfenidone  3 capsule Oral TID WC    famotidine  40 mg Oral Daily    fluticasone  2 spray Each Nostril Daily    tamsulosin  0.4 mg Oral Daily    Arformoterol Tartrate  15 mcg Nebulization BID    budesonide  1 mg Nebulization BID    sodium chloride flush  10 mL Intravenous 2 times per day  dexamethasone  6 mg Oral Daily    Vitamin D  6,000 Units Oral Daily    zinc sulfate  50 mg Oral Daily     Continuous Infusions:    sodium chloride 50 mL/hr at 21 1652    dexmedetomidine (PRECEDEX) IV infusion Stopped (21 0500)    sodium chloride       PRN Meds: polyethylene glycol, sodium chloride flush, ALPRAZolam, calcium carbonate, albuterol, sodium chloride flush, potassium chloride **OR** potassium alternative oral replacement **OR** potassium chloride, magnesium sulfate, promethazine **OR** ondansetron, nicotine, acetaminophen **OR** acetaminophen, sodium chloride, guaiFENesin-dextromethorphan, sodium chloride    Data:     Past Medical History:   has a past medical history of Asthma, COPD (chronic obstructive pulmonary disease) ([facility] and Pulmonary fibrosis ([facility] Social History:   reports that he quit smoking about 53 years ago. He has never used smokeless tobacco. He reports that he does not drink alcohol or use drugs. Family History:   Family History   Problem Relation Age of Onset    Heart Attack Mother     Heart Attack Father        Vitals:  /66   Pulse 78   Temp 98.9 °F (37.2 °C) (Oral)   Resp 24   Ht 5' 6\" (1.676 m)   Wt 136 lb (61.7 kg)   SpO2 90%   BMI 21.95 kg/m²   Temp (24hrs), Av.9 °F (37.2 °C), Min:98.7 °F (37.1 °C), Max:99 °F (37.2 °C)    No results for input(s): POCGLU in the last 72 hours. I/O (24Hr): Intake/Output Summary (Last 24 hours) at 2021 1715  Last data filed at 2021 1500  Gross per 24 hour   Intake 2979 ml   Output 1800 ml   Net 1179 ml       Labs:  Hematology:  Recent Labs     21  1906 21  1555   CRP 40.7*  --    DDIMER 0.65* 0.60*     Chemistry:  No results for input(s): NA, K, CL, CO2, GLUCOSE, BUN, CREATININE, MG, ANIONGAP, LABGLOM, GFRAA, CALCIUM, CAION, PHOS, PSA, PROBNP, TROPHS, CKTOTAL, CKMB, CKMBINDEX, MYOGLOBIN, DIGOXIN, LACTACIDWB in the last 72 hours. No results for input(s): PROT, LABALBU, LABA1C, B2QFDVP, V6ROEET, FT4, TSH, AST, ALT, LDH, GGT, ALKPHOS, LABGGT, BILITOT, BILIDIR, AMMONIA, AMYLASE, LIPASE, LACTATE, CHOL, HDL, LDLCHOLESTEROL, CHOLHDLRATIO, TRIG, VLDL, PJB47VL, PHENYTOIN, PHENYF, URICACID, POCGLU in the last 72 hours. ABG:No results found for: POCPH, PHART, PH, POCPCO2, AYA3UJY, PCO2, POCPO2, PO2ART, PO2, POCHCO3, JFH1HAT, HCO3, NBEA, PBEA, BEART, BE, THGBART, THB, ZGS9HNH, DUCF5YMY, F7EGORQX, O2SAT, FIO2  Lab Results   Component Value Date/Time    [facility] 12/30/2020 12:45 PM     Lab Results   Component Value Date/Time    CULTURE NO GROWTH 6 DAYS 12/30/2020 12:45 PM       Radiology:  Xr Chest Portable    Result Date: 1/1/2021  No significant interval change. Diffuse bilateral patchy airspace opacity and interstitial thickening. Xr Chest Portable    Result Date: 12/30/2020  Patchy diffuse bilateral pulmonary opacification most consistent with multifocal pneumonia including atypical viral pneumonia.        Physical Examination:        General appearance:  alert, cooperative and no distress  Mental Status:  oriented to person, place and time and normal affect  Lungs: Bilateral coarse breath sounds  Heart:  regular rate and rhythm, no murmur  Abdomen:  soft, NT  Extremities:  no edema, redness,  Skin:  no gross lesions, rashes, induration    Assessment:        Hospital Problems           Last Modified POA    * (Principal) Acute respiratory failure due to COVID-19 ([address] 1/6/2021 Yes    COPD (chronic obstructive pulmonary disease) ([address] 12/30/2020 Yes    Dyslipidemia 12/30/2020 Yes    Pulmonary interstitial fibrosis ([address] 12/30/2020 Yes    Pneumonia due to COVID-19 virus 1/6/2021 Yes    Gastroesophageal reflux disease without esophagitis 12/30/2020 Yes    Former smoker 12/30/2020 Yes    Benign prostatic hyperplasia 12/30/2020 Yes    Anxiety 12/30/2020 Yes    Acute-on-chronic respiratory failure ([address] 12/30/2020 Yes COVID-19 12/30/2020 Yes    SIRS (systemic inflammatory response syndrome) ([facility] 12/30/2020 Yes    Sepsis due to COVID-19 ([facility] 1/6/2021 Yes          Plan:        1. Acute respiratory failure, sepsis due to Covid, status post remdesivir, dexamethasoneday 8/10, S/p convalescent plasma  ,  2. Patient feeling very dry, 5.7L negative, on NS  50 mL/h, will stop ,  3. Heated high flow O2 as well as adult BiPAP protocol at respiratory discretion  4. Midodrine added due to hypotension  5. Maintain indwelling urinary catheter for BPH with urinary retention as well as Flomax  6. PPI for GERD, Lipitor for dyslipidemia  7. Anxiety management with home medication as well as Xanax  8. Consult pulmonology  9. Add procalcitonin, blood cultures, trend lactate, trend troponin  10.  Lovenox 30 twice daily for anticoagulation with Sagar Worthy MD  1/7/2021  5:15 PM

## 2021-01-07 NOTE — PROGRESS NOTES
Comprehensive Nutrition Assessment    Type and Reason for Visit:  Reassess    Nutrition Recommendations/Plan:   1. Continue current diet and supplements for now. 2. Follow SLP recommendations for diet and liquids consistency. 3. Monitor PO intakes, weight and labs. Nutrition Assessment:  Patient improvinbg nutritionally, consuming % of meals and supplements. However, patient don't like Full Liquid diet. [facility] Continues on high flow oxygen, feels very weak and failed NC trial. Diet and oral supplements will likely continue until patient can be evaluated for swallowing. Monitor diet progression, p.o. intakes and labs. Malnutrition Assessment:  Malnutrition Status: At risk for malnutrition (Comment)    Context:  Acute Illness     Findings of the 6 clinical characteristics of malnutrition:  Energy Intake:  1 - 75% or less of estimated energy requirements for 7 or more days  Weight Loss:  Unable to assess     Body Fat Loss:  Unable to assess     Muscle Mass Loss:  Unable to assess    Fluid Accumulation:  No significant fluid accumulation Extremities   Strength:  Not Performed    Estimated Daily Nutrient Needs:  Energy (kcal):  8625-6978 kcal based on 28-30 kcal/kg; Weight Used for Energy Requirements:  Current     Protein (g):  80-93 gm based on 1.3-1.5 gm/kg; Weight Used for Protein Requirements:  Current        Fluid (ml/day):   ; Method Used for Fluid Requirements:         Nutrition Related Findings:  No edema. Bowel sounds active.  COVID-19      Wounds:  None       Current Nutrition Therapies:    DIET FULL LIQUID; Mildly Thick (Nectar)  Dietary Nutrition Supplements: Standard High Calorie Oral Supplement, Frozen Oral Supplement    Anthropometric Measures:  · Height: 5' 6\" (167.6 cm)  · Current Body Weight: 136 lb (61.7 kg)   · Ideal Body Weight: 142 lbs; % Ideal Body Weight 95.8 %   · BMI: 22 · BMI Categories: Underweight (BMI less than 22) age over 72       Nutrition Diagnosis:   · Inadequate protein-energy intake related to impaired respiratory function, swallowing difficulty as evidenced by (Full Liquid Diet)    Nutrition Interventions:   Food and/or Nutrient Delivery:  Continue Current Diet, Continue Oral Nutrition Supplement  Nutrition Education/Counseling:  Education not indicated   Coordination of Nutrition Care:  Continue to monitor while inpatient    Goals:  PO intakes are greater than 50% at meals       Nutrition Monitoring and Evaluation:   Food/Nutrient Intake Outcomes:  Food and Nutrient Intake, Diet Advancement/Tolerance, Supplement Intake  Physical Signs/Symptoms Outcomes:  Biochemical Data, Fluid Status or Edema, Skin, Weight, Chewing or Swallowing     Discharge Planning:     Too soon to determine       Some areas of assessment may be incomplete due to COVID-19 precautions    Nuria Maloney RD, LD  Office phone [phone]

## 2021-01-07 NOTE — PROGRESS NOTES
Occupational Therapy  Facility/Department: [facility] ICU  Daily Treatment Note  NAME: Page Weston  : 1933  MRN: [id number]    Date of Service: 2021     RN Ulices Trimble reports patient is medically stable for therapy treatment this date. Chart reviewed prior to treatment and patient is agreeable for therapy. All lines intact and patient positioned comfortably at end of treatment. All patient needs addressed prior to ending therapy session. Discharge Recommendations:  Subacute/Skilled Nursing Facility  OT Equipment Recommendations  ADL Assistive Devices: Sock-Aid Soft;Reacher; Toileting - 3-in-1 Commode;Long-handled Sponge;Emergency Alert System;Grab Bars - shower;Grab Bars - toilet;Long-handled Shoe [place]    Assessment   Performance deficits / Impairments: Decreased functional mobility ; Decreased ADL status; Decreased strength;Decreased safe awareness;Decreased cognition;Decreased endurance;Decreased balance;Decreased high-level IADLs;Decreased fine motor control;Decreased coordination;Decreased posture  Assessment: Pt would benefit from continued skilled OT services to Maximize I and safety during functional task to return home at [facility] with assist.  Prognosis: Fair  Decision Making: High Complexity  OT Education: OT Role;Plan of Care;ADL Adaptive Strategies;Transfer Training;Energy Conservation  Patient Education: safety in function, fall prevention/call light use, pursed lip breathing/relaxation tech, recommedations for continued therapy, proper bed mobility tech. Barriers to Learning: cognitive deficits  REQUIRES OT FOLLOW UP: Yes  Activity Tolerance  Activity Tolerance: Patient Tolerated treatment well;Patient limited by fatigue  Activity Tolerance: poor plus; Pt is limited by resp status and anxiety  Safety Devices  Safety Devices in place:  Yes Type of devices: Gait belt; All fall risk precautions in place;Nurse notified; Bed alarm in place; Left in bed(elevated B heels off bed with pillow for increased comfort/skin integrity)         Patient Diagnosis(es): The primary encounter diagnosis was COVID-19. Diagnoses of Pulmonary fibrosis ([facility] [address] and Pneumonia due to organism were also pertinent to this visit. has a past medical history of Asthma, COPD (chronic obstructive pulmonary disease) ([facility] [address] and Pulmonary fibrosis ([facility] has a past surgical history that includes pre-malignant / benign skin lesion excision (09/12/2017). Restrictions  Restrictions/Precautions  Restrictions/Precautions: General Precautions, Contact Precautions  Required Braces or Orthoses?: No  Position Activity Restriction  Other position/activity restrictions: droplet + for +covid-19, DNR-CCA, telemetry, up with assist, hi flow, continuous pulse ox & wired telemetry, LUE IV     Subjective   General  Chart Reviewed: Yes  Patient assessed for rehabilitation services?: Yes  Response to previous treatment: Patient with no complaints from previous session  Family / Caregiver Present: No  Subjective  Subjective: Pt agreeable to OT tx session, appears anxious at times. Vital Signs  Patient Currently in Pain: Denies     Orientation  Orientation  Overall Orientation Status: Within Functional Limits     Objective    ADL  Grooming: Stand by assistance;Setup(to complete oral care and hair brushing seated in bedside chair)  UE Dressing: Maximum assistance;Setup(to doff/[facility] gown)  LE Dressing: Dependent/Total(to doff/[facility] socks seated on bedside chair)  Toileting: Dependent/Total(for hygiene after small bowl movement standing with RW and x2 staff asssit)  Additional Comments: Pt is DEP with all ADLs at this time d/t x2 staff assist when up. Patient is limited with respiratory status and high anxiety.         Balance  Sitting Balance: Stand by assistance Standing Balance: Moderate assistance(x2 staff assist for safety/balance/line management)  Standing Balance  Time: standing hua < 45 seconds with RW  Comment: Pt became anxious when standing with RW x2 staff assist, impulsivly walking towards bed before lines were ready to be moved. Pt required MAX verbal instruction/tactile assist for slowing down movements, scanning room, pursed lip breathing, hand placement on RW and awareness/assist with lines all tech to increase safety and reduce fall risk. Functional Mobility  Functional - Mobility Device: Rolling Walker  Activity: Other(bed side chair to bed)  Assist Level: Moderate assistance(x2)  Functional Mobility Comments: Pt required MAX verbal instruction/tactile assist for slowin down movements, scanning room, pursed lip breathing, hand placement on RW and awareness/assist with lines all to increase safety. Bed mobility  Supine to Sit: Moderate assistance;2 Person assistance;Maximum assistance  Scooting: Moderate assistance;2 Person assistance;Maximal assistance  Comment: Pt requires Max verbal instruction/tactile cues for hand placement on bed rail, proper bed mobility tech, pursed lip breathing and awareness/assist with lines all to increase safety. Transfers  Stand Step Transfers: 2 Person assistance; Moderate assistance(with RW x2 staff assist for safety and balance)  Sit to stand: Moderate assistance;2 Person assistance  Stand to sit: Moderate assistance;2 Person assistance  Transfer Comments: Pt required Max verbal cues/tactile assist for RW safety, hand placement on RW, upright posture, pursed lip breathing, nose over toes, weight shifting and awareness/assist with lines all to increase safety. Cognition  Overall Cognitive Status: Exceptions  Arousal/Alertness: Appropriate responses to stimuli  Following Commands: Follows multistep commands with repitition  Attention Span: Attends with cues to redirect; Difficulty dividing attention Memory: Decreased short term memory;Decreased recall of recent events;Decreased recall of precautions  Safety Judgement: Decreased awareness of need for safety;Decreased awareness of need for assistance  Problem Solving: Decreased awareness of errors;Assistance required to correct errors made;Assistance required to implement solutions;Assistance required to identify errors made;Assistance required to generate solutions  Insights: Decreased awareness of deficits  Initiation: Requires cues for all  Sequencing: Requires cues for all  Cognition Comment: Pt appears very anxious throughout OT tx session. Plan   Plan  Times per week: 4-5x/wk 1x/day as hua  Current Treatment Recommendations: Strengthening, Balance Training, Functional Mobility Training, Endurance Training, Equipment Evaluation, Education, & procurement, Patient/Caregiver Education & Training, Safety Education & Training, Self-Care / ADL           AM-PAC Score: 12          Goals  Short term goals  Time Frame for Short term goals: By discharge, pt to demo  Short term goal 1: bed mobility to Min A of 1 with use of bed rail as needed. Short term goal 2: UB ADLs to SBA/LB ADLs to Min A with use of AD/AE as needed. Short term goal 3: increased BUE strength by 1/2 grade/I with simple BUE HEP to assist with self care tasks with use of handouts as needed  Short term goal 4: ADL transfers and functional mobility to Min A of 1 with use of AD as needed. Short term goal 5: toileting to Min A with use of AD/grab bars as needed. Long term goals  Long term goal 1: Pt to demo increased standing hua > 5 min with Min A using AD as needed to reduce fall risk during self care tasks. Long term goal 2: Pt/caregivers to be I with fall prevention/ EC/WS, recommendations for AE/DME and pressure relief with use of handouts as needed. Patient Goals   Patient goals : To feel better and go home!

## 2021-01-07 NOTE — PROGRESS NOTES
1906 01/07/21  1555   CRP 40.7*  --    DDIMER 0.65* 0.60*     Lab Results   Component Value Date    COVID19 DETECTED 12/30/2020    COVID19 Not Detected 11/21/2020     No results found for requested labs within last 30 days. CULTURES:    Patient is not on antibiotics    Anticoagulants:     No results for input(s): HGB, HCT, INR, PLT in the last 72 hours. Patient on DVT prophylaxis with Lovenox 40mg daily      Cardiac:     No components found for: TROPONIN,  HIGH SENSITIVITY  Lab Results   Component Value Date     12/30/2020       No interventions identified. Neurology/Psychology:     Seroquel 50mg BID    Endocrine:     Lab Results   Component Value Date    TSH 0.45 11/12/2020       See glucose panel. Glucose Control:     Lab Results   Component Value Date    LABA1C 5.6 12/17/2012       No recent labs. No interventions identified. Renal Function:     CrCl cannot be calculated (This lab value cannot be used to calculate CrCl because it is not a number: <0.40). No results for input(s): CREATININE in the last 72 hours. Intake/Output Summary (Last 24 hours) at 1/7/2021 1638  Last data filed at 1/7/2021 1500  Gross per 24 hour   Intake 3679 ml   Output 1800 ml   Net 1879 ml     Date 01/07/21 0000 - 01/07/21 2359   Shift 2683-3896 8232-7835 7155-3654 24 Hour Total   INTAKE   P.O. 120 1600  1720   I. V.(mL/kg/hr)  1135(2.3)  1135   Blood  0  0   Shift Total(mL/kg) 120(1.9) 6625(47.6)  7596(34.4)   OUTPUT   Urine(mL/kg/hr) 550(1.1) 750(1.5)  1300   Shift Total(mL/kg) 550(8.9) 750(12.2)  1300(21.1)   Weight (kg) 61.7 61.7 61.7 61.7       No interventions identified. IV -> PO:     No interventions identified. Other Interventions:     No interventions identified. Maame Phillips.  Sheryle Gab, PharmD, BCPS  Emergency Department and Critical Care Pharmacist  1/7/2021  4:38 PM

## 2021-01-07 NOTE — PROGRESS NOTES
Patient refusing to wear Bipap at this time. Placed on right side as patient states unable to prone at this time to sleep.

## 2021-01-07 NOTE — PROGRESS NOTES
Physical Therapy  Facility/Department: [facility] ICU  Daily Treatment Note  NAME: Julian Sorenson  : 1933  MRN: [id number]    Date of Service: 2021    Discharge Recommendations:  Subacute/Skilled Nursing Facility        Assessment   Body structures, Functions, Activity limitations: Decreased functional mobility ; Decreased endurance;Decreased strength;Decreased balance  Assessment: Pt with deficits of bed mobility, transfers, ambulation, balance, Poor safety awareness and endurance this session. Pt requires 2 assist for safe bed mboility,  transfers & gait,  With current deficits, recommend SNF at D/C to return to baseline function and a safe D/C back to home environment. Pt requires continued PT to maximize independence with functional mobility, balance, safety awareness & activity tolerance. Prognosis: Good  Decision Making: Medium Complexity  Exam: functional mobility, activity tolerance, Balance, & [employer] AM-PAC 6 Clicks Basic Mobility  Clinical Presentation: evolving  PT Education: PT Role;Plan of Care;General Safety; Energy Conservation  Patient Education: Ed optimal breathing techniques, ex's with optimal breathing, posture & positioning for optimal breathing  REQUIRES PT FOLLOW UP: Yes  Activity Tolerance  Activity Tolerance: Patient limited by endurance  Activity Tolerance: improving saO2 89-93% on hi flwo with activity     Patient Diagnosis(es): The primary encounter diagnosis was COVID-19. Diagnoses of Pulmonary fibrosis ([facility] [address] and Pneumonia due to organism were also pertinent to this visit. has a past medical history of Asthma, COPD (chronic obstructive pulmonary disease) ([facility] [address] and Pulmonary fibrosis ([facility] [address] has a past surgical history that includes pre-malignant / benign skin lesion excision (2017).     Restrictions  Restrictions/Precautions  Restrictions/Precautions: General Precautions, Contact Precautions  Required Braces or Orthoses?: No  Position Activity Restriction Other position/activity restrictions: droplet + for +covid-19, DNR-CCA, telemetry, up with assist, hi flow, continuous pulse ox & wired telemetry, LUE IV  Subjective   General  Chart Reviewed: Yes  Response To Previous Treatment: Patient with no complaints from previous session. Family / Caregiver Present: No  Subjective  Subjective: Pt agreeable to PT  General Comment  Comments: RN sourav PT  Pain Screening  Patient Currently in Pain: Denies  Vital Signs  BP Location: Left upper arm  Level of Consciousness: Alert (0)  Patient Currently in Pain: Denies       Orientation  Orientation  Overall Orientation Status: Within Normal Limits  Cognition   Cognition  Overall Cognitive Status: Exceptions  Arousal/Alertness: Appropriate responses to stimuli  Following Commands: Follows multistep commands with repitition  Attention Span: Attends with cues to redirect; Difficulty dividing attention  Memory: Decreased short term memory;Decreased recall of recent events;Decreased recall of precautions  Safety Judgement: Decreased awareness of need for safety;Decreased awareness of need for assistance  Problem Solving: Decreased awareness of errors;Assistance required to correct errors made;Assistance required to implement solutions;Assistance required to identify errors made;Assistance required to generate solutions  Insights: Decreased awareness of deficits  Initiation: Requires cues for all  Sequencing: Requires cues for all  Cognition Comment: Pt appears very anxious throughout OT tx session. Objective   Bed mobility  Supine to Sit: Moderate assistance;2 Person assistance;Maximum assistance  Scooting: Moderate assistance;2 Person assistance;Maximal assistance  Comment: Max verbal cues/tactile assist for reach & hand placement onto bed rail, proper bed mobility tech & to position in correct body alignment and awareness/assist with lines & extended tinme to complete  Transfers  Sit to Stand:  Moderate Assistance;2 Person Assistance Stand to sit: Moderate Assistance;2 Person Assistance  Bed to Chair: Moderate assistance;2 Person Assistance  Stand Pivot Transfers: Moderate Assistance;2 Person Assistance  Lateral Transfers: Moderate Assistance;2 Person Assistance  Comment: Ed + tactile assist on correct use of upper body for safe sit/stand, 2nd assist needed for safety & MULTIPLE Lines  Ambulation  Ambulation?: Yes  Ambulation 1  Surface: level tile  Device: Rolling Walker  Other Apparatus: O2(Hi flow)  Assistance: Moderate assistance;2 Person assistance  Quality of Gait: short shuffle steps  Distance: 2ft to EOB, turning& then 3 ft to [facility]  Comments: POOR safety awareness for lines  Stairs/Curb  Stairs?: No     Balance  Posture: Fair  Sitting - Static: Good  Sitting - Dynamic: Good;-  Standing - Static: Fair;+(R/W)  Standing - Dynamic: Fair(R/W)  Exercises  Comments: practice of breathing techniques, LE ex's with optimal breathing, posture & positioning for optimal breathing    All lines intact, call light within reach, and patient positioned comfortably at end of treatment. All patient needs addressed prior to ending therapy session. G-Code     OutComes Score                                                     -PAC Score  -PAC Inpatient Mobility Raw Score : 13 (01/07/21 1431)  AM-PAC Inpatient T-Scale Score : 36.74 (01/07/21 1431)  Mobility Inpatient CMS 0-100% Score: 64.91 (01/07/21 1431)  Mobility Inpatient CMS G-Code Modifier : CL (01/07/21 1431)          Goals  Short term goals  Time Frame for Short term goals: 12 treatments  Short term goal 1: Inc bed-mobility & transfers to independent to enable pt to safely get in/OOB  Short term goal 2: Inc gait to amb 150ft or > indep w/ RW to enable pt to return to previous level of independence  Short term goal 3:  Inc strength to [address] standing balance to good with device to facilitate pt independence for performance of ADL's & functional mobility, & reduce fall risk; Short term goal 4: Pt able to tolerate 30-40 min of activity to include 15-20 reps of ex & functional mobility including 5 minutes of standing to facilitate activity tolerance to [facility]; Short term goal 5: Ed use of incentive spirometer & optimal breathing techniques, ex's with incorporation of breathing techniques, posture & positioning for optimal breathing &  mental training ex's & issue written pt education  Patient Goals   Patient goals : Back to Rehab then to [facility].     Plan    Plan  Times per week: 1-2x/day,6-7 days/week  Current Treatment Recommendations: Strengthening, Balance Training, Endurance Training, Gait Training, Functional Mobility Training, Transfer Training, Stair training  Safety Devices  Type of devices: Nurse notified, Call light within reach, Left in bed, Gait belt  Restraints  Initially in place: No     Therapy Time   Individual Concurrent Group Co-treatment   Time In 1400         Time Out 1431         Minutes Caty PT

## 2021-01-08 ENCOUNTER — APPOINTMENT (OUTPATIENT)
Dept: GENERAL RADIOLOGY | Age: 86
DRG: 871 | End: 2021-01-08
Payer: MEDICARE

## 2021-01-08 LAB
ABSOLUTE EOS #: 0.05 K/UL (ref 0–0.4)
ABSOLUTE IMMATURE GRANULOCYTE: 0.59 K/UL (ref 0–0.3)
ABSOLUTE LYMPH #: 0.86 K/UL (ref 1–4.8)
ABSOLUTE MONO #: 0.86 K/UL (ref 0.2–0.8)
ANION GAP SERPL CALCULATED.3IONS-SCNC: 5 MMOL/L (ref 9–17)
BASOPHILS # BLD: 0 %
BASOPHILS ABSOLUTE: 0 K/UL (ref 0–0.2)
BUN BLDV-MCNC: 18 MG/DL (ref 8–23)
BUN/CREAT BLD: ABNORMAL (ref 9–20)
CALCIUM SERPL-MCNC: 8.8 MG/DL (ref 8.6–10.4)
CHLORIDE BLD-SCNC: 87 MMOL/L (ref 98–107)
CO2: 41 MMOL/L (ref 20–31)
CREAT SERPL-MCNC: <0.4 MG/DL (ref 0.7–1.2)
DIFFERENTIAL TYPE: ABNORMAL
EOSINOPHILS RELATIVE PERCENT: 1 % (ref 1–4)
GFR AFRICAN AMERICAN: ABNORMAL ML/MIN
GFR NON-AFRICAN AMERICAN: ABNORMAL ML/MIN
GFR SERPL CREATININE-BSD FRML MDRD: ABNORMAL ML/MIN/{1.73_M2}
GFR SERPL CREATININE-BSD FRML MDRD: ABNORMAL ML/MIN/{1.73_M2}
GLUCOSE BLD-MCNC: 123 MG/DL (ref 70–99)
HCT VFR BLD CALC: 33.2 % (ref 40.7–50.3)
HEMOGLOBIN: 10.7 G/DL (ref 13–17)
IMMATURE GRANULOCYTES: 11 %
LYMPHOCYTES # BLD: 16 % (ref 24–44)
MCH RBC QN AUTO: 30.2 PG (ref 25.2–33.5)
MCHC RBC AUTO-ENTMCNC: 32.2 G/DL (ref 28.4–34.8)
MCV RBC AUTO: 93.8 FL (ref 82.6–102.9)
MONOCYTES # BLD: 16 % (ref 1–7)
NRBC AUTOMATED: 0 PER 100 WBC
PDW BLD-RTO: 14.8 % (ref 11.8–14.4)
PLATELET # BLD: 85 K/UL (ref 138–453)
PLATELET ESTIMATE: ABNORMAL
PMV BLD AUTO: 11.4 FL (ref 8.1–13.5)
POTASSIUM SERPL-SCNC: 4.9 MMOL/L (ref 3.7–5.3)
RBC # BLD: 3.54 M/UL (ref 4.21–5.77)
RBC # BLD: ABNORMAL 10*6/UL
SEG NEUTROPHILS: 56 % (ref 36–66)
SEGMENTED NEUTROPHILS ABSOLUTE COUNT: 3.04 K/UL (ref 1.8–7.7)
SODIUM BLD-SCNC: 133 MMOL/L (ref 135–144)
WBC # BLD: 5.4 K/UL (ref 3.5–11.3)
WBC # BLD: ABNORMAL 10*3/UL

## 2021-01-08 PROCEDURE — 6360000002 HC RX W HCPCS: Performed by: NURSE PRACTITIONER

## 2021-01-08 PROCEDURE — 6360000002 HC RX W HCPCS: Performed by: INTERNAL MEDICINE

## 2021-01-08 PROCEDURE — 6370000000 HC RX 637 (ALT 250 FOR IP): Performed by: NURSE PRACTITIONER

## 2021-01-08 PROCEDURE — 74230 X-RAY XM SWLNG FUNCJ C+: CPT

## 2021-01-08 PROCEDURE — 6370000000 HC RX 637 (ALT 250 FOR IP): Performed by: INTERNAL MEDICINE

## 2021-01-08 PROCEDURE — 36415 COLL VENOUS BLD VENIPUNCTURE: CPT

## 2021-01-08 PROCEDURE — 80048 BASIC METABOLIC PNL TOTAL CA: CPT

## 2021-01-08 PROCEDURE — 94761 N-INVAS EAR/PLS OXIMETRY MLT: CPT

## 2021-01-08 PROCEDURE — 94640 AIRWAY INHALATION TREATMENT: CPT

## 2021-01-08 PROCEDURE — 97110 THERAPEUTIC EXERCISES: CPT

## 2021-01-08 PROCEDURE — 85025 COMPLETE CBC W/AUTO DIFF WBC: CPT

## 2021-01-08 PROCEDURE — 92611 MOTION FLUOROSCOPY/SWALLOW: CPT

## 2021-01-08 PROCEDURE — 99232 SBSQ HOSP IP/OBS MODERATE 35: CPT | Performed by: INTERNAL MEDICINE

## 2021-01-08 PROCEDURE — 2000000000 HC ICU R&B

## 2021-01-08 PROCEDURE — 97530 THERAPEUTIC ACTIVITIES: CPT

## 2021-01-08 PROCEDURE — 2580000003 HC RX 258: Performed by: INTERNAL MEDICINE

## 2021-01-08 PROCEDURE — 2700000000 HC OXYGEN THERAPY PER DAY

## 2021-01-08 PROCEDURE — 71045 X-RAY EXAM CHEST 1 VIEW: CPT

## 2021-01-08 PROCEDURE — 97535 SELF CARE MNGMENT TRAINING: CPT

## 2021-01-08 RX ORDER — BUDESONIDE 0.5 MG/2ML
0.5 INHALANT ORAL 4 TIMES DAILY
Status: DISCONTINUED | OUTPATIENT
Start: 2021-01-09 | End: 2021-01-14 | Stop reason: HOSPADM

## 2021-01-08 RX ADMIN — IPRATROPIUM BROMIDE AND ALBUTEROL SULFATE 1 AMPULE: .5; 3 SOLUTION RESPIRATORY (INHALATION) at 14:29

## 2021-01-08 RX ADMIN — FUROSEMIDE 40 MG: 10 INJECTION, SOLUTION INTRAMUSCULAR; INTRAVENOUS at 09:00

## 2021-01-08 RX ADMIN — BUDESONIDE 500 MCG: 0.5 SUSPENSION RESPIRATORY (INHALATION) at 06:24

## 2021-01-08 RX ADMIN — TAMSULOSIN HYDROCHLORIDE 0.4 MG: 0.4 CAPSULE ORAL at 21:21

## 2021-01-08 RX ADMIN — SODIUM CHLORIDE, PRESERVATIVE FREE 10 ML: 5 INJECTION INTRAVENOUS at 09:00

## 2021-01-08 RX ADMIN — IPRATROPIUM BROMIDE AND ALBUTEROL SULFATE 1 AMPULE: .5; 3 SOLUTION RESPIRATORY (INHALATION) at 06:25

## 2021-01-08 RX ADMIN — QUETIAPINE FUMARATE 50 MG: 25 TABLET ORAL at 21:21

## 2021-01-08 RX ADMIN — BUDESONIDE 1000 MCG: 0.5 SUSPENSION RESPIRATORY (INHALATION) at 18:16

## 2021-01-08 RX ADMIN — DEXAMETHASONE 6 MG: 4 TABLET ORAL at 09:00

## 2021-01-08 RX ADMIN — ARFORMOTEROL TARTRATE 15 MCG: 15 SOLUTION RESPIRATORY (INHALATION) at 06:25

## 2021-01-08 RX ADMIN — IPRATROPIUM BROMIDE AND ALBUTEROL SULFATE 1 AMPULE: .5; 3 SOLUTION RESPIRATORY (INHALATION) at 18:16

## 2021-01-08 RX ADMIN — ASPIRIN 81 MG: 81 TABLET, COATED ORAL at 09:00

## 2021-01-08 RX ADMIN — FAMOTIDINE 40 MG: 20 TABLET, FILM COATED ORAL at 21:21

## 2021-01-08 RX ADMIN — IPRATROPIUM BROMIDE AND ALBUTEROL SULFATE 1 AMPULE: .5; 3 SOLUTION RESPIRATORY (INHALATION) at 10:33

## 2021-01-08 RX ADMIN — QUETIAPINE FUMARATE 50 MG: 25 TABLET ORAL at 09:00

## 2021-01-08 RX ADMIN — MIDODRINE HYDROCHLORIDE 5 MG: 5 TABLET ORAL at 09:00

## 2021-01-08 RX ADMIN — MIDODRINE HYDROCHLORIDE 5 MG: 5 TABLET ORAL at 17:30

## 2021-01-08 RX ADMIN — MIDODRINE HYDROCHLORIDE 5 MG: 5 TABLET ORAL at 12:00

## 2021-01-08 NOTE — PROGRESS NOTES
ICU Pharmacist Progress Note   Tyler Ramírez is here for   Chief Complaint   Patient presents with    Shortness of Breath     pt states gradually gotten worse over the past few months     Bactrim, Other, and Prednisone    Consults:    IP CONSULT TO PULMONOLOGY  IP CONSULT TO PHARMACY    RECOMMENDATIONS:     1. No interventions identified. ASSESSMENT:     Hospital Problems           Last Modified POA    * (Principal) Acute respiratory failure due to COVID-19 [facility]) 2021 Yes    COPD (chronic obstructive pulmonary disease) ([facility] [address] 2020 Yes    Dyslipidemia 2020 Yes    Pulmonary interstitial fibrosis ([facility] [address] 2020 Yes    Pneumonia due to COVID-19 virus 2021 Yes    Gastroesophageal reflux disease without esophagitis 2020 Yes    Former smoker 2020 Yes    Benign prostatic hyperplasia 2020 Yes    Anxiety 2020 Yes    Acute-on-chronic respiratory failure ([facility] [address] 2020 Yes    COVID-19 2020 Yes    SIRS (systemic inflammatory response syndrome) ([facility] [address] 2020 Yes    Sepsis due to COVID-19 ([facility] [address] 2021 Yes          Respiratory/Critical Care:     Patient Intubated - No    ABG:No results found for: POCPH, PHART, PH, POCPCO2, IQR3YNU, PCO2, POCPO2, PO2ART, PO2, POCHCO3, ZZE5SLQ, HCO3, NBEA, PBEA, BEART, BE, THGBART, THB, DGT6ESW, HGSX8AKP, I5QQXXAR, O2SAT, FIO2    FASTHUGBID: N/A    Patient on Pressor Support : No    Patient on heated high flow 35L @ 50%  Pulmicort BID  Brovana BID  Patient off of Precedex. No results for input(s): TRIG in the last 72 hours. Antimicrobial Stewardship:     Recent Labs     21  0326   WBC 5.4     No results for input(s): PROCAL in the last 72 hours. No results for input(s): LACTA in the last 72 hours. Temp (72hrs), Av.5 °F (36.9 °C), Min:97.7 °F (36.5 °C), Max:99 °F (37.2 °C)    CrCl cannot be calculated (This lab value cannot be used to calculate CrCl because it is not a number: <0.40).     Recent Labs     21

## 2021-01-08 NOTE — PROCEDURES
Patient is a resident at assisted living facility. Patient reports that the facility has recently had an outbreak of Covid. Patient reports over the past 3 days he has been experiencing exertional fatigue followed rapidly by a persistent cough with exertional dyspnea over the past 24 hours. Patient reports that the symptoms are present in between his ADLs and he has been having increasing difficulty with breathing throughout the day. Patient reports to the freestanding emergency department by EMS where he was found to be profoundly hypoxic and was started on supplemental oxygen by nonrebreather mask at 15 L. Initial lab testing was completed and finds patient is Covid positive. Patient is admitted to the hospital for acute respiratory failure with hypoxia and will be treated aggressively with standard Covid therapy          Behavior/Cognition/Vision/Hearing:  Behavior/Cognition: Alert; Cooperative  Vision: Impaired  Hearing: Exceptions to [facility]    Impressions:  Patient presents with  safe swallow for Regular diet with thin liquids as evidenced by no observed aspiration noted with consistencies tested. Recommend small sips and bites, only feed when alert and awake and upright at 90 degrees for all PO intake. Recommend close monitoring for overt/clinical s/s of aspiration and D/C PO intake and complete Modified Barium Swallow Study should they occur. Results and recommendations reported to RN. Patient Position: Lateral and Patient Degrees: 90      Consistencies Administered: Dysphagia Soft and Bite-Sized (Dysphagia III); Dysphagia Pureed (Dysphagia I); Thin straw    Compensatory Swallowing Strategies Attempted: Eat/Feed slowly;Upright as possible for all oral intake;Small bites/sips  Postural Changes and/or Swallow Maneuvers Trialed: Upright 90 degrees      Recommended Diet:  Solid consistency: Regular  Liquid consistency:  Thin  Liquid administration via: Straw;Cup    Medication administration: PO Safe Swallow Protocol:  Supervision: Distant  Compensatory Swallowing Strategies: Alternate solids and liquids;Eat/Feed slowly;Upright as possible for all oral intake;Small bites/sips    Recommendations/Treatment  Requires SLP Intervention: No        D/C Recommendations: To be determined  Postural Changes and/or Swallow Maneuvers: Upright 90 degrees      Recommended Exercises:    Therapeutic Interventions: Diet tolerance monitoring         Education: Images and recommendations were reviewed with pt following this exam.   Patient Education: yes  Patient Education Response: Verbalizes understanding    Prognosis  Prognosis for safe diet advancement: good      Goals:    Long Term:     To Maximize safety with intake, optimize nutrition/hydration and minimize risk for aspiration. Short Term:  Goals:  The patient will tolerate recommended diet without observed clinical signs of aspiration      Oral Preparation / Oral Phase  Oral Phase: WFL    Oral Phase: A-P transit and oral manipulation functional for consistencues tested    Pharyngeal Phase  Pharyngeal Phase: WFL    Pharyngeal: Thin, Puree, Soft Solids: No penetration and no aspiration and no stasis    Dysphagia Outcome Severity Scale: Level 6: Within functional limits/Modified independence  Penetration-Aspiration Scale (PAS): 1 - Material does not enter the airway        Esophageal Phase  Esophageal Screen: WFL        Pain   Patient Currently in Pain: No  Pain Level: 0      Therapy Time:   Individual Concurrent Group Co-treatment   Time In 1410         Time Out 1425         Minutes 15                   Lidya Moser, 1/8/2021, 2:36 PM

## 2021-01-08 NOTE — PROGRESS NOTES
Physical Therapy  Facility/Department: [facility] ICU  Daily Treatment Note  NAME: Julian Sorenson  : 1933  MRN: [id number]    Date of Service: 2021    Discharge Recommendations:  Subacute/Skilled Nursing Facility        Assessment   Body structures, Functions, Activity limitations: Decreased functional mobility ; Decreased endurance;Decreased strength;Decreased balance  Assessment: Pt with deficits of bed mobility, transfers, ambulation, balance, Poor safety awareness and endurance this session. Pt requires 2 assist for safe bed mboility,  transfers & gait,  With current deficits, recommend SNF at D/C to return to baseline function and a safe D/C back to home environment. Pt requires continued PT to maximize independence with functional mobility, balance, safety awareness & activity tolerance. Prognosis: Good  PT Education: PT Role;Plan of Care;General Safety; Energy Conservation  Patient Education: Ed optimal breathing techniques, ex's with optimal breathing, posture & positioning for optimal breathing, supine exercises  REQUIRES PT FOLLOW UP: Yes  Activity Tolerance  Activity Tolerance: Patient limited by endurance  Activity Tolerance: poor tolerance of activity with saO2 rangeing from 82-90% on hi flow NC     Patient Diagnosis(es): The primary encounter diagnosis was COVID-19. Diagnoses of Pulmonary fibrosis ([address] and Pneumonia due to organism were also pertinent to this visit. has a past medical history of Asthma, COPD (chronic obstructive pulmonary disease) ([address] and Pulmonary fibrosis ([facility] [address] has a past surgical history that includes pre-malignant / benign skin lesion excision (2017).     Restrictions  Restrictions/Precautions  Restrictions/Precautions: General Precautions, Contact Precautions  Required Braces or Orthoses?: No  Position Activity Restriction Other position/activity restrictions: droplet + for +covid-19, DNR-CCA, telemetry, up with assist, hi flow, continuous pulse ox & wired telemetry, LUE IV  Subjective   General  Chart Reviewed: Yes  Response To Previous Treatment: Patient with no complaints from previous session. Family / Caregiver Present: No  Subjective  Subjective: Pt agreeable to PT  General Comment  Comments: RN sourav PT          Orientation  Orientation  Overall Orientation Status: Within Normal Limits  Cognition   Cognition  Overall Cognitive Status: Exceptions  Arousal/Alertness: Appropriate responses to stimuli  Following Commands: Follows multistep commands with repitition  Attention Span: Attends with cues to redirect; Difficulty dividing attention  Memory: Decreased short term memory;Decreased recall of recent events;Decreased recall of precautions  Safety Judgement: Decreased awareness of need for safety;Decreased awareness of need for assistance  Problem Solving: Decreased awareness of errors;Assistance required to correct errors made;Assistance required to implement solutions;Assistance required to identify errors made;Assistance required to generate solutions  Insights: Decreased awareness of deficits  Initiation: Requires cues for all  Sequencing: Requires cues for all  Objective   Bed mobility  Supine to Sit: Moderate assistance;2 Person assistance  Sit to Supine: Moderate assistance;2 Person assistance  Scooting: Moderate assistance;2 Person assistance  Comment: Patient required MAX VCs for sequencing, tech, and encouragement to use bed railing.   Transfers  Sit to Stand: Unable to assess  Stand to sit: Unable to assess  Bed to Chair: Unable to assess  Stand Pivot Transfers: Unable to assess  Squat Pivot Transfers: Unable to assess  Lateral Transfers: Unable to assess  Car Transfer: Unable to assess Comment: Sitting EOB Patient''s SaO2 dropped to 84% on HFNC and was not returning to WNL. Patient sat EOB x 2 mins and agreeable to supine exercises. Ambulation  Ambulation?: No  More Ambulation?: No     Balance  Posture: Fair  Sitting - Static: Good  Sitting - Dynamic: Good;-  Exercises  Comments: Supine eric LE AROM intially but then patient's SaO2 was beginnging to declined again. Verbally reviewed supine exercises to have patient perform later (QS, AP, GS and HS)           Goals  Short term goals  Time Frame for Short term goals: 12 treatments  Short term goal 1: Inc bed-mobility & transfers to independent to enable pt to safely get in/OOB  Short term goal 2: Inc gait to amb 150ft or > indep w/ RW to enable pt to return to previous level of independence  Short term goal 3: Inc strength to [address] standing balance to good with device to facilitate pt independence for performance of ADL's & functional mobility, & reduce fall risk; Short term goal 4: Pt able to tolerate 30-40 min of activity to include 15-20 reps of ex & functional mobility including 5 minutes of standing to facilitate activity tolerance to [facility]; Short term goal 5: Ed use of incentive spirometer & optimal breathing techniques, ex's with incorporation of breathing techniques, posture & positioning for optimal breathing &  mental training ex's & issue written pt education  Patient Goals   Patient goals : Back to Rehab then to [facility].     Plan    Plan  Times per week: 1-2x/day,6-7 days/week  Current Treatment Recommendations: Strengthening, Balance Training, Endurance Training, Gait Training, Functional Mobility Training, Transfer Training, Stair training  Safety Devices  Type of devices: Nurse notified, Call light within reach, Left in bed, Gait belt  Restraints  Initially in place: No     Therapy Time   Individual Concurrent Group Co-treatment   Time In       1246   Time Out       1309   Minutes       23

## 2021-01-08 NOTE — PLAN OF CARE
Problem: Airway Clearance - Ineffective  Goal: Achieve or maintain patent airway  Outcome: Ongoing     Problem: Gas Exchange - Impaired  Goal: Absence of hypoxia  Outcome: Ongoing  Goal: Promote optimal lung function  Outcome: Ongoing     Problem: Breathing Pattern - Ineffective  Goal: Ability to achieve and maintain a regular respiratory rate  Outcome: Ongoing     Problem:  Body Temperature -  Risk of, Imbalanced  Goal: Ability to maintain a body temperature within defined limits  Outcome: Ongoing  Goal: Will regain or maintain usual level of consciousness  Outcome: Ongoing  Goal: Complications related to the disease process, condition or treatment will be avoided or minimized  Outcome: Ongoing     Problem: Isolation Precautions - Risk of Spread of Infection  Goal: Prevent transmission of infection  Outcome: Ongoing     Problem: Nutrition Deficits  Goal: Optimize nutrtional status  Outcome: Ongoing     Problem: Risk for Fluid Volume Deficit  Goal: Maintain normal heart rhythm  Outcome: Ongoing  Goal: Maintain absence of muscle cramping  Outcome: Ongoing  Goal: Maintain normal serum potassium, sodium, calcium, phosphorus, and pH  Outcome: Ongoing     Problem: Loneliness or Risk for Loneliness  Goal: Demonstrate positive use of time alone when socialization is not possible  Outcome: Ongoing     Problem: Fatigue  Goal: Verbalize increase energy and improved vitality  Outcome: Ongoing     Problem: Patient Education: Go to Patient Education Activity  Goal: Patient/Family Education  Outcome: Ongoing     Problem: Skin Integrity:  Goal: Will show no infection signs and symptoms  Description: Will show no infection signs and symptoms  Outcome: Ongoing  Goal: Absence of new skin breakdown  Description: Absence of new skin breakdown  Outcome: Ongoing     Problem: Falls - Risk of:  Goal: Will remain free from falls  Description: Will remain free from falls  Outcome: Ongoing  Goal: Absence of physical injury Description: Absence of physical injury  Outcome: Ongoing     Problem: Nutrition  Goal: Optimal nutrition therapy  Outcome: Ongoing

## 2021-01-08 NOTE — PLAN OF CARE
Problem: Breathing Pattern - Ineffective  Goal: Ability to achieve and maintain a regular respiratory rate  Intervention: Stress management techniques  Note: Respiratory assessment performed and charted. Lungs assessed. Monitored respiratory eli, rhythm and pattern. Instructed to call with any respiratory distress. Monitored respiratory assessment performed and charted. Monitored for any increase in WOB. Patient remains on HFNC. Continuous pulse oximetry. Problem: Skin Integrity:  Goal: Will show no infection signs and symptoms  Description: Will show no infection signs and symptoms  1/8/2021 0957 by Laisha Sampson RN  Outcome: Ongoing  Note: Skin assessment performed and charted. Assessed for areas of redness and or breakdown. Avni scale order set in place. Patient requires maximum assistance with position changes. Patient has redness and skin tear to coccyx. Mepilex in place. Patient instructed on the importance of position changes for prevention of skin breakdown. Assessed oral mucosa. Monitored for areas of  redness, thrush, open areas and dentition. Monitored nutritional intake. Instructed on the importance of adequate nutrition for promotion of healing. 1/8/2021 0116 by Leatha Euceda RN  Outcome: Ongoing     Problem: Falls - Risk of:  Goal: Will remain free from falls  Description: Will remain free from falls  1/8/2021 0957 by Laisha Sampson RN  Outcome: Ongoing  Note: Continue fall precautions including arm band identification, falling star placed outside of the room and alarm at night and when unattended. Use of ambulatory aids when indicated and frequent visual checks. Monitored orientation status. Call light within reach at all times. Bed remains in the lowest locked position. All clutter removed from room. All personal belongings within reach. Instructed to call with any needs for assistance. Fall precautions remain in place.    1/8/2021 0116 by Leatha Euceda RN  Outcome: Ongoing

## 2021-01-08 NOTE — PROGRESS NOTES
Position Activity Restriction  Other position/activity restrictions: droplet + for +covid-19, DNR-CCA, telemetry, up with assist, hi flow, continuous pulse ox & wired telemetry, LUE IV  Subjective   General  Chart Reviewed: Yes  Patient assessed for rehabilitation services?: Yes  Response to previous treatment: Patient with no complaints from previous session  Family / Caregiver Present: No  Subjective  Subjective: Pt agreeable to OT tx session, appears anxious at times. Orientation  Orientation  Overall Orientation Status: Within Functional Limits  Objective    ADL  UE Dressing: Maximum assistance;Setup(to don/doff gown)        Balance  Sitting Balance: (sat EOB ~10 minutes, verbal cues for pursed lip breathing, upright posture, and reducing anxiety for SpO2 to recover. Patient's O2 sat 85-87% unable to recover seated EOB)  Standing Balance: Unable to assess(comment)    Bed mobility  Supine to Sit: Moderate assistance;2 Person assistance  Sit to Supine: Moderate assistance;2 Person assistance  Comment: Patient requires MAX cues for sequencing/technique and encouragement to use bed rails for bed mobility vs pulling up on writer. Transfers  Transfer Comments: unable to complete this date d/t low respiratory staus      Cognition  Overall Cognitive Status: Exceptions  Arousal/Alertness: Appropriate responses to stimuli  Following Commands: Follows multistep commands with repitition  Attention Span: Attends with cues to redirect; Difficulty dividing attention  Memory: Decreased short term memory;Decreased recall of recent events;Decreased recall of precautions  Safety Judgement: Decreased awareness of need for safety;Decreased awareness of need for assistance  Problem Solving: Decreased awareness of errors;Assistance required to correct errors made;Assistance required to implement solutions;Assistance required to identify errors made;Assistance required to generate solutions Insights: Decreased awareness of deficits  Initiation: Requires cues for all  Sequencing: Requires cues for all  Cognition Comment: Pt appears very anxious throughout OT tx session. Plan   Plan  Times per week: 4-5x/wk 1x/day as hua  Current Treatment Recommendations: Strengthening, Balance Training, Functional Mobility Training, Endurance Training, Equipment Evaluation, Education, & procurement, Patient/Caregiver Education & Training, Safety Education & Training, Self-Care / ADL  AM-PAC Score     AM-PAC Inpatient Mobility without Stair Climbing Raw Score : 11 (01/08/21 1348)  AM-PAC Inpatient without Stair Climbing T-Scale Score : 35.66 (01/08/21 1348)  Mobility Inpatient CMS 0-100% Score: 67.36 (01/08/21 1348)  Mobility Inpatient without Stair CMS G-Code Modifier : CL (01/08/21 1348)  AM-PAC Inpatient Daily Activity Raw Score: 12 (01/08/21 1347)  AM-PAC Inpatient ADL T-Scale Score : 30.6 (01/08/21 1347)  ADL Inpatient CMS 0-100% Score: 66.57 (01/08/21 1347)  ADL Inpatient CMS G-Code Modifier : CL (01/08/21 1347)    Goals  Short term goals  Time Frame for Short term goals: By discharge, pt to demo  Short term goal 1: bed mobility to Min A of 1 with use of bed rail as needed. Short term goal 2: UB ADLs to SBA/LB ADLs to Min A with use of AD/AE as needed. Short term goal 3: increased BUE strength by 1/2 grade/I with simple BUE HEP to assist with self care tasks with use of handouts as needed  Short term goal 4: ADL transfers and functional mobility to Min A of 1 with use of AD as needed. Short term goal 5: toileting to Min A with use of AD/grab bars as needed. Long term goals  Long term goal 1: Pt to demo increased standing hua > 5 min with Min A using AD as needed to reduce fall risk during self care tasks. Long term goal 2: Pt/caregivers to be I with fall prevention/ EC/WS, recommendations for AE/DME and pressure relief with use of handouts as needed.   Patient Goals Patient goals : To feel better and go home! Therapy Time   Individual Concurrent Group Co-treatment   Time In       3319   Time Out       1   Minutes       23     Co-treatment with PT warranted secondary to decreased safety and independence requiring 2 skilled therapy professionals to address individual discipline's goals. OT addressing preparation for ADL transfer, functional reaching, environmental safety/scanning, fall prevention, functional mobility for ADL transfers and functional UE strength. Upon writer exit, call light within reach, pt retired to bed. All lines intact and patient positioned comfortably. All patient needs addressed prior to ending therapy session. Chart reviewed prior to treatment and patient is agreeable for therapy. RN reports patient is medically stable for therapy treatment this date.       CRISPIN Gonzalez/SIMONE

## 2021-01-08 NOTE — PROGRESS NOTES
[facility]  Office: [address], DO, Sandra Hackett, DO, Winston Bassett, DO, Curt Jordan Blood, DO, Jose Gonsales MD, Tessa Chandra MD, Rosa Elena Mayfield MD, Gonsalo Carroll MD, Theron Duncan MD, Diaz Magana MD, Estela Kumar MD, Edgardo Guy MD, Anuel Amador MD, Aaron Taylor DO, Marco Leary MD, Michelle Rothman MD, Oriana Lay, DO, Landen Kim MD,  Avani Vega DO, Dennie Skinner, MD, Roshan Tom MD, Yaneli Wang, [facility], [facility], CNP, Brayan Adkins, CNP, Axel Gray, CNS, Arin Stovall, CNP, Priya Miller, CNP, Xuan Oreilly, CNP, Casper Harris, CNP, Leonardo Standard, CNP, PHILOMENA Hanna-C, Jhonny Somers, [facility], Shraddha Second, CNP, Asia Cord, CNP, Jose Contras, CNP, Paul East, CNP, Tyra Meth, [facility]    Progress Note    1/8/2021    3:28 PM    Name:   Eldon Lew  MRN:     [id number]     Acct:      [de-identified]   Room:   [address] [place] [address] Day:  9  Admit Date:  12/30/2020 10:47 AM    PCP:   Jaz Hickey MD  Code Status:  DNR-CCA    Subjective:     C/C:   Chief Complaint   Patient presents with    Shortness of Breath     pt states gradually gotten worse over the past few months     Interval History Status: improved. Patient was seen and evaluated at bedside this morning.     Patient continues to be on high flow nasal cannula at this time,  Needing 50 L, 45% FiO2  Patient feeling very dry,         Brief History: Patient is a resident at assisted living facility. Patient reports that the facility has recently had an outbreak of Covid. Patient reports over the past 3 days he has been experiencing exertional fatigue followed rapidly by a persistent cough with exertional dyspnea over the past 24 hours. Patient reports that the symptoms are present in between his ADLs and he has been having increasing difficulty with breathing throughout the day. Patient reports to the freestanding emergency department by EMS where he was found to be profoundly hypoxic and was started on supplemental oxygen by nonrebreather mask at 15 L. Initial lab testing was completed and finds patient is Covid positive. Patient is admitted to the hospital for acute respiratory failure with hypoxia and will be treated aggressively with standard Covid therapy. Review of Systems:     Constitutional:  negative for chills, fevers, sweats  Respiratory: Still coughing, shortness of breath  Cardiovascular:  negative for chest pain,   Gastrointestinal:  negative for abdominal pain, constipation, diarrhea, nausea, vomiting  Neurological:  negative for dizziness, headache    Medications: Allergies:     Allergies   Allergen Reactions    Bactrim     Other      Sweet potatoes     Prednisone Other (See Comments)     Trouble voiding       Current Meds:   Scheduled Meds:    QUEtiapine  50 mg Oral BID    furosemide  40 mg Intravenous Daily    midodrine  5 mg Oral TID     sodium chloride flush  10 mL Intravenous 2 times per day    ipratropium-albuterol  1 ampule Inhalation Q4H WA    enoxaparin  40 mg Subcutaneous Daily    aspirin  81 mg Oral Daily    Pirfenidone  3 capsule Oral TID WC    famotidine  40 mg Oral Daily    fluticasone  2 spray Each Nostril Daily    tamsulosin  0.4 mg Oral Daily    Arformoterol Tartrate  15 mcg Nebulization BID    budesonide  1 mg Nebulization BID    sodium chloride flush  10 mL Intravenous 2 times per day  dexamethasone  6 mg Oral Daily    Vitamin D  6,000 Units Oral Daily    zinc sulfate  50 mg Oral Daily     Continuous Infusions:    dexmedetomidine (PRECEDEX) IV infusion Stopped (21 0500)    sodium chloride       PRN Meds: polyethylene glycol, sodium chloride flush, ALPRAZolam, calcium carbonate, albuterol, sodium chloride flush, potassium chloride **OR** potassium alternative oral replacement **OR** potassium chloride, magnesium sulfate, promethazine **OR** ondansetron, nicotine, acetaminophen **OR** acetaminophen, sodium chloride, guaiFENesin-dextromethorphan, sodium chloride    Data:     Past Medical History:   has a past medical history of Asthma, COPD (chronic obstructive pulmonary disease) ([facility] [address] and [facility] Social History:   reports that he quit smoking about 53 years ago. He has never used smokeless tobacco. He reports that he does not drink alcohol or use drugs. Family History:   Family History   Problem Relation Age of Onset    Heart Attack Mother     Heart Attack Father        Vitals:  /78   Pulse 67   Temp 98.8 °F (37.1 °C) (Oral)   Resp (!) 31   Ht 5' 6\" (1.676 m)   Wt 134 lb 6.4 oz (61 kg)   SpO2 95%   BMI 21.69 kg/m²   Temp (24hrs), Av.4 °F (36.9 °C), Min:98.1 °F (36.7 °C), Max:98.9 °F (37.2 °C)    No results for input(s): POCGLU in the last 72 hours. I/O (24Hr):     Intake/Output Summary (Last 24 hours) at 2021 1528  Last data filed at 2021 0345  Gross per 24 hour   Intake 600 ml   Output 1750 ml   Net -1150 ml       Labs:  Hematology:  Recent Labs     21  1906 21  1555 21  0326   WBC  --   --  5.4   RBC  --   --  3.54*   HGB  --   --  10.7*   HCT  --   --  33.2*   MCV  --   --  93.8   MCH  --   --  30.2   MCHC  --   --  32.2   RDW  --   --  14.8*   PLT  --   --  85*   MPV  --   --  11.4   CRP 40.7* 20.1*  --    DDIMER 0.65* 0.60*  --      Chemistry:  Recent Labs     21  0326   *   K 4.9   CL 87* CO2 41*   GLUCOSE 123*   BUN 18   CREATININE <0.40*   ANIONGAP 5*   LABGLOM CANNOT BE CALCULATED   GFRAA CANNOT BE CALCULATED   CALCIUM 8.8     No results for input(s): PROT, LABALBU, LABA1C, V9LQROS, R7FNRLS, FT4, TSH, AST, ALT, LDH, GGT, ALKPHOS, LABGGT, BILITOT, BILIDIR, AMMONIA, AMYLASE, LIPASE, LACTATE, CHOL, HDL, LDLCHOLESTEROL, CHOLHDLRATIO, TRIG, VLDL, UJK05OA, PHENYTOIN, PHENYF, URICACID, POCGLU in the last 72 hours. ABG:No results found for: POCPH, PHART, PH, POCPCO2, DFN0MNI, PCO2, POCPO2, PO2ART, PO2, POCHCO3, PUD1MOQ, HCO3, NBEA, PBEA, BEART, BE, THGBART, THB, HNI5CQR, QQXP5LJQ, W7GLHDBF, O2SAT, FIO2  Lab Results   Component Value Date/Time    [facility] 12/30/2020 12:45 PM     Lab Results   Component Value Date/Time    CULTURE NO GROWTH 6 DAYS 12/30/2020 12:45 PM       Radiology:  Xr Chest Portable    Result Date: 1/1/2021  No significant interval change. Diffuse bilateral patchy airspace opacity and interstitial thickening. Xr Chest Portable    Result Date: 12/30/2020  Patchy diffuse bilateral pulmonary opacification most consistent with multifocal pneumonia including atypical viral pneumonia.        Physical Examination:        General appearance:  alert, cooperative and no distress  Mental Status:  oriented to person, place and time and normal affect  Lungs: Bilateral coarse breath sounds  Heart:  regular rate and rhythm, no murmur  Abdomen:  soft, NT  Extremities:  no edema, redness,  Skin:  no gross lesions, rashes, induration    Assessment:        Hospital Problems           Last Modified POA    * (Principal) Acute respiratory failure due to COVID-19 ([facility] 1/6/2021 Yes    COPD (chronic obstructive pulmonary disease) ([facility] 12/30/2020 Yes    Dyslipidemia 12/30/2020 Yes    Pulmonary interstitial fibrosis ([facility] 12/30/2020 Yes    Pneumonia due to COVID-19 virus 1/6/2021 Yes    Gastroesophageal reflux disease without esophagitis 12/30/2020 Yes    Former smoker 12/30/2020 Yes Benign prostatic hyperplasia 12/30/2020 Yes    Anxiety 12/30/2020 Yes    Acute-on-chronic respiratory failure ([facility] [address] 12/30/2020 Yes    COVID-19 12/30/2020 Yes    SIRS (systemic inflammatory response syndrome) ([facility] [address] 12/30/2020 Yes    Sepsis due to COVID-19 ([facility] [address] 1/6/2021 Yes          Plan:        1. Acute respiratory failure, sepsis due to Covid, status post remdesivir, dexamethasone, S/p convalescent plasma  ,  2. Heated high flow O2 as well as adult BiPAP protocol at respiratory discretion  3. Midodrine added due to hypotension  4. Maintain indwelling urinary catheter for BPH with urinary retention as well as Flomax  5. PPI for GERD, Lipitor for dyslipidemia  6. Anxiety management with home medication as well as Xanax  7. Consult pulmonology  8. Add procalcitonin, blood cultures, trend lactate, trend troponin  9.  Lovenox 30 twice daily for anticoagulation with Asia Solorzano MD  1/8/2021  3:28 PM

## 2021-01-08 NOTE — PROGRESS NOTES
Pulmonary Critical Care Progress Note  Michela Rogers MD     Patient seen for the follow up of  Acute respiratory failure due to COVID-19 [facility])     Subjective:  Patient lying comfortably in the bed. No significant overnight events. Patient had bowel movement yesterday and this morning. He is tolerating orals but intake is not optimal.  He denies any chest pain. He thinks his shortness of breath is better. He has productive cough. Patient received convalescent plasma yesterday. Examination:  Vitals: /78   Pulse 67   Temp 98.8 °F (37.1 °C) (Oral)   Resp (!) 38   Ht 5' 6\" (1.676 m)   Wt 134 lb 6.4 oz (61 kg)   SpO2 92%   BMI 21.69 kg/m²   General appearance:  alert and cooperative with exam, resting in bed  Neck: No JVD  Lungs: Moderate air exchange, occasional rhonchi  Heart: regular rate and rhythm, S1, S2 normal, no gallop  Abdomen: Soft, non tender, + BS  Extremities: no cyanosis or clubbing.  No significant edema    LABs:  CBC:   Recent Labs     01/08/21  0326   WBC 5.4   HGB 10.7*   HCT 33.2*   PLT 85*     BMP:   Recent Labs     01/08/21  0326   *   K 4.9   CO2 41*   BUN 18   CREATININE <0.40*   LABGLOM CANNOT BE CALCULATED   GLUCOSE 123*     Radiology:  1/8/2021      Impression:  · Acute hypoxic respiratory failure  · COVID-19 pneumonia  · Mild pulmonary edema/acute on chronic systolic heart failure  · History of pulmonary fibrosis  · Former smoker, possible COPD  · Moderate pulmonary hypertension, RVSP 50 mmHg  · Mild thrombocytopenia, resolved    Recommendations:  · Oxygen via high flow nasal cannula, keep SPO2 greater than 92%  · BiPAP support as needed during the day and while asleep  · Monitor off of Precedex  · Seroquel 50 mg twice daily  · Incentive spirometry every hour while awake  · Prone as tolerated  · Decadron 6 mg daily  · Remdesivir  · Albuterol and Ipratropium Q 4 hours and prn  · Budesonide and Brovana via nebulizer  · Midodrine 5 mg 3 times daily · Diuresis with IV Lasix  · X-ray chest in am  · Labs: CBC and BMP in am  · DVT prophylaxis with low molecular weight heparin, monitor platelets  · PUD prophylaxis with Pepcid  · DNR CCA  · Discussed with RN  · Will follow with you    Electronically signed by     Paty Figueroa MD on 1/8/2021 at 12:38 PM  Pulmonary Critical Care and Sleep Medicine,  [facility]  Cell: [phone]  Office: [phone]

## 2021-01-09 LAB
BLD PROD TYP BPU: NORMAL
C-REACTIVE PROTEIN: 12.5 MG/L (ref 0–5)
D-DIMER QUANTITATIVE: 0.47 MG/L FEU (ref 0–0.59)
DISPENSE STATUS BLOOD BANK: NORMAL
TRANSFUSION STATUS: NORMAL
UNIT DIVISION: 0
UNIT NUMBER: NORMAL

## 2021-01-09 PROCEDURE — 6360000002 HC RX W HCPCS: Performed by: NURSE PRACTITIONER

## 2021-01-09 PROCEDURE — 6370000000 HC RX 637 (ALT 250 FOR IP): Performed by: INTERNAL MEDICINE

## 2021-01-09 PROCEDURE — 97110 THERAPEUTIC EXERCISES: CPT

## 2021-01-09 PROCEDURE — 2000000000 HC ICU R&B

## 2021-01-09 PROCEDURE — 99232 SBSQ HOSP IP/OBS MODERATE 35: CPT | Performed by: INTERNAL MEDICINE

## 2021-01-09 PROCEDURE — 36415 COLL VENOUS BLD VENIPUNCTURE: CPT

## 2021-01-09 PROCEDURE — 2700000000 HC OXYGEN THERAPY PER DAY

## 2021-01-09 PROCEDURE — 6370000000 HC RX 637 (ALT 250 FOR IP): Performed by: NURSE PRACTITIONER

## 2021-01-09 PROCEDURE — 85379 FIBRIN DEGRADATION QUANT: CPT

## 2021-01-09 PROCEDURE — 94640 AIRWAY INHALATION TREATMENT: CPT

## 2021-01-09 PROCEDURE — 86140 C-REACTIVE PROTEIN: CPT

## 2021-01-09 PROCEDURE — 94761 N-INVAS EAR/PLS OXIMETRY MLT: CPT

## 2021-01-09 PROCEDURE — 2580000003 HC RX 258: Performed by: INTERNAL MEDICINE

## 2021-01-09 PROCEDURE — 6360000002 HC RX W HCPCS: Performed by: INTERNAL MEDICINE

## 2021-01-09 PROCEDURE — 2580000003 HC RX 258: Performed by: NURSE PRACTITIONER

## 2021-01-09 RX ADMIN — MIDODRINE HYDROCHLORIDE 5 MG: 5 TABLET ORAL at 09:53

## 2021-01-09 RX ADMIN — FAMOTIDINE 40 MG: 20 TABLET, FILM COATED ORAL at 20:49

## 2021-01-09 RX ADMIN — TAMSULOSIN HYDROCHLORIDE 0.4 MG: 0.4 CAPSULE ORAL at 20:49

## 2021-01-09 RX ADMIN — BUDESONIDE 500 MCG: 0.5 SUSPENSION RESPIRATORY (INHALATION) at 12:15

## 2021-01-09 RX ADMIN — SODIUM CHLORIDE, PRESERVATIVE FREE 10 ML: 5 INJECTION INTRAVENOUS at 09:13

## 2021-01-09 RX ADMIN — ARFORMOTEROL TARTRATE 15 MCG: 15 SOLUTION RESPIRATORY (INHALATION) at 18:34

## 2021-01-09 RX ADMIN — ENOXAPARIN SODIUM 40 MG: 40 INJECTION SUBCUTANEOUS at 09:11

## 2021-01-09 RX ADMIN — SODIUM CHLORIDE, PRESERVATIVE FREE 10 ML: 5 INJECTION INTRAVENOUS at 09:53

## 2021-01-09 RX ADMIN — SODIUM CHLORIDE, PRESERVATIVE FREE 10 ML: 5 INJECTION INTRAVENOUS at 20:53

## 2021-01-09 RX ADMIN — IPRATROPIUM BROMIDE AND ALBUTEROL SULFATE 1 AMPULE: .5; 3 SOLUTION RESPIRATORY (INHALATION) at 18:41

## 2021-01-09 RX ADMIN — BUDESONIDE 500 MCG: 0.5 SUSPENSION RESPIRATORY (INHALATION) at 15:34

## 2021-01-09 RX ADMIN — IPRATROPIUM BROMIDE AND ALBUTEROL SULFATE 1 AMPULE: .5; 3 SOLUTION RESPIRATORY (INHALATION) at 15:34

## 2021-01-09 RX ADMIN — FUROSEMIDE 40 MG: 10 INJECTION, SOLUTION INTRAMUSCULAR; INTRAVENOUS at 09:11

## 2021-01-09 RX ADMIN — QUETIAPINE FUMARATE 50 MG: 25 TABLET ORAL at 09:11

## 2021-01-09 RX ADMIN — ASPIRIN 81 MG: 81 TABLET, COATED ORAL at 09:11

## 2021-01-09 RX ADMIN — FLUTICASONE PROPIONATE 2 SPRAY: 50 SPRAY, METERED NASAL at 09:12

## 2021-01-09 RX ADMIN — Medication 50 MG: at 09:11

## 2021-01-09 RX ADMIN — BUDESONIDE 500 MCG: 0.5 SUSPENSION RESPIRATORY (INHALATION) at 18:41

## 2021-01-09 RX ADMIN — IPRATROPIUM BROMIDE AND ALBUTEROL SULFATE 1 AMPULE: .5; 3 SOLUTION RESPIRATORY (INHALATION) at 12:14

## 2021-01-09 RX ADMIN — Medication 6000 UNITS: at 09:11

## 2021-01-09 RX ADMIN — QUETIAPINE FUMARATE 50 MG: 25 TABLET ORAL at 20:49

## 2021-01-09 RX ADMIN — MIDODRINE HYDROCHLORIDE 5 MG: 5 TABLET ORAL at 12:27

## 2021-01-09 RX ADMIN — MIDODRINE HYDROCHLORIDE 5 MG: 5 TABLET ORAL at 17:52

## 2021-01-09 RX ADMIN — BUDESONIDE 500 MCG: 0.5 SUSPENSION RESPIRATORY (INHALATION) at 08:53

## 2021-01-09 RX ADMIN — IPRATROPIUM BROMIDE AND ALBUTEROL SULFATE 1 AMPULE: .5; 3 SOLUTION RESPIRATORY (INHALATION) at 08:53

## 2021-01-09 RX ADMIN — DEXAMETHASONE 6 MG: 4 TABLET ORAL at 09:12

## 2021-01-09 RX ADMIN — ARFORMOTEROL TARTRATE 15 MCG: 15 SOLUTION RESPIRATORY (INHALATION) at 09:02

## 2021-01-09 RX ADMIN — SODIUM CHLORIDE, PRESERVATIVE FREE 10 ML: 5 INJECTION INTRAVENOUS at 20:54

## 2021-01-09 ASSESSMENT — PAIN SCALES - GENERAL: PAINLEVEL_OUTOF10: 0

## 2021-01-09 NOTE — PLAN OF CARE
Problem: Airway Clearance - Ineffective  Goal: Achieve or maintain patent airway  1/9/2021 1642 by Ginger Dubose RN  Outcome: Ongoing  1/9/2021 1640 by Ginger Dubose RN  Outcome: Ongoing     Problem: Gas Exchange - Impaired  Goal: Absence of hypoxia  1/9/2021 1642 by Ginger Dubose RN  Outcome: Ongoing  1/9/2021 1640 by Ginger Dubose RN  Outcome: Ongoing     Problem: Gas Exchange - Impaired  Goal: Promote optimal lung function  1/9/2021 1642 by Ginger Dubose RN  Outcome: Ongoing  1/9/2021 1640 by Ginger Dubose RN  Outcome: Ongoing     Problem: Breathing Pattern - Ineffective  Goal: Ability to achieve and maintain a regular respiratory rate  1/9/2021 1642 by Ginger Dubose RN  Outcome: Ongoing  1/9/2021 1640 by Ginger Dubose RN  Outcome: Ongoing   Pt continues on HFNC wean as tolerated, SOB with activity, encourage C&DB, IS 500ml, LS diminished T/O, Sao2 >92%, continue to monitor. Problem: Body Temperature -  Risk of, Imbalanced  Goal: Ability to maintain a body temperature within defined limits  1/9/2021 1642 by Ginger Dubose RN  Outcome: Ongoing  1/9/2021 1640 by Ginger Dubose RN  Outcome: Ongoing     Problem:  Body Temperature -  Risk of, Imbalanced  Goal: Will regain or maintain usual level of consciousness  1/9/2021 1642 by Ginger Dubose RN  Outcome: Ongoing  1/9/2021 1640 by Ginger Dubose RN  Outcome: Ongoing   Pt afebrile, monitor WBC    Problem: Nutrition Deficits  Goal: Optimize nutrtional status  1/9/2021 1642 by Ginger Dubose RN  Outcome: Ongoing  1/9/2021 1640 by Ginger Dubose RN  Outcome: Ongoing  Pt ate 50% of all meals, continue to encourage protein shakes    Problem: Risk for Fluid Volume Deficit  Goal: Maintain normal heart rhythm  1/9/2021 1642 by Ginger Dubose RN  Outcome: Ongoing  1/9/2021 1640 by Ginger Dubose RN  Outcome: Ongoing  Goal: Maintain absence of muscle cramping  1/9/2021 1642 by Ginger Dubose RN  Outcome: Ongoing  1/9/2021 1640 by Ginger Dubose RN  Outcome: Ongoing

## 2021-01-09 NOTE — PROGRESS NOTES
Patient is a resident at assisted living facility. Patient reports that the facility has recently had an outbreak of Covid. Patient reports over the past 3 days he has been experiencing exertional fatigue followed rapidly by a persistent cough with exertional dyspnea over the past 24 hours. Patient reports that the symptoms are present in between his ADLs and he has been having increasing difficulty with breathing throughout the day. Patient reports to the freestanding emergency department by EMS where he was found to be profoundly hypoxic and was started on supplemental oxygen by nonrebreather mask at 15 L. Initial lab testing was completed and finds patient is Covid positive. Patient is admitted to the hospital for acute respiratory failure with hypoxia and will be treated aggressively with standard Covid therapy. Review of Systems:     Constitutional:  negative for chills, fevers, sweats  Respiratory: Still coughing, shortness of breath  Cardiovascular:  negative for chest pain,   Gastrointestinal:  negative for abdominal pain, constipation, diarrhea, nausea, vomiting  Neurological:  negative for dizziness, headache    Medications: Allergies:     Allergies   Allergen Reactions    Bactrim     Other      Sweet potatoes     Prednisone Other (See Comments)     Trouble voiding       Current Meds:   Scheduled Meds:    budesonide  0.5 mg Nebulization 4x daily    QUEtiapine  50 mg Oral BID    furosemide  40 mg Intravenous Daily    midodrine  5 mg Oral TID     sodium chloride flush  10 mL Intravenous 2 times per day    ipratropium-albuterol  1 ampule Inhalation Q4H WA    enoxaparin  40 mg Subcutaneous Daily    aspirin  81 mg Oral Daily    Pirfenidone  3 capsule Oral TID     famotidine  40 mg Oral Daily    fluticasone  2 spray Each Nostril Daily    tamsulosin  0.4 mg Oral Daily    Arformoterol Tartrate  15 mcg Nebulization BID    sodium chloride flush  10 mL Intravenous 2 times per day  Vitamin D  6,000 Units Oral Daily    zinc sulfate  50 mg Oral Daily     Continuous Infusions:    dexmedetomidine (PRECEDEX) IV infusion Stopped (21 0500)    sodium chloride       PRN Meds: polyethylene glycol, sodium chloride flush, ALPRAZolam, calcium carbonate, albuterol, sodium chloride flush, potassium chloride **OR** potassium alternative oral replacement **OR** potassium chloride, magnesium sulfate, promethazine **OR** ondansetron, nicotine, acetaminophen **OR** acetaminophen, sodium chloride, guaiFENesin-dextromethorphan, sodium chloride    Data:     Past Medical History:   has a past medical history of Asthma, COPD (chronic obstructive pulmonary disease) ([facility] [address] and [facility] Social History:   reports that he quit smoking about 53 years ago. He has never used smokeless tobacco. He reports that he does not drink alcohol or use drugs. Family History:   Family History   Problem Relation Age of Onset    Heart Attack Mother     Heart Attack Father        Vitals:  BP 95/61   Pulse 86   Temp 98.4 °F (36.9 °C) (Oral)   Resp 22   Ht 5' 6\" (1.676 m)   Wt 134 lb 6.4 oz (61 kg)   SpO2 93%   BMI 21.69 kg/m²   Temp (24hrs), Av.8 °F (37.1 °C), Min:98.4 °F (36.9 °C), Max:99.1 °F (37.3 °C)    No results for input(s): POCGLU in the last 72 hours. I/O (24Hr):     Intake/Output Summary (Last 24 hours) at 2021 1638  Last data filed at 2021 1600  Gross per 24 hour   Intake 1540 ml   Output 2775 ml   Net -1235 ml       Labs:  Hematology:  Recent Labs     21  1555 21  0326 21  0643   WBC  --  5.4  --    RBC  --  3.54*  --    HGB  --  10.7*  --    HCT  --  33.2*  --    MCV  --  93.8  --    MCH  --  30.2  --    MCHC  --  32.2  --    RDW  --  14.8*  --    PLT  --  85*  --    MPV  --  11.4  --    CRP 20.1*  --  12.5*   DDIMER 0.60*  --  0.47     Chemistry:  Recent Labs     21  0326   *   K 4.9   CL 87*   CO2 41*   GLUCOSE 123*   BUN 18 CREATININE <0.40*   ANIONGAP 5*   LABGLOM CANNOT BE CALCULATED   GFRAA CANNOT BE CALCULATED   CALCIUM 8.8     No results for input(s): PROT, LABALBU, LABA1C, V1LYXFV, J2QVIYX, FT4, TSH, AST, ALT, LDH, GGT, ALKPHOS, LABGGT, BILITOT, BILIDIR, AMMONIA, AMYLASE, LIPASE, LACTATE, CHOL, HDL, LDLCHOLESTEROL, CHOLHDLRATIO, TRIG, VLDL, GGW52VL, PHENYTOIN, PHENYF, URICACID, POCGLU in the last 72 hours. ABG:No results found for: POCPH, PHART, PH, POCPCO2, FAL2KVY, PCO2, POCPO2, PO2ART, PO2, POCHCO3, MTR4QFW, HCO3, NBEA, PBEA, BEART, BE, THGBART, THB, JBT4AVY, RATT3ITH, C4ERDMAL, O2SAT, FIO2  Lab Results   Component Value Date/Time    [facility] 12/30/2020 12:45 PM     Lab Results   Component Value Date/Time    CULTURE NO GROWTH 6 DAYS 12/30/2020 12:45 PM       Radiology:  Xr Chest Portable    Result Date: 1/1/2021  No significant interval change. Diffuse bilateral patchy airspace opacity and interstitial thickening. Xr Chest Portable    Result Date: 12/30/2020  Patchy diffuse bilateral pulmonary opacification most consistent with multifocal pneumonia including atypical viral pneumonia.        Physical Examination:        General appearance:  alert, cooperative and no distress  Mental Status:  oriented to person, place and time and normal affect  Lungs: Bilateral coarse breath sounds  Heart:  regular rate and rhythm, no murmur  Abdomen:  soft, NT  Extremities:  no edema, redness,  Skin:  no gross lesions, rashes, induration    Assessment:        Hospital Problems           Last Modified POA    * (Principal) Acute respiratory failure due to COVID-19 ([facility] [address] 1/6/2021 Yes    COPD (chronic obstructive pulmonary disease) ([facility] [address] 12/30/2020 Yes    Dyslipidemia 12/30/2020 Yes    Pulmonary interstitial fibrosis ([facility] [address] 12/30/2020 Yes    Pneumonia due to COVID-19 virus 1/6/2021 Yes    Gastroesophageal reflux disease without esophagitis 12/30/2020 Yes    Former smoker 12/30/2020 Yes Benign prostatic hyperplasia 12/30/2020 Yes    Anxiety 12/30/2020 Yes    Acute-on-chronic respiratory failure ([facility] [address] 12/30/2020 Yes    COVID-19 12/30/2020 Yes    SIRS (systemic inflammatory response syndrome) ([facility] [address] 12/30/2020 Yes    Sepsis due to COVID-19 ([facility] [address] 1/6/2021 Yes          Plan:        1. Acute respiratory failure, sepsis due to Covid, status post remdesivir, dexamethasone, S/p convalescent plasma  ,  2. Heated high flow O2 as well as adult BiPAP protocol at respiratory discretion  3. Midodrine added due to hypotension  4. Maintain indwelling urinary catheter for BPH with urinary retention as well as Flomax  5. PPI for GERD, Lipitor for dyslipidemia  6. Anxiety management with home medication as well as Xanax  7. Consult pulmonology  8.  Lovenox 30 twice daily for anticoagulation with Gabriela Omer MD  1/9/2021  4:38 PM

## 2021-01-09 NOTE — PROGRESS NOTES
Pulmonary Critical Care Progress Note  Shivam Howell MD     Patient seen for the follow up of  Acute respiratory failure due to COVID-19 [facility])     Subjective:  He is resting comfortably in bed on high flow. His shortness of breath is about the same. He has occasional productive cough. He denies any chest pain. He had swallow study yesterday it did not show any evidence of aspiration or penetration. His oral intake is poor. Examination:  Vitals: BP 99/62   Pulse 82   Temp 98.6 °F (37 °C) (Oral)   Resp 20   Ht 5' 6\" (1.676 m)   Wt 134 lb 6.4 oz (61 kg)   SpO2 91%   BMI 21.69 kg/m²   General appearance:  alert and cooperative with exam, resting in bed  Neck: No JVD  Lungs: Moderate air exchange, no wheezing, rales or rhonchi at this time  Heart: regular rate and rhythm, S1, S2 normal, no gallop  Abdomen: Soft, non tender, + BS  Extremities: no cyanosis or clubbing.  No significant edema    LABs:  CBC:   Recent Labs     01/08/21  0326   WBC 5.4   HGB 10.7*   HCT 33.2*   PLT 85*     BMP:   Recent Labs     01/08/21  0326   *   K 4.9   CO2 41*   BUN 18   CREATININE <0.40*   LABGLOM CANNOT BE CALCULATED   GLUCOSE 123*     Radiology:  1/8/2021      Impression:  · Acute hypoxic respiratory failure  · COVID-19 pneumonia  · Mild pulmonary edema/acute on chronic systolic heart failure  · History of pulmonary fibrosis  · Former smoker, possible COPD  · Moderate pulmonary hypertension, RVSP 50 mmHg  · Mild thrombocytopenia, resolved    Recommendations:  · Oxygen via high flow nasal cannula, wean as able, keep SPO2 greater than 92%  · BiPAP support as needed during the day and while asleep  · Monitor off of Precedex  · Seroquel 50 mg twice daily  · Incentive spirometry every hour while awake  · Prone as tolerated  · Decadron 6 mg daily  · Remdesivir  · Albuterol and Ipratropium Q 4 hours and prn  · Budesonide and Brovana via nebulizer  · Midodrine 5 mg 3 times daily  · Diuresis with IV Lasix 40 mg daily · Continue Esbriet  · X-ray chest in am  · Labs: CBC and BMP in am  · DVT prophylaxis with low molecular weight heparin, monitor platelets  · PUD prophylaxis with Pepcid  · DNR CCA  · We will watch in ICU 1 more day  · Discussed with RN  · Will follow with you    Electronically signed by     Wilner Guadarrama MD on 1/9/2021 at 1:53 PM  Pulmonary Critical Care and Sleep Medicine,  [facility]  Cell: [phone]  Office: [phone]

## 2021-01-09 NOTE — PROGRESS NOTES
Physical Therapy  Facility/Department: [facility] ICU  Daily Treatment Note  NAME: Katalina Alejandro  : 1933  MRN: [id number]    Date of Service: 2021    Discharge Recommendations:  Subacute/Skilled Nursing Facility   Assessment   Body structures, Functions, Activity limitations: Decreased functional mobility ; Decreased endurance;Decreased strength;Decreased balance  Assessment: Pt with deficits of bed mobility, transfers, ambulation, balance, Poor safety awareness and endurance this session. Pt requires 2 assist for safe bed mboility,  transfers & gait,  With current deficits, recommend SNF at D/C to return to baseline function and a safe D/C back to home environment. Pt requires continued PT to maximize independence with functional mobility, balance, safety awareness & activity tolerance. Prognosis: Good  Decision Making: Medium Complexity  Clinical Presentation: evolving  REQUIRES PT FOLLOW UP: Yes  Activity Tolerance  Activity Tolerance: Patient limited by endurance     Patient Diagnosis(es): The primary encounter diagnosis was COVID-19. Diagnoses of Pulmonary fibrosis ([facility] [address] and Pneumonia due to organism were also pertinent to this visit. has a past medical history of Asthma, COPD (chronic obstructive pulmonary disease) ([facility] [address] and Pulmonary fibrosis ([facility] [address] has a past surgical history that includes pre-malignant / benign skin lesion excision (2017).     Restrictions  Restrictions/Precautions  Restrictions/Precautions: General Precautions, Contact Precautions  Required Braces or Orthoses?: No  Position Activity Restriction  Other position/activity restrictions: droplet + for +covid-19, DNR-CCA, telemetry, up with assist, hi flow, continuous pulse ox & wired telemetry, LUE IV  Subjective   General  Chart Reviewed: Yes  Family / Caregiver Present: No  Subjective  Subjective: Pt agreeable to PT  General Comment  Comments: RN okays PT(Pt up in chair upon arrival for PT session)    Orientation Orientation  Overall Orientation Status: Within Normal Limits  Transfers  Rolling: Moderate Assistance  Sit to Stand: Moderate Assistance  Stand to sit: Moderate Assistance  Ambulation  Ambulation?: No  Balance  Posture: Fair  Sitting - Static: Good  Sitting - Dynamic: Good;-  Standing - Static: Fair  Exercises  Quad Sets: 10  Heelslides: 10 x2  Gluteal Sets: 10  Hip Flexion: 20(seated marches)  Hip Abduction: 10  Knee Long Arc Quad: 10x 2  Ankle Pumps: 20  Comments: Pt performed sit to stands x2 with static standing 10 seconds and 25 seconds. SpO2 quickly dropped into the upper 80s with static standing. Rest breaks required after sit to stands and all exercises. Goals  Short term goals  Time Frame for Short term goals: 12 treatments  Short term goal 1: Inc bed-mobility & transfers to independent to enable pt to safely get in/OOB  Short term goal 2: Inc gait to amb 150ft or > indep w/ RW to enable pt to return to previous level of independence  Short term goal 3: Inc strength to [address] standing balance to good with device to facilitate pt independence for performance of ADL's & functional mobility, & reduce fall risk; Short term goal 4: Pt able to tolerate 30-40 min of activity to include 15-20 reps of ex & functional mobility including 5 minutes of standing to facilitate activity tolerance to [facility]; Short term goal 5: Ed use of incentive spirometer & optimal breathing techniques, ex's with incorporation of breathing techniques, posture & positioning for optimal breathing &  mental training ex's & issue written pt education  Patient Goals   Patient goals : Back to Rehab then to [facility].     Plan    Plan  Times per week: 1-2x/day,6-7 days/week  Current Treatment Recommendations: Strengthening, Balance Training, Endurance Training, Gait Training, Functional Mobility Training, Transfer Training, Stair training  Safety Devices Type of devices: Nurse notified, Call light within reach, Left in chair, Gait belt, All fall risk precautions in place  Restraints  Initially in place: No     Therapy Time   Individual Concurrent Group Co-treatment   Time In  1335         Time Out  1359         Minutes  [address], [place]

## 2021-01-09 NOTE — PLAN OF CARE
Problem: Gas Exchange - Impaired  Goal: Absence of hypoxia  1/8/2021 2214 by Mouna Brock  Outcome: Ongoing  1/8/2021 0957 by El Lim RN  Outcome: Ongoing   Monitor resp. status

## 2021-01-10 ENCOUNTER — APPOINTMENT (OUTPATIENT)
Dept: GENERAL RADIOLOGY | Age: 86
DRG: 871 | End: 2021-01-10
Payer: MEDICARE

## 2021-01-10 LAB
ALBUMIN SERPL-MCNC: 3.4 G/DL (ref 3.5–5.2)
ALBUMIN/GLOBULIN RATIO: ABNORMAL (ref 1–2.5)
ALP BLD-CCNC: 65 U/L (ref 40–129)
ALT SERPL-CCNC: 32 U/L (ref 5–41)
ANION GAP SERPL CALCULATED.3IONS-SCNC: 7 MMOL/L (ref 9–17)
AST SERPL-CCNC: 24 U/L
BILIRUB SERPL-MCNC: 0.29 MG/DL (ref 0.3–1.2)
BUN BLDV-MCNC: 25 MG/DL (ref 8–23)
BUN/CREAT BLD: ABNORMAL (ref 9–20)
CALCIUM SERPL-MCNC: 9.2 MG/DL (ref 8.6–10.4)
CHLORIDE BLD-SCNC: 85 MMOL/L (ref 98–107)
CO2: 39 MMOL/L (ref 20–31)
CREAT SERPL-MCNC: <0.4 MG/DL (ref 0.7–1.2)
GFR AFRICAN AMERICAN: ABNORMAL ML/MIN
GFR NON-AFRICAN AMERICAN: ABNORMAL ML/MIN
GFR SERPL CREATININE-BSD FRML MDRD: ABNORMAL ML/MIN/{1.73_M2}
GFR SERPL CREATININE-BSD FRML MDRD: ABNORMAL ML/MIN/{1.73_M2}
GLUCOSE BLD-MCNC: 141 MG/DL (ref 70–99)
HCT VFR BLD CALC: 36.4 % (ref 40.7–50.3)
HEMOGLOBIN: 11.9 G/DL (ref 13–17)
MCH RBC QN AUTO: 29.5 PG (ref 25.2–33.5)
MCHC RBC AUTO-ENTMCNC: 32.7 G/DL (ref 28.4–34.8)
MCV RBC AUTO: 90.3 FL (ref 82.6–102.9)
NRBC AUTOMATED: 0 PER 100 WBC
PDW BLD-RTO: 14.6 % (ref 11.8–14.4)
PLATELET # BLD: 100 K/UL (ref 138–453)
PMV BLD AUTO: 12.5 FL (ref 8.1–13.5)
POTASSIUM SERPL-SCNC: 4.4 MMOL/L (ref 3.7–5.3)
RBC # BLD: 4.03 M/UL (ref 4.21–5.77)
SODIUM BLD-SCNC: 131 MMOL/L (ref 135–144)
TOTAL PROTEIN: 6.5 G/DL (ref 6.4–8.3)
WBC # BLD: 8.8 K/UL (ref 3.5–11.3)

## 2021-01-10 PROCEDURE — 94761 N-INVAS EAR/PLS OXIMETRY MLT: CPT

## 2021-01-10 PROCEDURE — 36415 COLL VENOUS BLD VENIPUNCTURE: CPT

## 2021-01-10 PROCEDURE — 6370000000 HC RX 637 (ALT 250 FOR IP): Performed by: INTERNAL MEDICINE

## 2021-01-10 PROCEDURE — 6360000002 HC RX W HCPCS: Performed by: INTERNAL MEDICINE

## 2021-01-10 PROCEDURE — 71045 X-RAY EXAM CHEST 1 VIEW: CPT

## 2021-01-10 PROCEDURE — 2700000000 HC OXYGEN THERAPY PER DAY

## 2021-01-10 PROCEDURE — 2580000003 HC RX 258: Performed by: NURSE PRACTITIONER

## 2021-01-10 PROCEDURE — 85027 COMPLETE CBC AUTOMATED: CPT

## 2021-01-10 PROCEDURE — 2000000000 HC ICU R&B

## 2021-01-10 PROCEDURE — 6370000000 HC RX 637 (ALT 250 FOR IP): Performed by: NURSE PRACTITIONER

## 2021-01-10 PROCEDURE — 99232 SBSQ HOSP IP/OBS MODERATE 35: CPT | Performed by: INTERNAL MEDICINE

## 2021-01-10 PROCEDURE — 2580000003 HC RX 258: Performed by: INTERNAL MEDICINE

## 2021-01-10 PROCEDURE — 80053 COMPREHEN METABOLIC PANEL: CPT

## 2021-01-10 PROCEDURE — 94640 AIRWAY INHALATION TREATMENT: CPT

## 2021-01-10 PROCEDURE — 6360000002 HC RX W HCPCS: Performed by: NURSE PRACTITIONER

## 2021-01-10 RX ADMIN — IPRATROPIUM BROMIDE AND ALBUTEROL SULFATE 1 AMPULE: .5; 3 SOLUTION RESPIRATORY (INHALATION) at 18:20

## 2021-01-10 RX ADMIN — MIDODRINE HYDROCHLORIDE 5 MG: 5 TABLET ORAL at 17:13

## 2021-01-10 RX ADMIN — QUETIAPINE FUMARATE 50 MG: 25 TABLET ORAL at 20:29

## 2021-01-10 RX ADMIN — ARFORMOTEROL TARTRATE 15 MCG: 15 SOLUTION RESPIRATORY (INHALATION) at 18:16

## 2021-01-10 RX ADMIN — IPRATROPIUM BROMIDE AND ALBUTEROL SULFATE 1 AMPULE: .5; 3 SOLUTION RESPIRATORY (INHALATION) at 14:24

## 2021-01-10 RX ADMIN — Medication 50 MG: at 08:42

## 2021-01-10 RX ADMIN — Medication 6000 UNITS: at 09:47

## 2021-01-10 RX ADMIN — ANTACID TABLETS 500 MG: 500 TABLET, CHEWABLE ORAL at 20:57

## 2021-01-10 RX ADMIN — IPRATROPIUM BROMIDE AND ALBUTEROL SULFATE 1 AMPULE: .5; 3 SOLUTION RESPIRATORY (INHALATION) at 06:13

## 2021-01-10 RX ADMIN — QUETIAPINE FUMARATE 50 MG: 25 TABLET ORAL at 08:42

## 2021-01-10 RX ADMIN — ARFORMOTEROL TARTRATE 15 MCG: 15 SOLUTION RESPIRATORY (INHALATION) at 06:13

## 2021-01-10 RX ADMIN — FAMOTIDINE 40 MG: 20 TABLET, FILM COATED ORAL at 20:29

## 2021-01-10 RX ADMIN — SODIUM CHLORIDE, PRESERVATIVE FREE 10 ML: 5 INJECTION INTRAVENOUS at 20:40

## 2021-01-10 RX ADMIN — IPRATROPIUM BROMIDE AND ALBUTEROL SULFATE 1 AMPULE: .5; 3 SOLUTION RESPIRATORY (INHALATION) at 10:05

## 2021-01-10 RX ADMIN — SODIUM CHLORIDE, PRESERVATIVE FREE 10 ML: 5 INJECTION INTRAVENOUS at 20:39

## 2021-01-10 RX ADMIN — SODIUM CHLORIDE, PRESERVATIVE FREE 10 ML: 5 INJECTION INTRAVENOUS at 20:30

## 2021-01-10 RX ADMIN — SODIUM CHLORIDE, PRESERVATIVE FREE 10 ML: 5 INJECTION INTRAVENOUS at 08:41

## 2021-01-10 RX ADMIN — BUDESONIDE 500 MCG: 0.5 SUSPENSION RESPIRATORY (INHALATION) at 06:14

## 2021-01-10 RX ADMIN — ASPIRIN 81 MG: 81 TABLET, COATED ORAL at 08:42

## 2021-01-10 RX ADMIN — BUDESONIDE 500 MCG: 0.5 SUSPENSION RESPIRATORY (INHALATION) at 10:05

## 2021-01-10 RX ADMIN — FLUTICASONE PROPIONATE 2 SPRAY: 50 SPRAY, METERED NASAL at 08:42

## 2021-01-10 RX ADMIN — BUDESONIDE 500 MCG: 0.5 SUSPENSION RESPIRATORY (INHALATION) at 18:20

## 2021-01-10 RX ADMIN — MIDODRINE HYDROCHLORIDE 5 MG: 5 TABLET ORAL at 08:42

## 2021-01-10 RX ADMIN — FUROSEMIDE 40 MG: 10 INJECTION, SOLUTION INTRAMUSCULAR; INTRAVENOUS at 09:47

## 2021-01-10 RX ADMIN — TAMSULOSIN HYDROCHLORIDE 0.4 MG: 0.4 CAPSULE ORAL at 20:29

## 2021-01-10 RX ADMIN — BUDESONIDE 500 MCG: 0.5 SUSPENSION RESPIRATORY (INHALATION) at 14:20

## 2021-01-10 RX ADMIN — MIDODRINE HYDROCHLORIDE 5 MG: 5 TABLET ORAL at 12:26

## 2021-01-10 RX ADMIN — ENOXAPARIN SODIUM 40 MG: 40 INJECTION SUBCUTANEOUS at 08:41

## 2021-01-10 ASSESSMENT — PAIN SCALES - GENERAL
PAINLEVEL_OUTOF10: 0

## 2021-01-10 NOTE — PROGRESS NOTES
Pulmonary Critical Care Progress Note  Michela Rogers MD     Patient seen for the follow up of  Acute respiratory failure due to COVID-19 [facility])     Subjective:  He is resting comfortably in bed on high flow. His shortness of breath is about the same. He has occasional productive cough. He denies any chest pain. His oral intake is fair. Examination:  Vitals: BP (!) 102/59   Pulse 88   Temp 98.7 °F (37.1 °C) (Oral)   Resp 25   Ht 5' 6\" (1.676 m)   Wt 133 lb 12.8 oz (60.7 kg)   SpO2 91%   BMI 21.60 kg/m²   General appearance:  alert and cooperative with exam, resting in bed  Neck: No JVD  Lungs: Moderate air exchange, no wheezing, rales or rhonchi at this time  Heart: regular rate and rhythm, S1, S2 normal, no gallop  Abdomen: Soft, non tender, + BS  Extremities: no cyanosis or clubbing.  No significant edema    LABs:  CBC:   Recent Labs     01/08/21  0326 01/10/21  0321   WBC 5.4 8.8   HGB 10.7* 11.9*   HCT 33.2* 36.4*   PLT 85* 100*     BMP:   Recent Labs     01/08/21  0326 01/10/21  0321   * 131*   K 4.9 4.4   CO2 41* 39*   BUN 18 25*   CREATININE <0.40* <0.40*   LABGLOM CANNOT BE CALCULATED CANNOT BE CALCULATED   GLUCOSE 123* 141*     Radiology:  1/10/2021      Impression:  · Acute hypoxic respiratory failure  · COVID-19 pneumonia  · Mild pulmonary edema/acute on chronic systolic heart failure  · History of pulmonary fibrosis  · Former smoker, possible COPD  · Moderate pulmonary hypertension, RVSP 50 mmHg  · Mild thrombocytopenia, resolved    Recommendations:  · Oxygen via high flow nasal cannula, wean as able, keep SPO2 greater than 92%  · BiPAP support as needed during the day and while asleep  · Seroquel 50 mg twice daily  · Incentive spirometry every hour while awake  · Prone as tolerated  · Decadron 6 mg daily  · Remdesivir  · Albuterol and Ipratropium Q 4 hours and prn  · Budesonide and Brovana via nebulizer  · Midodrine 5 mg 3 times daily  · Diuresis with IV Lasix 40 mg daily · Continue Esbriet  · Labs: CBC and BMP in am  · DVT prophylaxis with low molecular weight heparin, monitor platelets  · PUD prophylaxis with Pepcid  · DNR CCA  · Okay to transfer to stepdown  · Discussed with RN  · Will follow with you    Electronically signed by     Kesha Penn MD on 1/10/2021 at 1:39 PM  Pulmonary Critical Care and Sleep Medicine,  [facility]  Cell: [phone]  Office: [phone]

## 2021-01-10 NOTE — PROGRESS NOTES
[facility]  Office: [address], DO, Angelica Ramirez, DO, Consuelo Triplett, DO, Luci Gonzalez Blood, DO, Elodia Thompson MD, Leia Pop MD, Randa Page MD, Demarco Zapata MD, Natacha Garcia MD, Nory Chu MD, Amber Du MD, Ching Restrepo MD, Anuel Hagen MD, Luis Parks DO, Wilbur Bullock MD, Jayna Elmore MD, Bharathi Lloyd, DO, Charity Thompson MD,  Sabino Desouza DO, Minoo Terrell MD, Rosario Fairbanks MD, Peg Cruz, [facility], [address], [facility], Tawnya Bennett, CNP, Tom Martinez, [facility], Vinnie Vera, CNP, Giuliana Gipson, CNP, Andrea Garcia, CNP, Olga Chanel, CNP, Brooke Wheeler, CNP, Hilario García PA-C, Laine Mansfield, [facility], Alejandra Garcia, CNP, Sallie Sanchez, CNP, Yassine Crowe, CNP, Alisa Stout, CNP, Liu Mora    Progress Note    1/10/2021    4:13 PM    Name:   Emma Daigle  MRN:     [id number]     Acct:      [de-identified]   Room:   [address] [place] [address] Day:  11  Admit Date:  12/30/2020 10:47 AM    PCP:   Adalberto Christianson MD  Code Status:  DNR-CCA    Subjective:     C/C:   Chief Complaint   Patient presents with    Shortness of Breath     pt states gradually gotten worse over the past few months     Interval History Status: improved. Patient was seen and evaluated at bedside this morning.     Patient continues to be on high flow nasal cannula at this time,  Needing 50 L, 45% FiO2  ,         Brief History: Patient is a resident at assisted living facility. Patient reports that the facility has recently had an outbreak of Covid. Patient reports over the past 3 days he has been experiencing exertional fatigue followed rapidly by a persistent cough with exertional dyspnea over the past 24 hours. Patient reports that the symptoms are present in between his ADLs and he has been having increasing difficulty with breathing throughout the day. Patient reports to the freestanding emergency department by EMS where he was found to be profoundly hypoxic and was started on supplemental oxygen by nonrebreather mask at 15 L. Initial lab testing was completed and finds patient is Covid positive. Patient is admitted to the hospital for acute respiratory failure with hypoxia and will be treated aggressively with standard Covid therapy. Review of Systems:     Constitutional:  negative for chills, fevers, sweats  Respiratory: Still coughing, shortness of breath  Cardiovascular:  negative for chest pain,   Gastrointestinal:  negative for abdominal pain, constipation, diarrhea, nausea, vomiting  Neurological:  negative for dizziness, headache    Medications: Allergies:     Allergies   Allergen Reactions    Bactrim     Other      Sweet potatoes     Prednisone Other (See Comments)     Trouble voiding       Current Meds:   Scheduled Meds:    budesonide  0.5 mg Nebulization 4x daily    QUEtiapine  50 mg Oral BID    furosemide  40 mg Intravenous Daily    midodrine  5 mg Oral TID     sodium chloride flush  10 mL Intravenous 2 times per day    ipratropium-albuterol  1 ampule Inhalation Q4H WA    enoxaparin  40 mg Subcutaneous Daily    aspirin  81 mg Oral Daily    Pirfenidone  3 capsule Oral TID     famotidine  40 mg Oral Daily    fluticasone  2 spray Each Nostril Daily    tamsulosin  0.4 mg Oral Daily    Arformoterol Tartrate  15 mcg Nebulization BID    sodium chloride flush  10 mL Intravenous 2 times per day GLUCOSE 123* 141*   BUN 18 25*   CREATININE <0.40* <0.40*   ANIONGAP 5* 7*   LABGLOM CANNOT BE CALCULATED CANNOT BE CALCULATED   GFRAA CANNOT BE CALCULATED CANNOT BE CALCULATED   CALCIUM 8.8 9.2     Recent Labs     01/10/21  0321   PROT 6.5   LABALBU 3.4*   AST 24   ALT 32   ALKPHOS 65   BILITOT 0.29*     ABG:No results found for: POCPH, PHART, PH, POCPCO2, NRA3PMC, PCO2, POCPO2, PO2ART, PO2, POCHCO3, VKI2ZFJ, HCO3, NBEA, PBEA, BEART, BE, THGBART, THB, QGO5ADR, XAKZ4YVN, M4BWLJDE, O2SAT, FIO2  Lab Results   Component Value Date/Time    [facility] 12/30/2020 12:45 PM     Lab Results   Component Value Date/Time    CULTURE NO GROWTH 6 DAYS 12/30/2020 12:45 PM       Radiology:  Xr Chest Portable    Result Date: 1/1/2021  No significant interval change. Diffuse bilateral patchy airspace opacity and interstitial thickening. Xr Chest Portable    Result Date: 12/30/2020  Patchy diffuse bilateral pulmonary opacification most consistent with multifocal pneumonia including atypical viral pneumonia.        Physical Examination:        General appearance:  alert, cooperative and no distress  Mental Status:  oriented to person, place and time and normal affect  Lungs: Bilateral coarse breath sounds  Heart:  regular rate and rhythm, no murmur  Abdomen:  soft, NT  Extremities:  no edema, redness,  Skin:  no gross lesions, rashes, induration    Assessment:        Hospital Problems           Last Modified POA    * (Principal) Acute respiratory failure due to COVID-19 ([facility] [address] 1/6/2021 Yes    COPD (chronic obstructive pulmonary disease) ([facility] [address] 12/30/2020 Yes    Dyslipidemia 12/30/2020 Yes    Pulmonary interstitial fibrosis ([facility] [address] 12/30/2020 Yes    Pneumonia due to COVID-19 virus 1/6/2021 Yes    Gastroesophageal reflux disease without esophagitis 12/30/2020 Yes    Former smoker 12/30/2020 Yes    Benign prostatic hyperplasia 12/30/2020 Yes    Anxiety 12/30/2020 Yes Acute-on-chronic respiratory failure ([facility] [address] 12/30/2020 Yes    COVID-19 12/30/2020 Yes    SIRS (systemic inflammatory response syndrome) ([facility] [address] 12/30/2020 Yes    [facility] 1/6/2021 Yes          Plan:        1. Acute respiratory failure, sepsis due to Covid, status post remdesivir, dexamethasone, S/p convalescent plasma  ,  2. Heated high flow O2 as well as adult BiPAP protocol at respiratory discretion  3. Midodrine added due to hypotension  4. Maintain indwelling urinary catheter for BPH with urinary retention as well as Flomax  5. PPI for GERD, Lipitor for dyslipidemia  6. Anxiety management with home medication as well as Xanax  7. Consult pulmonology  8.  Lovenox 30 twice daily for anticoagulation with Kiara Hollis MD  1/10/2021  4:13 PM

## 2021-01-10 NOTE — PLAN OF CARE
Problem: Airway Clearance - Ineffective  Goal: Achieve or maintain patent airway  Outcome: Ongoing     Problem: Gas Exchange - Impaired  Goal: Absence of hypoxia  Outcome: Ongoing     Problem: Gas Exchange - Impaired  Goal: Promote optimal lung function  Outcome: Ongoing     Problem: Breathing Pattern - Ineffective  Goal: Ability to achieve and maintain a regular respiratory rate  Outcome: Ongoing   Remains SOB with activity, LS diminished t/o, remains on HFNC fio2 45% 40 Liters, IS 1000ml encourage C&DB, refuses to prone    Problem: Nutrition Deficits  Goal: Optimize nutrtional status  Outcome: Ongoing   Pt ate 50% of meals    Problem: Skin Integrity:  Goal: Will show no infection signs and symptoms  Description: Will show no infection signs and symptoms  Outcome: Ongoing     Problem: Skin Integrity:  Goal: Absence of new skin breakdown  Description: Absence of new skin breakdown  Outcome: Ongoing  No s/s of further skin breakdown noted, encourage to repositioned every 2hours    Problem: Falls - Risk of:  Goal: Will remain free from falls  Description: Will remain free from falls  Outcome: Ongoing     Problem: Falls - Risk of:  Goal: Absence of physical injury  Description: Absence of physical injury  Outcome: Ongoing   Pt free from falls and injury

## 2021-01-11 LAB
C-REACTIVE PROTEIN: 11.1 MG/L (ref 0–5)
D-DIMER QUANTITATIVE: 0.42 MG/L FEU (ref 0–0.59)

## 2021-01-11 PROCEDURE — 99232 SBSQ HOSP IP/OBS MODERATE 35: CPT | Performed by: INTERNAL MEDICINE

## 2021-01-11 PROCEDURE — 6370000000 HC RX 637 (ALT 250 FOR IP): Performed by: NURSE PRACTITIONER

## 2021-01-11 PROCEDURE — 6360000002 HC RX W HCPCS: Performed by: NURSE PRACTITIONER

## 2021-01-11 PROCEDURE — 2000000000 HC ICU R&B

## 2021-01-11 PROCEDURE — 2580000003 HC RX 258: Performed by: NURSE PRACTITIONER

## 2021-01-11 PROCEDURE — 94640 AIRWAY INHALATION TREATMENT: CPT

## 2021-01-11 PROCEDURE — 2580000003 HC RX 258: Performed by: INTERNAL MEDICINE

## 2021-01-11 PROCEDURE — 6360000002 HC RX W HCPCS: Performed by: INTERNAL MEDICINE

## 2021-01-11 PROCEDURE — 36415 COLL VENOUS BLD VENIPUNCTURE: CPT

## 2021-01-11 PROCEDURE — 6370000000 HC RX 637 (ALT 250 FOR IP): Performed by: INTERNAL MEDICINE

## 2021-01-11 PROCEDURE — 97530 THERAPEUTIC ACTIVITIES: CPT

## 2021-01-11 PROCEDURE — 97110 THERAPEUTIC EXERCISES: CPT

## 2021-01-11 PROCEDURE — 85379 FIBRIN DEGRADATION QUANT: CPT

## 2021-01-11 PROCEDURE — 94761 N-INVAS EAR/PLS OXIMETRY MLT: CPT

## 2021-01-11 PROCEDURE — 2700000000 HC OXYGEN THERAPY PER DAY

## 2021-01-11 PROCEDURE — 86140 C-REACTIVE PROTEIN: CPT

## 2021-01-11 RX ADMIN — BUDESONIDE 500 MCG: 0.5 SUSPENSION RESPIRATORY (INHALATION) at 18:12

## 2021-01-11 RX ADMIN — FLUTICASONE PROPIONATE 2 SPRAY: 50 SPRAY, METERED NASAL at 08:07

## 2021-01-11 RX ADMIN — IPRATROPIUM BROMIDE AND ALBUTEROL SULFATE 1 AMPULE: .5; 3 SOLUTION RESPIRATORY (INHALATION) at 11:40

## 2021-01-11 RX ADMIN — FUROSEMIDE 40 MG: 10 INJECTION, SOLUTION INTRAMUSCULAR; INTRAVENOUS at 08:07

## 2021-01-11 RX ADMIN — Medication 6000 UNITS: at 08:08

## 2021-01-11 RX ADMIN — SODIUM CHLORIDE, PRESERVATIVE FREE 10 ML: 5 INJECTION INTRAVENOUS at 20:34

## 2021-01-11 RX ADMIN — ARFORMOTEROL TARTRATE 15 MCG: 15 SOLUTION RESPIRATORY (INHALATION) at 06:06

## 2021-01-11 RX ADMIN — MIDODRINE HYDROCHLORIDE 5 MG: 5 TABLET ORAL at 08:07

## 2021-01-11 RX ADMIN — QUETIAPINE FUMARATE 50 MG: 25 TABLET ORAL at 20:32

## 2021-01-11 RX ADMIN — ALPRAZOLAM 0.25 MG: 0.25 TABLET ORAL at 14:49

## 2021-01-11 RX ADMIN — Medication 50 MG: at 08:07

## 2021-01-11 RX ADMIN — BUDESONIDE 500 MCG: 0.5 SUSPENSION RESPIRATORY (INHALATION) at 06:06

## 2021-01-11 RX ADMIN — SODIUM CHLORIDE, PRESERVATIVE FREE 10 ML: 5 INJECTION INTRAVENOUS at 20:32

## 2021-01-11 RX ADMIN — TAMSULOSIN HYDROCHLORIDE 0.4 MG: 0.4 CAPSULE ORAL at 20:32

## 2021-01-11 RX ADMIN — IPRATROPIUM BROMIDE AND ALBUTEROL SULFATE 1 AMPULE: .5; 3 SOLUTION RESPIRATORY (INHALATION) at 18:12

## 2021-01-11 RX ADMIN — BUDESONIDE 500 MCG: 0.5 SUSPENSION RESPIRATORY (INHALATION) at 11:40

## 2021-01-11 RX ADMIN — MIDODRINE HYDROCHLORIDE 5 MG: 5 TABLET ORAL at 18:05

## 2021-01-11 RX ADMIN — SODIUM CHLORIDE, PRESERVATIVE FREE 10 ML: 5 INJECTION INTRAVENOUS at 08:09

## 2021-01-11 RX ADMIN — ASPIRIN 81 MG: 81 TABLET, COATED ORAL at 08:07

## 2021-01-11 RX ADMIN — FAMOTIDINE 40 MG: 20 TABLET, FILM COATED ORAL at 20:32

## 2021-01-11 RX ADMIN — IPRATROPIUM BROMIDE AND ALBUTEROL SULFATE 1 AMPULE: .5; 3 SOLUTION RESPIRATORY (INHALATION) at 06:06

## 2021-01-11 RX ADMIN — MIDODRINE HYDROCHLORIDE 5 MG: 5 TABLET ORAL at 12:21

## 2021-01-11 RX ADMIN — ARFORMOTEROL TARTRATE 15 MCG: 15 SOLUTION RESPIRATORY (INHALATION) at 18:12

## 2021-01-11 RX ADMIN — QUETIAPINE FUMARATE 50 MG: 25 TABLET ORAL at 08:08

## 2021-01-11 RX ADMIN — ENOXAPARIN SODIUM 40 MG: 40 INJECTION SUBCUTANEOUS at 08:08

## 2021-01-11 ASSESSMENT — PAIN SCALES - GENERAL
PAINLEVEL_OUTOF10: 0

## 2021-01-11 NOTE — PLAN OF CARE
Problem: Airway Clearance - Ineffective  Goal: Achieve or maintain patent airway  Outcome: Ongoing     Problem: Gas Exchange - Impaired  Goal: Absence of hypoxia  Outcome: Ongoing     Problem: Gas Exchange - Impaired  Goal: Promote optimal lung function  Outcome: Ongoing     Problem: Breathing Pattern - Ineffective  Goal: Ability to achieve and maintain a regular respiratory rate  Outcome: Ongoing   Continues HFNC fio2 increased from 40 to 45% t/o day d/t increase SOB and RR, pt wore Bipap 16/2 @ 40% x2 hours, LS diminished T/O with fine crackles noted Bases, medicated per [facility] for anxiety, C&DB, -500 poor effort noted, breathing tech reviewed and pt demonstrated an understanding.     Problem: Nutrition Deficits  Goal: Optimize nutrtional status  Outcome: Ongoing   Pt ate 50% of diet and protein shakes    Problem: Skin Integrity:  Goal: Will show no infection signs and symptoms  Description: Will show no infection signs and symptoms  Outcome: Ongoing     Problem: Skin Integrity:  Goal: Absence of new skin breakdown  Description: Absence of new skin breakdown  Outcome: Ongoing   No s/s of further skin breakdown, continue skin care and turns every 2 hours    Problem: Skin Integrity:  Goal: Will show no infection signs and symptoms  Description: Will show no infection signs and symptoms  Outcome: Ongoing     Problem: Skin Integrity:  Problem: Falls - Risk of:  Goal: Will remain free from falls  Description: Will remain free from falls  Outcome: Ongoing     Problem: Falls - Risk of:  Goal: Will remain free from falls  Description: Will remain free from falls  Outcome: Ongoing     Problem: Falls - Risk of:  Goal: Absence of physical injury  Description: Absence of physical injury  Outcome: Ongoing   Free from falls and injury, call light in reach and reviewed, bed brakes on, bed low position, up with assist and walker  Goal: Absence of new skin breakdown  Description: Absence of new skin breakdown Outcome: Ongoing

## 2021-01-11 NOTE — PROGRESS NOTES
Comprehensive Nutrition Assessment    Type and Reason for Visit:  Reassess    Nutrition Recommendations/Plan: Continue General diet and supplements as ordered. Nutrition Assessment:  PPatient stable nutritionally, consuming % of meals and supplements. Diet advanced to General, receiving high calorie high protein supplements, consuming most of it. Noted weight loss likely due to NPO status and risk of aspiration before. Continue General diet and oral supplements as ordered. Malnutrition Assessment:  Malnutrition Status: At risk for malnutrition (Comment)    Context:  Acute Illness     Findings of the 6 clinical characteristics of malnutrition:  Energy Intake:  1 - 75% or less of estimated energy requirements for 7 or more days  Weight Loss:  Unable to assess     Body Fat Loss:  Unable to assess     Muscle Mass Loss:  Unable to assess    Fluid Accumulation:  No significant fluid accumulation Extremities   Strength:  Not Performed    Estimated Daily Nutrient Needs:  Energy (kcal):  9908-7347 kcal based on 28-30 kcal/kg; Weight Used for Energy Requirements:  Current     Protein (g):  80-93 gm based on 1.3-1.5 gm/kg; Weight Used for Protein Requirements:  Current        Fluid (ml/day):   ; Method Used for Fluid Requirements:         Nutrition Related Findings:  No edema. Bowel sounds active. COVID-19      Wounds:  None       Current Nutrition Therapies:    Dietary Nutrition Supplements: Standard High Calorie Oral Supplement, Frozen Oral Supplement  DIET GENERAL;     Anthropometric Measures:  · Height: 5' 6\" (167.6 cm)  · Current Body Weight: 133 lb 12.8 oz (60.7 kg)   · Admission Body Weight:      · Usual Body Weight:       · Ideal Body Weight: 142 lbs; % Ideal Body Weight 95.8 %   · BMI: 21.6  · Adjusted Body Weight:  ;     · Adjusted BMI:      · BMI Categories: Underweight (BMI less than 22) age over 72       Nutrition Diagnosis: · Inadequate protein-energy intake related to impaired respiratory function, swallowing difficulty as evidenced by poor intake prior to admission      Nutrition Interventions:   Food and/or Nutrient Delivery:  Continue Current Diet, Continue Oral Nutrition Supplement  Nutrition Education/Counseling:  Education not indicated   Coordination of Nutrition Care:  Continue to monitor while inpatient    Goals:  PO intakes are greater than 50% at meals       Nutrition Monitoring and Evaluation:   Food/Nutrient Intake Outcomes:  Food and Nutrient Intake, Diet Advancement/Tolerance, Supplement Intake  Physical Signs/Symptoms Outcomes:  Biochemical Data, Fluid Status or Edema, Skin, Weight, Chewing or Swallowing     Discharge Planning:     Too soon to determine       Some areas of assessment may be incomplete due to COVID-19 precautions    Balbina Abraham RD, LD  Office phone [phone]

## 2021-01-11 NOTE — PROGRESS NOTES
[facility]  Office: [address], DO, Sandra Hackett, DO, Winston Bassett, DO, Curt Jordan Blood, DO, Jose Gonsales MD, Tessa Chandra MD, Rosa Elena Mayfield MD, Gonsalo Carroll MD, Theron Duncan MD, Diaz Magana MD, Estela Kumar MD, Edgardo Guy MD, Anuel Amador MD, Aaron Taylor DO, Marco Leary MD, Michelle Rothman MD, Oriana Lay, DO, Landen Kim MD,  Avani Vega DO, Dennie Skinner, MD, Roshan Tom MD, Yaneli Wang, [facility], [facility], CNP, Brayan Adkins, CNP, Axel Gray, CNS, Arin Stovall, CNP, Priya Miller, CNP, Xuan Oreilly, CNP, Casper Harris, CNP, Leonardo Standard, CNP, PHILOMENA Hanna-C, Jhonny Somers, [facility], Shraddha Second, CNP, Asia Cord, CNP, Jose Contras, CNP, Paul East, CNP, Tyra Meth, [place] [facility]    Progress Note    1/11/2021    6:21 PM    Name:   Eldon Lew  MRN:     [id number]     Acct:      [de-identified]   Room:   [address] [place] [address] Day:  12  Admit Date:  12/30/2020 10:47 AM    PCP:   Jaz Hickey MD  Code Status:  DNR-CCA    Subjective:     C/C:   Chief Complaint   Patient presents with    Shortness of Breath     pt states gradually gotten worse over the past few months     Interval History Status: improved. Patient was seen and evaluated at bedside this morning.     Patient continues to be on high flow nasal cannula at this time,  Needing 40 L, 40% FiO2  Improving slowly   ,         Brief History:  Vitamin D  6,000 Units Oral Daily    zinc sulfate  50 mg Oral Daily     Continuous Infusions:    dexmedetomidine (PRECEDEX) IV infusion Stopped (21 0500)    sodium chloride       PRN Meds: polyethylene glycol, sodium chloride flush, ALPRAZolam, calcium carbonate, albuterol, sodium chloride flush, potassium chloride **OR** potassium alternative oral replacement **OR** potassium chloride, magnesium sulfate, promethazine **OR** ondansetron, nicotine, acetaminophen **OR** acetaminophen, sodium chloride, guaiFENesin-dextromethorphan, sodium chloride    Data:     Past Medical History:   has a past medical history of Asthma, COPD (chronic obstructive pulmonary disease) ([facility] [address] and Pulmonary fibrosis ([facility] [address] Social History:   reports that he quit smoking about 53 years ago. He has never used smokeless tobacco. He reports that he does not drink alcohol or use drugs. Family History:   Family History   Problem Relation Age of Onset    Heart Attack Mother     Heart Attack Father        Vitals:  /60   Pulse 89   Temp 98.2 °F (36.8 °C) (Oral)   Resp 28   Ht 5' 6\" (1.676 m)   Wt 133 lb 12.8 oz (60.7 kg)   SpO2 95%   BMI 21.60 kg/m²   Temp (24hrs), Av.9 °F (36.6 °C), Min:97.4 °F (36.3 °C), Max:98.2 °F (36.8 °C)    No results for input(s): POCGLU in the last 72 hours. I/O (24Hr):     Intake/Output Summary (Last 24 hours) at 2021 1821  Last data filed at 2021 1800  Gross per 24 hour   Intake 800 ml   Output 1705 ml   Net -905 ml       Labs:  Hematology:  Recent Labs     21  0643 01/10/21  0321 21  0507   WBC  --  8.8  --    RBC  --  4.03*  --    HGB  --  11.9*  --    HCT  --  36.4*  --    MCV  --  90.3  --    MCH  --  29.5  --    MCHC  --  32.7  --    RDW  --  14.6*  --    PLT  --  100*  --    MPV  --  12.5  --    CRP 12.5*  --  11.1*   DDIMER 0.47  --  0.42     Chemistry:  Recent Labs     01/10/21  0321   *   K 4.4   CL 85*   CO2 39*   GLUCOSE 141*   BUN 25* CREATININE <0.40*   ANIONGAP 7*   LABGLOM CANNOT BE CALCULATED   GFRAA CANNOT BE CALCULATED   CALCIUM 9.2     Recent Labs     01/10/21  0321   PROT 6.5   LABALBU 3.4*   AST 24   ALT 32   ALKPHOS 65   BILITOT 0.29*     ABG:No results found for: POCPH, PHART, PH, POCPCO2, NCA2GAP, PCO2, POCPO2, PO2ART, PO2, POCHCO3, FZK5CON, HCO3, NBEA, PBEA, BEART, BE, THGBART, THB, CFM7LYK, FHTS3XHE, K4MLUCIL, O2SAT, FIO2  Lab Results   Component Value Date/Time    [facility] 12/30/2020 12:45 PM     Lab Results   Component Value Date/Time    CULTURE NO GROWTH 6 DAYS 12/30/2020 12:45 PM       Radiology:  Xr Chest Portable    Result Date: 1/1/2021  No significant interval change. Diffuse bilateral patchy airspace opacity and interstitial thickening. Xr Chest Portable    Result Date: 12/30/2020  Patchy diffuse bilateral pulmonary opacification most consistent with multifocal pneumonia including atypical viral pneumonia.        Physical Examination:        General appearance:  alert, cooperative and no distress  Mental Status:  oriented to person, place and time and normal affect  Lungs: Bilateral coarse breath sounds  Heart:  regular rate and rhythm, no murmur  Abdomen:  soft, NT  Extremities:  no edema, redness,  Skin:  no gross lesions, rashes, induration    Assessment:        Hospital Problems           Last Modified POA    * (Principal) Acute respiratory failure due to COVID-19 ([address] 1/6/2021 Yes    COPD (chronic obstructive pulmonary disease) ([address] 12/30/2020 Yes    Dyslipidemia 12/30/2020 Yes    Pulmonary interstitial fibrosis ([address] 12/30/2020 Yes    Pneumonia due to COVID-19 virus 1/6/2021 Yes    Gastroesophageal reflux disease without esophagitis 12/30/2020 Yes    Former smoker 12/30/2020 Yes    Benign prostatic hyperplasia 12/30/2020 Yes    Anxiety 12/30/2020 Yes    Acute-on-chronic respiratory failure ([address] 12/30/2020 Yes    COVID-19 12/30/2020 Yes SIRS (systemic inflammatory response syndrome) ([facility] 12/30/2020 Yes    Sepsis due to COVID-19 ([facility] [address] [facility] 1/6/2021 Yes          Plan:        1. Acute respiratory failure, sepsis due to Covid, status post remdesivir, dexamethasone, S/p convalescent plasma  ,  2. Heated high flow O2 as well as adult BiPAP protocol at respiratory discretion  3. Midodrine added due to hypotension  4. Maintain indwelling urinary catheter for BPH with urinary retention as well as Flomax  5. PPI for GERD, Lipitor for dyslipidemia  6. Anxiety management with home medication as well as Xanax  7. Consult pulmonology  8.  Lovenox 30 twice daily for anticoagulation with Covid    Will chat with him about DC to [facility] if accepted       Cassandra Simental MD  1/11/2021  6:21 PM

## 2021-01-11 NOTE — PLAN OF CARE
Nutrition Problem #1: Inadequate protein-energy intake  Intervention: Food and/or Nutrient Delivery: Continue Current Diet, Continue Oral Nutrition Supplement  Nutritional Goals: PO intakes are greater than 50% at meals

## 2021-01-11 NOTE — PROGRESS NOTES
Pulmonary Critical Care Progress Note  Sanford Camacho MD     Patient seen for the follow up of  Acute respiratory failure due to COVID-19 [facility])     Subjective:  He did sit up in the chair but now he is lying down in the recliner. He is still on high flow. His shortness of breath is about the same. He has occasional productive cough. He denies any chest pain. His oral intake is fair. Examination:  Vitals: BP 91/65   Pulse 85   Temp 98 °F (36.7 °C) (Oral)   Resp 23   Ht 5' 6\" (1.676 m)   Wt 133 lb 12.8 oz (60.7 kg)   SpO2 90%   BMI 21.60 kg/m²   General appearance:  alert and cooperative with exam,  in recliner  Neck: No JVD  Lungs: Moderate air exchange, no wheezing, rales or rhonchi at this time  Heart: regular rate and rhythm, S1, S2 normal, no gallop  Abdomen: Soft, non tender, + BS  Extremities: no cyanosis or clubbing.  No significant edema    LABs:  CBC:   Recent Labs     01/10/21  0321   WBC 8.8   HGB 11.9*   HCT 36.4*   *     BMP:   Recent Labs     01/10/21  0321   *   K 4.4   CO2 39*   BUN 25*   CREATININE <0.40*   LABGLOM CANNOT BE CALCULATED   GLUCOSE 141*     Radiology:  1/10/2021      Impression:  · Acute hypoxic respiratory failure  · COVID-19 pneumonia  · Mild pulmonary edema/acute on chronic systolic heart failure  · History of pulmonary fibrosis  · Former smoker, possible COPD  · Moderate pulmonary hypertension, RVSP 50 mmHg  · Mild thrombocytopenia, resolved    Recommendations:  · Oxygen via high flow nasal cannula, wean as able, keep SPO2 greater than 92%  · BiPAP support as needed during the day and while asleep  · Seroquel 50 mg twice daily  · Incentive spirometry every hour while awake  · Prone as tolerated  · Status post Decadron and remdesivir   · albuterol and Ipratropium Q 4 hours and prn  · Budesonide and Brovana via nebulizer  · Midodrine 5 mg 3 times daily  · Diuresis with IV Lasix 40 mg daily  · Continue Esbriet  · Labs: CBC and BMP in am

## 2021-01-11 NOTE — PROGRESS NOTES
Occupational Therapy  DATE: 2021    NAME: Emma Daigle  MRN: [id number]   : 1933  [facility]  Occupational Therapy Not Seen Note    Patient not available for Occupational Therapy due to:    [] Testing:    [] Hemodialysis    [] Cancelled by RN:    []Refusal by Patient:    [] Surgery:     [] Intubation:     [] Pain Medication:    [] Sedation:     [] Spine Precautions :    [] Medical Instability:    [] Other: RN reported patient is more winded this date, exercises seated in chair only. PT in to do exercises at this time. Will continue to follow.        BALA Aiken

## 2021-01-11 NOTE — PROGRESS NOTES
Physical Therapy  Facility/Department: [facility] ICU  Daily Treatment Note  NAME: Lucila Lobo  : 1933  MRN: [id number]    Date of Service: 2021    Discharge Recommendations:  Subacute/Skilled Nursing Facility        Assessment   Body structures, Functions, Activity limitations: Decreased functional mobility ; Decreased endurance;Decreased strength;Decreased balance  Assessment: Pt with deficits of bed mobility, transfers, ambulation, balance, Poor safety awareness and endurance this session. Pt requires 2 assist for safe bed mboility,  transfers & gait,  With current deficits, recommend SNF at D/C to return to baseline function and a safe D/C back to home environment. Pt requires continued PT to maximize independence with functional mobility, balance, safety awareness & activity tolerance. Prognosis: Good  Decision Making: Medium Complexity  Exam: functional mobility, activity tolerance, Balance, & [employer] AM-PAC 6 Clicks Basic Mobility  Clinical Presentation: evolving  PT Education: PT Role;Plan of Care;General Safety; Energy Conservation  Patient Education: Patient educated on the importance of PT to promote functional mobility and enhace endurane to promote gait activities. REQUIRES PT FOLLOW UP: Yes  Activity Tolerance  Activity Tolerance: Patient limited by endurance  Activity Tolerance: poor tolerance of activity with saO2 rangeing from 82-90% on hi flow NC     Patient Diagnosis(es): The primary encounter diagnosis was COVID-19. Diagnoses of Pulmonary fibrosis ([address] and Pneumonia due to organism were also pertinent to this visit. has a past medical history of Asthma, COPD (chronic obstructive pulmonary disease) ([address] and Pulmonary fibrosis ([facility] [address] has a past surgical history that includes pre-malignant / benign skin lesion excision (2017).     Restrictions  Restrictions/Precautions  Restrictions/Precautions: General Precautions, Contact Precautions Short term goal 2: Inc gait to amb 150ft or > indep w/ RW to enable pt to return to previous level of independence  Short term goal 3: Inc strength to [address] standing balance to good with device to facilitate pt independence for performance of ADL's & functional mobility, & reduce fall risk; Short term goal 4: Pt able to tolerate 30-40 min of activity to include 15-20 reps of ex & functional mobility including 5 minutes of standing to facilitate activity tolerance to [facility]; Short term goal 5: Ed use of incentive spirometer & optimal breathing techniques, ex's with incorporation of breathing techniques, posture & positioning for optimal breathing &  mental training ex's & issue written pt education  Patient Goals   Patient goals : Back to Rehab then to [facility].     Plan    Plan  Times per week: 1-2x/day,6-7 days/week  Current Treatment Recommendations: Strengthening, Balance Training, Endurance Training, Gait Training, Functional Mobility Training, Transfer Training, Stair training  Safety Devices  Type of devices: Nurse notified, Call light within reach, Left in chair, Gait belt, All fall risk precautions in place  Restraints  Initially in place: No     Therapy Time   Individual Concurrent Group Co-treatment   Time In 1455         Time Out 1525         Minutes [address] PTA

## 2021-01-11 NOTE — CARE COORDINATION
Spoke with RN. Dr. Ced Neville wants to keep the patient because they just weaned patient down to 40%. Will reevaluate tomorrow. Roxanna clemente.

## 2021-01-12 LAB
ABSOLUTE EOS #: 0 K/UL (ref 0–0.4)
ABSOLUTE IMMATURE GRANULOCYTE: 1.55 K/UL (ref 0–0.3)
ABSOLUTE LYMPH #: 1.42 K/UL (ref 1–4.8)
ABSOLUTE MONO #: 2.19 K/UL (ref 0.2–0.8)
ANION GAP SERPL CALCULATED.3IONS-SCNC: 6 MMOL/L (ref 9–17)
BASOPHILS # BLD: 0 %
BASOPHILS ABSOLUTE: 0 K/UL (ref 0–0.2)
BUN BLDV-MCNC: 31 MG/DL (ref 8–23)
BUN/CREAT BLD: ABNORMAL (ref 9–20)
CALCIUM SERPL-MCNC: 8.9 MG/DL (ref 8.6–10.4)
CHLORIDE BLD-SCNC: 82 MMOL/L (ref 98–107)
CO2: 43 MMOL/L (ref 20–31)
CREAT SERPL-MCNC: <0.4 MG/DL (ref 0.7–1.2)
DIFFERENTIAL TYPE: ABNORMAL
EOSINOPHILS RELATIVE PERCENT: 0 % (ref 1–4)
GFR AFRICAN AMERICAN: ABNORMAL ML/MIN
GFR NON-AFRICAN AMERICAN: ABNORMAL ML/MIN
GFR SERPL CREATININE-BSD FRML MDRD: ABNORMAL ML/MIN/{1.73_M2}
GFR SERPL CREATININE-BSD FRML MDRD: ABNORMAL ML/MIN/{1.73_M2}
GLUCOSE BLD-MCNC: 119 MG/DL (ref 70–99)
HCT VFR BLD CALC: 36 % (ref 40.7–50.3)
HEMOGLOBIN: 11.4 G/DL (ref 13–17)
IMMATURE GRANULOCYTES: 12 %
LYMPHOCYTES # BLD: 11 % (ref 24–44)
MCH RBC QN AUTO: 29.2 PG (ref 25.2–33.5)
MCHC RBC AUTO-ENTMCNC: 31.7 G/DL (ref 28.4–34.8)
MCV RBC AUTO: 92.1 FL (ref 82.6–102.9)
MONOCYTES # BLD: 17 % (ref 1–7)
NRBC AUTOMATED: 0 PER 100 WBC
PDW BLD-RTO: 15.1 % (ref 11.8–14.4)
PLATELET # BLD: 95 K/UL (ref 138–453)
PLATELET ESTIMATE: ABNORMAL
PMV BLD AUTO: 11.1 FL (ref 8.1–13.5)
POTASSIUM SERPL-SCNC: 3.9 MMOL/L (ref 3.7–5.3)
RBC # BLD: 3.91 M/UL (ref 4.21–5.77)
RBC # BLD: ABNORMAL 10*6/UL
SEG NEUTROPHILS: 60 % (ref 36–66)
SEGMENTED NEUTROPHILS ABSOLUTE COUNT: 7.74 K/UL (ref 1.8–7.7)
SODIUM BLD-SCNC: 131 MMOL/L (ref 135–144)
WBC # BLD: 12.9 K/UL (ref 3.5–11.3)
WBC # BLD: ABNORMAL 10*3/UL

## 2021-01-12 PROCEDURE — 97116 GAIT TRAINING THERAPY: CPT

## 2021-01-12 PROCEDURE — 6370000000 HC RX 637 (ALT 250 FOR IP): Performed by: INTERNAL MEDICINE

## 2021-01-12 PROCEDURE — 6370000000 HC RX 637 (ALT 250 FOR IP): Performed by: NURSE PRACTITIONER

## 2021-01-12 PROCEDURE — 2580000003 HC RX 258: Performed by: INTERNAL MEDICINE

## 2021-01-12 PROCEDURE — 6360000002 HC RX W HCPCS: Performed by: NURSE PRACTITIONER

## 2021-01-12 PROCEDURE — 80048 BASIC METABOLIC PNL TOTAL CA: CPT

## 2021-01-12 PROCEDURE — 94640 AIRWAY INHALATION TREATMENT: CPT

## 2021-01-12 PROCEDURE — 36415 COLL VENOUS BLD VENIPUNCTURE: CPT

## 2021-01-12 PROCEDURE — 97530 THERAPEUTIC ACTIVITIES: CPT

## 2021-01-12 PROCEDURE — 99232 SBSQ HOSP IP/OBS MODERATE 35: CPT | Performed by: INTERNAL MEDICINE

## 2021-01-12 PROCEDURE — 97535 SELF CARE MNGMENT TRAINING: CPT

## 2021-01-12 PROCEDURE — 6360000002 HC RX W HCPCS: Performed by: INTERNAL MEDICINE

## 2021-01-12 PROCEDURE — 2000000000 HC ICU R&B

## 2021-01-12 PROCEDURE — 2580000003 HC RX 258: Performed by: NURSE PRACTITIONER

## 2021-01-12 PROCEDURE — 85025 COMPLETE CBC W/AUTO DIFF WBC: CPT

## 2021-01-12 PROCEDURE — 2700000000 HC OXYGEN THERAPY PER DAY

## 2021-01-12 PROCEDURE — 94660 CPAP INITIATION&MGMT: CPT

## 2021-01-12 PROCEDURE — 94761 N-INVAS EAR/PLS OXIMETRY MLT: CPT

## 2021-01-12 PROCEDURE — 97112 NEUROMUSCULAR REEDUCATION: CPT

## 2021-01-12 RX ORDER — LATANOPROST 50 UG/ML
1 SOLUTION/ DROPS OPHTHALMIC NIGHTLY
Status: DISCONTINUED | OUTPATIENT
Start: 2021-01-12 | End: 2021-01-14 | Stop reason: HOSPADM

## 2021-01-12 RX ADMIN — LATANOPROST 1 DROP: 50 SOLUTION OPHTHALMIC at 04:18

## 2021-01-12 RX ADMIN — IPRATROPIUM BROMIDE AND ALBUTEROL SULFATE 1 AMPULE: .5; 3 SOLUTION RESPIRATORY (INHALATION) at 20:59

## 2021-01-12 RX ADMIN — MIDODRINE HYDROCHLORIDE 5 MG: 5 TABLET ORAL at 12:30

## 2021-01-12 RX ADMIN — QUETIAPINE FUMARATE 50 MG: 25 TABLET ORAL at 09:00

## 2021-01-12 RX ADMIN — TAMSULOSIN HYDROCHLORIDE 0.4 MG: 0.4 CAPSULE ORAL at 20:21

## 2021-01-12 RX ADMIN — ASPIRIN 81 MG: 81 TABLET, COATED ORAL at 09:00

## 2021-01-12 RX ADMIN — SODIUM CHLORIDE, PRESERVATIVE FREE 10 ML: 5 INJECTION INTRAVENOUS at 09:00

## 2021-01-12 RX ADMIN — ARFORMOTEROL TARTRATE 15 MCG: 15 SOLUTION RESPIRATORY (INHALATION) at 21:00

## 2021-01-12 RX ADMIN — BUDESONIDE 500 MCG: 0.5 SUSPENSION RESPIRATORY (INHALATION) at 06:11

## 2021-01-12 RX ADMIN — IPRATROPIUM BROMIDE AND ALBUTEROL SULFATE 1 AMPULE: .5; 3 SOLUTION RESPIRATORY (INHALATION) at 06:11

## 2021-01-12 RX ADMIN — BUDESONIDE 500 MCG: 0.5 SUSPENSION RESPIRATORY (INHALATION) at 14:05

## 2021-01-12 RX ADMIN — IPRATROPIUM BROMIDE AND ALBUTEROL SULFATE 1 AMPULE: .5; 3 SOLUTION RESPIRATORY (INHALATION) at 10:30

## 2021-01-12 RX ADMIN — QUETIAPINE FUMARATE 50 MG: 25 TABLET ORAL at 20:21

## 2021-01-12 RX ADMIN — FAMOTIDINE 40 MG: 20 TABLET, FILM COATED ORAL at 20:21

## 2021-01-12 RX ADMIN — ARFORMOTEROL TARTRATE 15 MCG: 15 SOLUTION RESPIRATORY (INHALATION) at 06:18

## 2021-01-12 RX ADMIN — FLUTICASONE PROPIONATE 2 SPRAY: 50 SPRAY, METERED NASAL at 09:00

## 2021-01-12 RX ADMIN — IPRATROPIUM BROMIDE AND ALBUTEROL SULFATE 1 AMPULE: .5; 3 SOLUTION RESPIRATORY (INHALATION) at 14:05

## 2021-01-12 RX ADMIN — Medication 6000 UNITS: at 09:00

## 2021-01-12 RX ADMIN — BUDESONIDE 500 MCG: 0.5 SUSPENSION RESPIRATORY (INHALATION) at 21:00

## 2021-01-12 RX ADMIN — MIDODRINE HYDROCHLORIDE 5 MG: 5 TABLET ORAL at 08:30

## 2021-01-12 RX ADMIN — FUROSEMIDE 40 MG: 10 INJECTION, SOLUTION INTRAMUSCULAR; INTRAVENOUS at 09:00

## 2021-01-12 RX ADMIN — ENOXAPARIN SODIUM 40 MG: 40 INJECTION SUBCUTANEOUS at 09:00

## 2021-01-12 RX ADMIN — Medication 50 MG: at 09:00

## 2021-01-12 ASSESSMENT — PAIN SCALES - GENERAL: PAINLEVEL_OUTOF10: 0

## 2021-01-12 NOTE — PROGRESS NOTES
Physical Therapy  Facility/Department: [facility] ICU  Daily Treatment Note  NAME: Pita Hernandez  : 1933  MRN: [id number]    Date of Service: 2021    Discharge Recommendations:  Subacute/Skilled Nursing Facility        Assessment   Body structures, Functions, Activity limitations: Decreased functional mobility ; Decreased endurance;Decreased strength;Decreased balance  Assessment: Pt with deficits of bed mobility, transfers, ambulation, balance, safety awareness & postue brethign techniques. Pt requires 2 assist for safe bed mboility,  transfers & gait,  With current deficits, recommend SNF at D/C to return to baseline function and a safe D/C back to home environment. Pt requires continued PT to maximize independence with functional mobility, balance, safety awareness & activity tolerance. Prognosis: Good  Decision Making: Medium Complexity  Exam: functional mobility, activity tolerance, Balance, & [facility] AM-PAC 6 Clicks Basic Mobility  Clinical Presentation: evolving  REQUIRES PT FOLLOW UP: Yes  Activity Tolerance  Activity Tolerance: Patient limited by endurance  Activity Tolerance: saO2 ranging from 88-93% on hi flow NC     Patient Diagnosis(es): The primary encounter diagnosis was COVID-19. Diagnoses of Pulmonary fibrosis ([facility] [address] and Pneumonia due to organism were also pertinent to this visit. has a past medical history of Asthma, COPD (chronic obstructive pulmonary disease) ([facility] [address] and Pulmonary fibrosis ([facility] [address] has a past surgical history that includes pre-malignant / benign skin lesion excision (2017). Restrictions  Restrictions/Precautions  Restrictions/Precautions: General Precautions, Contact Precautions  Required Braces or Orthoses?: No  Position Activity Restriction  Other position/activity restrictions: droplet + for +covid-19, DNR-CCA, telemetry, up with assist, hi flow, continuous pulse ox & wired telemetry, LUE IV  Subjective   General  Chart Reviewed:  Yes Response To Previous Treatment: Patient with no complaints from previous session. Family / Caregiver Present: No  Subjective  Subjective: Pt agreeable to PT  General Comment  Comments: RN sourav PT  Pain Screening  Patient Currently in Pain: Denies  Pain Assessment  Response to Pain Intervention: Patient Satisfied  Vital Signs  BP Location: Right upper arm  Level of Consciousness: Alert (0)  Patient Currently in Pain: Denies  Oxygen Therapy  O2 Device: High flow nasal cannula  Patient Observation  Observations: Reassessed, continue to monitor. Orientation  Orientation  Overall Orientation Status: Within Normal Limits  Cognition      Objective   Bed mobility  Rolling to Left: Minimal assistance  Rolling to Right: Minimal assistance  Supine to Sit: Moderate assistance;2 Person assistance  Sit to Supine: Moderate assistance  Scooting: Moderate assistance  Comment: Min verbal instruction/tactile assist to reach UB hand placement onto rail and proper techniqueincluding pursed lip breathing with extended time needed to complete  Transfers  Sit to Stand: Moderate Assistance;2 Person Assistance  Stand to sit: Moderate Assistance;2 Person Assistance  Bed to Chair: Moderate assistance;2 Person Assistance  Stand Pivot Transfers: Moderate Assistance;2 Person Assistance  Comment: Ed on proper use of UB for safe sit/stand  Ambulation  Ambulation?: Yes  More Ambulation?: Yes  Ambulation 1  Surface: level tile  Device: Rolling Walker  Assistance:  Moderate assistance;2 Person assistance  Quality of Gait: step to pattern, extra assist for lines + cues for postue & safety  Ambulation 2  Surface - 2: level tile  Device 2: Rolling Walker  Assistance 2: Moderate assistance  Quality of Gait 2: step to pattern, extra assist for lines + cues for postue & safety  Gait Deviations: Slow Esme;Decreased step length;Decreased step height  Distance: 6ft     Balance  Posture: Fair  Sitting - Static: Good  Sitting - Dynamic: Good;- Exercises  Comments: seated LAQ's EOB, returned later & did seated LE ex's, sit to stands & postue ex's, DB ex's    All lines intact, call light within reach, and patient positioned comfortably at end of treatment. All patient needs addressed prior to ending therapy session. G-Code     OutComes Score                                                     AM-PAC Score  AM-PAC Inpatient Mobility Raw Score : 13 (01/07/21 1431)  AM-PAC Inpatient T-Scale Score : 36.74 (01/07/21 1431)  Mobility Inpatient CMS 0-100% Score: 64.91 (01/07/21 1431)  Mobility Inpatient CMS G-Code Modifier : CL (01/07/21 1431)          Goals  Short term goals  Time Frame for Short term goals: 12 treatments  Short term goal 1: Inc bed-mobility & transfers to independent to enable pt to safely get in/OOB  Short term goal 2: Inc gait to amb 150ft or > indep w/ RW to enable pt to return to previous level of independence  Short term goal 3: Inc strength to [address] standing balance to good with device to facilitate pt independence for performance of ADL's & functional mobility, & reduce fall risk; Short term goal 4: Pt able to tolerate 30-40 min of activity to include 15-20 reps of ex & functional mobility including 5 minutes of standing to facilitate activity tolerance to [facility]; Short term goal 5: Ed use of incentive spirometer & optimal breathing techniques, ex's with incorporation of breathing techniques, posture & positioning for optimal breathing &  mental training ex's & issue written pt education  Patient Goals   Patient goals : Back to Rehab then to [facility].     Plan    Plan  Times per week: 1-2x/day,6-7 days/week  Current Treatment Recommendations: Strengthening, Balance Training, Endurance Training, Gait Training, Functional Mobility Training, Transfer Training, Stair training  Safety Devices  Type of devices: Nurse notified, Call light within reach, Left in chair, Gait belt, All fall risk precautions in place  Restraints Initially in place: No     Therapy Time   Individual Concurrent Group Co-treatment   Time In 1100     0800   Time Out 36     0813   Minutes 30     13        Co-treatment with OT warranted secondary to decreased safety and independence requiring 2 skilled therapy professionals to address individual discipline's goals. PT addressing pre gait trunk strengthening, weight shifting prior to transfers, transfer training and postural control in sitting.       [address], PT

## 2021-01-12 NOTE — CARE COORDINATION
Kriss served Dr. Jia North, pulmonary, to see if the patient is medically cleared to transport to [facility]. Waiting on a return perfect serve. 1330- Dr. Jia North does not want the patient to leave until tomorrow. Joslyn Hancock at [facility] notified and transport cancelled. Writer set up transportation with [facility] for tomorrow at 1300.

## 2021-01-12 NOTE — PLAN OF CARE
Problem: Airway Clearance - Ineffective  Goal: Achieve or maintain patent airway  1/11/2021 2305 by Vibha James RN  Outcome: Ongoing     Problem: Gas Exchange - Impaired  Goal: Absence of hypoxia  1/11/2021 2305 by Vibha James RN  Outcome: Ongoing     Problem: Breathing Pattern - Ineffective  Goal: Ability to achieve and maintain a regular respiratory rate  1/11/2021 2305 by Vibha James RN  Outcome: Ongoing    Problem: Falls - Risk of:  Goal: Will remain free from falls  Description: Will remain free from falls  1/11/2021 2305 by Vibha James RN  Outcome: Ongoing

## 2021-01-12 NOTE — PROGRESS NOTES
Occupational Therapy  Facility/Department: [facility] ICU  Daily Treatment Note  NAME: Dulce Kapoor  : 1933  MRN: [id number]    Date of Service: 2021    Discharge Recommendations:  Subacute/Skilled Nursing Facility, [facility], [facility]  OT Equipment Recommendations  Equipment Needed: Yes  Mobility Devices: ADL Assistive Devices  ADL Assistive Devices: Sock-Aid Soft;Reacher; Toileting - 3-in-1 Commode;Long-handled Sponge;Emergency Alert System;Grab Bars - shower;Grab Bars - toilet;Long-handled Shoe Horn    Assessment   Performance deficits / Impairments: Decreased functional mobility ; Decreased ADL status; Decreased strength;Decreased safe awareness;Decreased cognition;Decreased endurance;Decreased balance;Decreased high-level IADLs;Decreased fine motor control;Decreased coordination;Decreased posture  Assessment: Pt would benefit from continued skilled OT services to Maximize I and safety during functional task to return home at [facility] with assist.  Prognosis: Fair  OT Education: OT Role;Plan of Care;Transfer Training;Energy Conservation; ADL Adaptive Strategies  REQUIRES OT FOLLOW UP: Yes  Activity Tolerance  Activity Tolerance: Patient Tolerated treatment well  Safety Devices  Safety Devices in place: Yes  Type of devices: Gait belt; All fall risk precautions in place;Nurse notified; Left in chair;Patient at risk for falls         Patient Diagnosis(es): The primary encounter diagnosis was COVID-19. Diagnoses of Pulmonary fibrosis ([address] and Pneumonia due to organism were also pertinent to this visit. has a past medical history of Asthma, COPD (chronic obstructive pulmonary disease) ([address] and Pulmonary fibrosis ([address] has a past surgical history that includes pre-malignant / benign skin lesion excision (2017).     Restrictions  Restrictions/Precautions  Restrictions/Precautions: General Precautions, Contact Precautions  Required Braces or Orthoses?: No  Position Activity Restriction Other position/activity restrictions: droplet + for +covid-19, DNR-CCA, telemetry, up with assist, hi flow, continuous pulse ox & wired telemetry, LUE IV     Subjective   General  Chart Reviewed: Yes  Patient assessed for rehabilitation services?: Yes  Response to previous treatment: Patient with no complaints from previous session  Family / Caregiver Present: No    Orientation  Orientation  Overall Orientation Status: Within Functional Limits     Objective    ADL  Grooming: Minimal assistance; Increased time to complete;Setup  UE Dressing: Maximum assistance;Setup  Additional Comments: Once patient up to chair, patient set up for shaving, patient shaved entire face, but required Min A to remove missed hairs on face and with upper lip d/t high flow. set up and SUP to wash face, MAX A to don/doff gown with line mgmt. Balance  Sitting Balance: Stand by assistance  Standing Balance: Moderate assistance(x2)    Functional Mobility  Functional - Mobility Device: Rolling Walker  Activity: Other  Assist Level: Moderate assistance(x2)  Functional Mobility Comments: Patient required mod verbal cues for sequencing/technique, RW safety, and total assist for line mgmt. Bed mobility  Supine to Sit: Moderate assistance;2 Person assistance  Scooting: Moderate assistance;2 Person assistance  Comment: Max verbal and physical assist to achieve log roll technique with use of bed rails, cues for sequencing/technique and encouragement to complete by himself vs using staff to pull on. Transfers  Sit to stand: Moderate assistance;2 Person assistance  Stand to sit: Moderate assistance;2 Person assistance  Transfer Comments: Mod A x2 with verbal and tactile cues for hand placements.  Patient requires cues for pacing d/t patient wanting to stand right after sitting EOB, education required for pursed lip breathing d/t O2 sats at 88% and recovered to 92% after 2-3 minutes      Cognition  Overall Cognitive Status: Exceptions Arousal/Alertness: Appropriate responses to stimuli  Following Commands: Follows multistep commands with repitition  Attention Span: Attends with cues to redirect; Difficulty dividing attention  Memory: Decreased short term memory;Decreased recall of recent events;Decreased recall of precautions  Safety Judgement: Decreased awareness of need for safety;Decreased awareness of need for assistance  Problem Solving: Decreased awareness of errors;Assistance required to correct errors made;Assistance required to implement solutions;Assistance required to identify errors made;Assistance required to generate solutions  Insights: Decreased awareness of deficits  Initiation: Requires cues for some  Sequencing: Requires cues for some   Plan   Plan  Times per week: 4-5x/wk 1x/day as hua  Current Treatment Recommendations: Strengthening, Balance Training, Functional Mobility Training, Endurance Training, Equipment Evaluation, Education, & procurement, Patient/Caregiver Education & Training, Safety Education & Training, Self-Care / ADL  AM-PAC Score     AM-PAC Inpatient Mobility without Stair Climbing Raw Score : 11 (01/08/21 1348)  AM-[facility] Inpatient without Stair Climbing T-Scale Score : 35.66 (01/08/21 1348)  Mobility Inpatient CMS 0-100% Score: 67.36 (01/08/21 1348)  Mobility Inpatient without Stair CMS G-Code Modifier : CL (01/08/21 1348)  AM-PAC Inpatient Daily Activity Raw Score: 13 (01/12/21 1012)  AM-PAC Inpatient ADL T-Scale Score : 32.03 (01/12/21 1012)  ADL Inpatient CMS 0-100% Score: 63.03 (01/12/21 1012)  ADL Inpatient CMS G-Code Modifier : CL (01/12/21 1012)    Goals  Short term goals  Time Frame for Short term goals: By discharge, pt to demo  Short term goal 1: bed mobility to Min A of 1 with use of bed rail as needed. Short term goal 2: UB ADLs to SBA/LB ADLs to Min A with use of AD/AE as needed. Short term goal 3: increased BUE strength by 1/2 grade/I with simple BUE HEP to assist with self care tasks with use of handouts as needed  Short term goal 4: ADL transfers and functional mobility to Min A of 1 with use of AD as needed. Short term goal 5: toileting to Min A with use of AD/grab bars as needed. Long term goals  Long term goal 1: Pt to demo increased standing hua > 5 min with Min A using AD as needed to reduce fall risk during self care tasks. Long term goal 2: Pt/caregivers to be I with fall prevention/ EC/WS, recommendations for AE/DME and pressure relief with use of handouts as needed. Patient Goals   Patient goals : To feel better and go home! Therapy Time   Individual Concurrent Group Co-treatment   Time In       6274   Time Out       0840   Minutes       41     Partial Co-treatment with PT warranted secondary to decreased safety and independence requiring 2 skilled therapy professionals to address individual discipline's goals. OT addressing preparation for ADL transfer, functional reaching, environmental safety/scanning, fall prevention, functional mobility for ADL transfers and functional UE strength. Upon writer exit, call light within reach, pt retired to chair. All lines intact and patient positioned comfortably. Chair alarm in place. All patient needs addressed prior to ending therapy session. Chart reviewed prior to treatment and patient is agreeable for therapy. RN reports patient is medically stable for therapy treatment this date.       CRISPIN Hunter/SIMONE

## 2021-01-12 NOTE — DISCHARGE INSTR - COC
Continuity of Care Form    Patient Name: Rickey Tee   :  1933  MRN:  [id number]    Admit date:  2020  Discharge date:  2021    Code Status Order: DNR-CCA   Advance Directives:   [address] [facility] Documentation       Date/Time Healthcare Directive Type of Healthcare Directive Copy in [address] Agent's Name Healthcare Agent's Phone Number    [phone]  Yes, patient has an advance directive for healthcare treatment  David Boland 157            Admitting Physician:  Anthony Aguirre MD  PCP: Leigh Issa MD    Discharging Nurse: [facility] Unit/Room#: 1110/1110-01  Discharging Unit Phone Number: [phone]    Emergency Contact:   Extended Emergency Contact Information  Primary Emergency Contact: [address] Phone: [phone]  Relation: Other  Secondary Emergency Contact: [address] [facility] Phone: [phone]  Relation: Child    Past Surgical History:  Past Surgical History:   Procedure Laterality Date    PRE-MALIGNANT / BENIGN SKIN LESION EXCISION  2017    exc bx lesion right side of nose and right neck       Immunization History:   Immunization History   Administered Date(s) Administered    Influenza 10/11/2012    Influenza Vaccine, unspecified formulation 10/20/2016, 2017    Influenza Virus Vaccine 10/17/2013, 10/06/2014    Influenza, Quadv, adjuvanted, 65 yrs +, IM, PF (Fluad) 2020    Pneumococcal Conjugate 13-valent (Cngecnb22) 2016, 2016    Pneumococcal Conjugate 7-valent (Prevnar7) 2012    Pneumococcal Polysaccharide (Ohuuydewl15) 05/15/2019    Tdap (Boostrix, Adacel) 2014    Zoster Live (Zostavax) 2012       Active Problems:  Patient Active Problem List   Diagnosis Code    COPD (chronic obstructive pulmonary disease) ([facility] J44.9    Dyslipidemia E78.5    Pulmonary interstitial fibrosis ([facility] J84.10  Neoplasm of uncertain behavior of skin D48.5    Pneumonia due to COVID-19 virus U07.1, J12.82    Gastroesophageal reflux disease without esophagitis K21.9    Former smoker Z87.891    Benign prostatic hyperplasia N40.0    Anxiety F41.9    Acute-on-chronic respiratory failure (HCC) J96.20    COVID-19 U07.1    SIRS (systemic inflammatory response syndrome) ([facility]) R65.10    Acute respiratory failure due to COVID-19 ([facility]) U07.1, J96.00    Sepsis due to COVID-19 ([facility]) U07.1, A41.89       Isolation/Infection:   Isolation            Droplet Plus          Patient Infection Status       Infection Onset Added Last Indicated Last Indicated By Review Planned Expiration Resolved Resolved By    COVID-19 12/27/20 12/30/20 12/30/20 COVID-19 01/16/21 01/13/21      S/S 12/27    Resolved    COVID-19 Rule Out 12/30/20 12/30/20 12/30/20 COVID-19 (Ordered)   12/30/20 Rule-Out Test Resulted    COVID-19 Rule Out 11/21/20 11/24/20 11/21/20 Covid-19 Ambulatory (Ordered)   11/24/20 Rule-Out Test Resulted            Nurse Assessment:  Last Vital Signs: BP (!) 92/58   Pulse 88   Temp 98.4 °F (36.9 °C) (Oral)   Resp 24   Ht 5' 6\" (1.676 m)   Wt 133 lb 12.8 oz (60.7 kg)   SpO2 95%   BMI 21.60 kg/m²     Last documented pain score (0-10 scale): Pain Level: 0  Last Weight:   Wt Readings from Last 1 Encounters:   01/09/21 133 lb 12.8 oz (60.7 kg)     Mental Status:  alert    IV Access:  - None    Nursing Mobility/ADLs:  Walking   Assisted  Transfer  Assisted  Bathing  Assisted  Dressing  Assisted  Toileting  Assisted  Feeding  Independent  Med Admin  Assisted  Med Delivery   whole    Wound Care Documentation and Therapy:        Elimination:  Continence:   · Bowel: No  · Bladder: No  Urinary Catheter:  Insertion Date: huynh in place on admittance    Colostomy/Ileostomy/Ileal Conduit: No       Date of Last BM: 01/13/2020    Intake/Output Summary (Last 24 hours) at 1/12/2021 1325  Last data filed at 1/12/2021 0533  Gross per 24 hour Intake 640 ml   Output 945 ml   Net -305 ml     I/O last 3 completed shifts: In: 18 [P.O.:860]  Out: [address] [facility] [address] [[id number]]    Safety Concerns: At Risk for Falls    Impairments/Disabilities:      Vision and Hearing    Nutrition Therapy:  Current Nutrition Therapy:   - Oral Diet:  General    Routes of Feeding: Oral  Liquids: No Restrictions  Daily Fluid Restriction: no  Last Modified Barium Swallow with Video (Video Swallowing Test): not done    Treatments at the Time of Hospital Discharge:   Respiratory Treatments: Pulmicort and duoneb  Oxygen Therapy:  is on oxygen at heated high flow at 40 % and 40 Liters L/min per nasal cannula. Ventilator:    - heated high flow    Rehab Therapies: Physical Therapy and Occupational Therapy  Weight Bearing Status/Restrictions: No weight bearing restirctions  Other Medical Equipment (for information only, NOT a DME order):  walker  Other Treatments: ***    Patient's personal belongings (please select all that are sent with patient):  None    RN SIGNATURE:  {Esignature:[id number]}    CASE MANAGEMENT/SOCIAL WORK SECTION    Inpatient Status Date: ***    Readmission Risk Assessment Score:  Readmission Risk              Risk of Unplanned Readmission:        23           Discharging to Facility/ Agency   Name:   [facility]            [address] [place] [address], [address] [place], [place] [id number]       Phone: [phone]       Fax: [phone]        ·     Dialysis Facility (if applicable)   · Name:  · Address:  · Dialysis Schedule:  · Phone:  · Fax:    / signature: Electronically signed by Jose Gustafson RN on 1/12/21 at 1:26 PM EST    PHYSICIAN SECTION    Prognosis: Fair    Condition at Discharge: Stable    Rehab Potential (if transferring to Rehab):  Fair    Recommended Labs or Other Treatments After Discharge: CBC,CMP,Mag in 1 day Physician Certification: I certify the above information and transfer of Mesfin Guerrero  is necessary for the continuing treatment of the diagnosis listed and that he requires LTAC for less 30 days.      Update Admission H&P: No change in H&P    PHYSICIAN SIGNATURE:  Electronically signed by Arlyn Abreu MD on 1/13/21 at 4:52 PM EST

## 2021-01-12 NOTE — PROGRESS NOTES
[facility]  Office: Scarlett Katerina, DO, Nora Miltone, DO, Natalyyohannesgilbert Maker, DO, Jordyn Caldera Blood, DO, Azeb Almendarez MD, Mehdi Alarcon MD, Luz Adler MD, Frieda Morton MD, Elijah Hutchinson MD, Chadd Madrigal MD, Figueroa Lobo MD, Farhat Cortes MD, nAuel Lawton MD, Shamir Blackman DO, Cristo Teixeira MD, Cassandra Simental MD, Olivia Jeong DO, Sherita Burrell MD,  Soren Smith DO, Shahriar Donaldson MD, Elisabeth Forde MD, Priyanka Montenegro CNP, [facility], CNP, Payal Benito, CNP, Urbano Parker, CNS, Sakshi Oliver, CNP, Emir Reid, CNP, Ange Matthews, CNP, August Faustin, CNP, Kena Villavicencio, CNP, PHILOMENA Chacko-C, Jessica Gonzalez, [facility], Alyssa Kim, CNP, Oliver Sands, CNP, Fela Swartz, CNP, Tra Smith, [facility], Venkata Lee, [facility]    Progress Note    1/12/2021    4:37 PM    Name:   Page Weston  MRN:     [id number]     Acct:      [de-identified]   Room:   [address] [place] [address] Day:  15  Admit Date:  12/30/2020 10:47 AM    PCP:   Kenny Kim MD  Code Status:  DNR-CCA    Subjective:     C/C:   Chief Complaint   Patient presents with    Shortness of Breath     pt states gradually gotten worse over the past few months     Interval History Status: improved. Patient was seen and evaluated at bedside this morning.     Patient continues to be on high flow nasal cannula at this time,  Needing 40 L, 40% FiO2  Improving slowly   Possibility of send to LTACH,   pulm to comment     Brief History: Patient is a resident at assisted living facility. Patient reports that the facility has recently had an outbreak of Covid. Patient reports over the past 3 days he has been experiencing exertional fatigue followed rapidly by a persistent cough with exertional dyspnea over the past 24 hours. Patient reports that the symptoms are present in between his ADLs and he has been having increasing difficulty with breathing throughout the day. Patient reports to the freestanding emergency department by EMS where he was found to be profoundly hypoxic and was started on supplemental oxygen by nonrebreather mask at 15 L. Initial lab testing was completed and finds patient is Covid positive. Patient is admitted to the hospital for acute respiratory failure with hypoxia and will be treated aggressively with standard Covid therapy. Review of Systems:     Constitutional:  negative for chills, fevers, sweats  Respiratory: Still coughing, shortness of breath  Cardiovascular:  negative for chest pain,   Gastrointestinal:  negative for abdominal pain, constipation, diarrhea, nausea, vomiting  Neurological:  negative for dizziness, headache    Medications: Allergies:     Allergies   Allergen Reactions    Bactrim     Other      Sweet potatoes     Prednisone Other (See Comments)     Trouble voiding       Current Meds:   Scheduled Meds:    latanoprost  1 drop Both Eyes Nightly    budesonide  0.5 mg Nebulization 4x daily    QUEtiapine  50 mg Oral BID    furosemide  40 mg Intravenous Daily    midodrine  5 mg Oral TID     sodium chloride flush  10 mL Intravenous 2 times per day    ipratropium-albuterol  1 ampule Inhalation Q4H WA    enoxaparin  40 mg Subcutaneous Daily    aspirin  81 mg Oral Daily    Pirfenidone  3 capsule Oral TID WC    famotidine  40 mg Oral Daily    fluticasone  2 spray Each Nostril Daily    tamsulosin  0.4 mg Oral Daily    Arformoterol Tartrate  15 mcg Nebulization BID  sodium chloride flush  10 mL Intravenous 2 times per day    Vitamin D  6,000 Units Oral Daily    zinc sulfate  50 mg Oral Daily     Continuous Infusions:    dexmedetomidine (PRECEDEX) IV infusion Stopped (21 0500)    sodium chloride       PRN Meds: polyethylene glycol, sodium chloride flush, ALPRAZolam, calcium carbonate, albuterol, sodium chloride flush, potassium chloride **OR** potassium alternative oral replacement **OR** potassium chloride, magnesium sulfate, promethazine **OR** ondansetron, nicotine, acetaminophen **OR** acetaminophen, sodium chloride, guaiFENesin-dextromethorphan, sodium chloride    Data:     Past Medical History:   has a past medical history of Asthma, COPD (chronic obstructive pulmonary disease) ([facility] and Pul[facility] fibrosis ([facility] Social History:   reports that he quit smoking about 53 years ago. He has never used smokeless tobacco. He reports that he does not drink alcohol or use drugs. Family History:   Family History   Problem Relation Age of Onset    Heart Attack Mother     Heart Attack Father        Vitals:  BP (!) 92/58   Pulse 88   Temp 98.4 °F (36.9 °C) (Oral)   Resp (!) 33   Ht 5' 6\" (1.676 m)   Wt 133 lb 12.8 oz (60.7 kg)   SpO2 98%   BMI 21.60 kg/m²   Temp (24hrs), Av.1 °F (36.7 °C), Min:98 °F (36.7 °C), Max:98.4 °F (36.9 °C)    No results for input(s): POCGLU in the last 72 hours. I/O (24Hr):     Intake/Output Summary (Last 24 hours) at 2021 1637  Last data filed at 2021 0533  Gross per 24 hour   Intake 420 ml   Output 945 ml   Net -525 ml       Labs:  Hematology:  Recent Labs     01/10/21  0321 21  0507 21  0428   WBC 8.8  --  12.9*   RBC 4.03*  --  3.91*   HGB 11.9*  --  11.4*   HCT 36.4*  --  36.0*   MCV 90.3  --  92.1   MCH 29.5  --  29.2   MCHC 32.7  --  31.7   RDW 14.6*  --  15.1*   *  --  95*   MPV 12.5  --  11.1   CRP  --  11.1*  --    DDIMER  --  0.42  --      Chemistry:  Recent Labs     01/10/21 0321 01/12/21  0428   * 131*   K 4.4 3.9   CL 85* 82*   CO2 39* 43*   GLUCOSE 141* 119*   BUN 25* 31*   CREATININE <0.40* <0.40*   ANIONGAP 7* 6*   LABGLOM CANNOT BE CALCULATED CANNOT BE CALCULATED   GFRAA CANNOT BE CALCULATED CANNOT BE CALCULATED   CALCIUM 9.2 8.9     Recent Labs     01/10/21  0321   PROT 6.5   LABALBU 3.4*   AST 24   ALT 32   ALKPHOS 65   BILITOT 0.29*     ABG:No results found for: POCPH, PHART, PH, POCPCO2, UXE4TSO, PCO2, POCPO2, PO2ART, PO2, POCHCO3, MEW2NEH, HCO3, NBEA, PBEA, BEART, BE, THGBART, THB, ENX3MOU, CMIP3SBW, J4JMTTMF, O2SAT, FIO2  Lab Results   Component Value Date/Time    [facility] 12/30/2020 12:45 PM     Lab Results   Component Value Date/Time    CULTURE NO GROWTH 6 DAYS 12/30/2020 12:45 PM       Radiology:  Xr Chest Portable    Result Date: 1/1/2021  No significant interval change. Diffuse bilateral patchy airspace opacity and interstitial thickening. Xr Chest Portable    Result Date: 12/30/2020  Patchy diffuse bilateral pulmonary opacification most consistent with multifocal pneumonia including atypical viral pneumonia.        Physical Examination:        General appearance:  alert, cooperative and no distress  Mental Status:  oriented to person, place and time and normal affect  Lungs: Bilateral coarse breath sounds  Heart:  regular rate and rhythm, no murmur  Abdomen:  soft, NT  Extremities:  no edema, redness,  Skin:  no gross lesions, rashes, induration    Assessment:        Hospital Problems           Last Modified POA    * (Principal) Acute respiratory failure due to COVID-19 ([facility] 1/6/2021 Yes    COPD (chronic obstructive pulmonary disease) ([address] 12/30/2020 Yes    Dyslipidemia 12/30/2020 Yes    Pulmonary interstitial fibrosis ([facility] 12/30/2020 Yes    Pneumonia due to COVID-19 virus 1/6/2021 Yes    Gastroesophageal reflux disease without esophagitis 12/30/2020 Yes    Former smoker 12/30/2020 Yes    Benign prostatic hyperplasia 12/30/2020 Yes Anxiety 12/30/2020 Yes    Acute-on-chronic respiratory failure ([facility] 12/30/2020 Yes    COVID-19 12/30/2020 Yes    SIRS (systemic inflammatory response syndrome) ([facility] 12/30/2020 Yes    Sepsis due to COVID-19 ([facility] 1/6/2021 Yes          Plan:        1. Acute respiratory failure, sepsis due to Covid, status post remdesivir, dexamethasone, S/p convalescent plasma  ,  2. Heated high flow O2 as well as adult BiPAP protocol at respiratory discretion  3. Contraction alkalosis, lasix placed on hold   4. Midodrine added due to hypotension  5. Maintain indwelling urinary catheter for BPH with urinary retention as well as Flomax  6. PPI for GERD, Lipitor for dyslipidemia  7. Anxiety management with home medication as well as Xanax  8. Pulmonology on board   9.  Lovenox 30 twice daily for anticoagulation with Covid    Possibility to DC to [facility] if accepted n okayed with Paty Agosto MD  1/12/2021  4:37 PM

## 2021-01-12 NOTE — FLOWSHEET NOTE
Pt sleeping earlier today, writer then attempted to call later in the day with no answer. Spiritual Care will follow up at later time. Chaplains will remain available to offer spiritual and emotional support as needed.        01/12/21 1517   Encounter Summary   Services provided to: Patient not available   Referral/Consult From: Diane   Continue Visiting   (1/12/21 sleeping)   Complexity of Encounter Low   Length of Encounter 15 minutes   Routine   Type Follow up   Assessment Sleeping   Intervention Prayer     Electronically signed by Willie Edge, on 1/12/2021 at 3:20 PM.  [address] [facility]  [phone]

## 2021-01-12 NOTE — PLAN OF CARE
Problem: Breathing Pattern - Ineffective  Goal: Ability to achieve and maintain a regular respiratory rate  Intervention: Stress management techniques  Note: Respiratory assessment performed and charted. Lungs assessed. Monitored respiratory rate, rhythm and pattern. Patient remains on HFNC during the day and wears BIPAP at night. Patient continues with crackles noted in posterior lobes and R. Anterior. Monitored for any s/sx of respiratory distress. Monitored activity tolerance. Encouraged C&DBing and use of IS. Instructed to call with any c/o SOB. Continuous pulse oximetry. Problem: Skin Integrity:  Goal: Will show no infection signs and symptoms  Description: Will show no infection signs and symptoms  Outcome: Ongoing  Note: Skin assessment  performed and charted. Assessed for areas of redness and or breakdown. Patient came in with pre-existing wound to the coccyx. Patient instructed on the importance of position changes for prevention of further skin breakdown and healing. Assessed oral mucosa. Monitored for areas of redness, open sores, thrush and dentition. Monitored nutritional intake. Instructed on the importance of adequate nutritional intake for wound healing. Problem: Falls - Risk of:  Goal: Will remain free from falls  Description: Will remain free from falls  1/12/2021 1143 by Laisha Sampson RN  Outcome: Ongoing  Note: Continue fall precautions including arm band identification, falling star placed outside of the room and alarm at night and when unattended. Use of ambulatory aids when indicated and frequent visual checks. Monitored orientation status. Call light within reach at all times. Bed remains in lowest locked position. Instructed to call with any needs for assistance. All clutter removed from room. All personal belongings within reach. Frequent nursing rounds continued.    1/11/2021 2305 by Sameera Vyas RN  Outcome: Ongoing

## 2021-01-13 ENCOUNTER — HOSPITAL ENCOUNTER (OUTPATIENT)
Dept: MEDSURG UNIT | Age: 86
Discharge: NURSING, REHAB OR LONG-TERM CARE | DRG: 871 | End: 2021-01-13
Attending: FAMILY MEDICINE | Admitting: INTERNAL MEDICINE
Payer: MEDICARE

## 2021-01-13 VITALS
DIASTOLIC BLOOD PRESSURE: 74 MMHG | HEIGHT: 66 IN | RESPIRATION RATE: 18 BRPM | TEMPERATURE: 98.8 F | BODY MASS INDEX: 21.5 KG/M2 | HEART RATE: 77 BPM | OXYGEN SATURATION: 94 % | SYSTOLIC BLOOD PRESSURE: 92 MMHG | WEIGHT: 133.8 LBS

## 2021-01-13 LAB
C-REACTIVE PROTEIN: 87.1 MG/L (ref 0–5)
D-DIMER QUANTITATIVE: 0.41 MG/L FEU (ref 0–0.59)

## 2021-01-13 PROCEDURE — 2700000000 HC OXYGEN THERAPY PER DAY

## 2021-01-13 PROCEDURE — 6370000000 HC RX 637 (ALT 250 FOR IP): Performed by: INTERNAL MEDICINE

## 2021-01-13 PROCEDURE — 6360000002 HC RX W HCPCS: Performed by: INTERNAL MEDICINE

## 2021-01-13 PROCEDURE — 94761 N-INVAS EAR/PLS OXIMETRY MLT: CPT

## 2021-01-13 PROCEDURE — 2580000003 HC RX 258: Performed by: INTERNAL MEDICINE

## 2021-01-13 PROCEDURE — 6370000000 HC RX 637 (ALT 250 FOR IP): Performed by: NURSE PRACTITIONER

## 2021-01-13 PROCEDURE — 99239 HOSP IP/OBS DSCHRG MGMT >30: CPT | Performed by: INTERNAL MEDICINE

## 2021-01-13 PROCEDURE — 6360000002 HC RX W HCPCS: Performed by: NURSE PRACTITIONER

## 2021-01-13 PROCEDURE — 94640 AIRWAY INHALATION TREATMENT: CPT

## 2021-01-13 PROCEDURE — 86140 C-REACTIVE PROTEIN: CPT

## 2021-01-13 PROCEDURE — 97535 SELF CARE MNGMENT TRAINING: CPT

## 2021-01-13 PROCEDURE — 36415 COLL VENOUS BLD VENIPUNCTURE: CPT

## 2021-01-13 PROCEDURE — 85379 FIBRIN DEGRADATION QUANT: CPT

## 2021-01-13 PROCEDURE — 2580000003 HC RX 258: Performed by: NURSE PRACTITIONER

## 2021-01-13 RX ORDER — MIDODRINE HYDROCHLORIDE 5 MG/1
5 TABLET ORAL
Qty: 90 TABLET | Refills: 3 | Status: SHIPPED | OUTPATIENT
Start: 2021-01-13

## 2021-01-13 RX ORDER — ARFORMOTEROL TARTRATE 15 UG/2ML
15 SOLUTION RESPIRATORY (INHALATION) 2 TIMES DAILY
Qty: 120 ML | Refills: 3 | Status: SHIPPED | OUTPATIENT
Start: 2021-01-13

## 2021-01-13 RX ORDER — CHOLECALCIFEROL (VITAMIN D3) 50 MCG
6000 TABLET ORAL DAILY
Qty: 60 TABLET | Refills: 0 | Status: SHIPPED | OUTPATIENT
Start: 2021-01-14

## 2021-01-13 RX ORDER — PROMETHAZINE HYDROCHLORIDE 12.5 MG/1
12.5 TABLET ORAL EVERY 6 HOURS PRN
Qty: 30 TABLET | Refills: 0 | Status: SHIPPED | OUTPATIENT
Start: 2021-01-13 | End: 2021-01-20

## 2021-01-13 RX ORDER — CALCIUM CARBONATE 200(500)MG
500 TABLET,CHEWABLE ORAL 3 TIMES DAILY PRN
Qty: 90 TABLET | Refills: 0 | Status: SHIPPED | OUTPATIENT
Start: 2021-01-13 | End: 2021-02-12

## 2021-01-13 RX ORDER — GUAIFENESIN/DEXTROMETHORPHAN 100-10MG/5
5 SYRUP ORAL EVERY 4 HOURS PRN
Qty: 120 ML | Refills: 0 | Status: SHIPPED | OUTPATIENT
Start: 2021-01-13 | End: 2021-01-23

## 2021-01-13 RX ORDER — ALPRAZOLAM 0.25 MG/1
0.25 TABLET ORAL 4 TIMES DAILY PRN
Qty: 20 TABLET | Refills: 0 | Status: SHIPPED | OUTPATIENT
Start: 2021-01-13 | End: 2021-02-12

## 2021-01-13 RX ORDER — ALBUTEROL SULFATE 2.5 MG/3ML
2.5 SOLUTION RESPIRATORY (INHALATION) EVERY 6 HOURS PRN
Qty: 120 EACH | Refills: 3 | Status: SHIPPED | OUTPATIENT
Start: 2021-01-13

## 2021-01-13 RX ORDER — IPRATROPIUM BROMIDE AND ALBUTEROL SULFATE 2.5; .5 MG/3ML; MG/3ML
3 SOLUTION RESPIRATORY (INHALATION)
Qty: 360 ML | Refills: 0 | Status: SHIPPED | OUTPATIENT
Start: 2021-01-13

## 2021-01-13 RX ORDER — QUETIAPINE FUMARATE 50 MG/1
50 TABLET, FILM COATED ORAL 2 TIMES DAILY
Qty: 60 TABLET | Refills: 3 | Status: SHIPPED | OUTPATIENT
Start: 2021-01-13

## 2021-01-13 RX ORDER — ASPIRIN 81 MG/1
81 TABLET ORAL DAILY
Qty: 30 TABLET | Refills: 3 | Status: SHIPPED | OUTPATIENT
Start: 2021-01-14

## 2021-01-13 RX ADMIN — SODIUM CHLORIDE, PRESERVATIVE FREE 10 ML: 5 INJECTION INTRAVENOUS at 08:03

## 2021-01-13 RX ADMIN — TAMSULOSIN HYDROCHLORIDE 0.4 MG: 0.4 CAPSULE ORAL at 20:03

## 2021-01-13 RX ADMIN — ARFORMOTEROL TARTRATE 15 MCG: 15 SOLUTION RESPIRATORY (INHALATION) at 18:17

## 2021-01-13 RX ADMIN — BUDESONIDE 500 MCG: 0.5 SUSPENSION RESPIRATORY (INHALATION) at 06:00

## 2021-01-13 RX ADMIN — QUETIAPINE FUMARATE 50 MG: 25 TABLET ORAL at 20:03

## 2021-01-13 RX ADMIN — IPRATROPIUM BROMIDE AND ALBUTEROL SULFATE 1 AMPULE: .5; 3 SOLUTION RESPIRATORY (INHALATION) at 14:49

## 2021-01-13 RX ADMIN — IPRATROPIUM BROMIDE AND ALBUTEROL SULFATE 1 AMPULE: .5; 3 SOLUTION RESPIRATORY (INHALATION) at 11:24

## 2021-01-13 RX ADMIN — SODIUM CHLORIDE, PRESERVATIVE FREE 10 ML: 5 INJECTION INTRAVENOUS at 20:03

## 2021-01-13 RX ADMIN — LATANOPROST 1 DROP: 50 SOLUTION OPHTHALMIC at 20:07

## 2021-01-13 RX ADMIN — MIDODRINE HYDROCHLORIDE 5 MG: 5 TABLET ORAL at 08:02

## 2021-01-13 RX ADMIN — QUETIAPINE FUMARATE 50 MG: 25 TABLET ORAL at 08:02

## 2021-01-13 RX ADMIN — FAMOTIDINE 40 MG: 20 TABLET, FILM COATED ORAL at 20:03

## 2021-01-13 RX ADMIN — IPRATROPIUM BROMIDE AND ALBUTEROL SULFATE 1 AMPULE: .5; 3 SOLUTION RESPIRATORY (INHALATION) at 06:00

## 2021-01-13 RX ADMIN — BUDESONIDE 500 MCG: 0.5 SUSPENSION RESPIRATORY (INHALATION) at 11:24

## 2021-01-13 RX ADMIN — FLUTICASONE PROPIONATE 2 SPRAY: 50 SPRAY, METERED NASAL at 08:03

## 2021-01-13 RX ADMIN — BUDESONIDE 500 MCG: 0.5 SUSPENSION RESPIRATORY (INHALATION) at 18:17

## 2021-01-13 RX ADMIN — ENOXAPARIN SODIUM 40 MG: 40 INJECTION SUBCUTANEOUS at 08:03

## 2021-01-13 RX ADMIN — MIDODRINE HYDROCHLORIDE 5 MG: 5 TABLET ORAL at 12:00

## 2021-01-13 RX ADMIN — Medication 6000 UNITS: at 08:02

## 2021-01-13 RX ADMIN — IPRATROPIUM BROMIDE AND ALBUTEROL SULFATE 1 AMPULE: .5; 3 SOLUTION RESPIRATORY (INHALATION) at 18:17

## 2021-01-13 RX ADMIN — BUDESONIDE 500 MCG: 0.5 SUSPENSION RESPIRATORY (INHALATION) at 14:49

## 2021-01-13 RX ADMIN — MIDODRINE HYDROCHLORIDE 5 MG: 5 TABLET ORAL at 18:00

## 2021-01-13 RX ADMIN — ARFORMOTEROL TARTRATE 15 MCG: 15 SOLUTION RESPIRATORY (INHALATION) at 06:00

## 2021-01-13 RX ADMIN — ASPIRIN 81 MG: 81 TABLET, COATED ORAL at 08:02

## 2021-01-13 ASSESSMENT — PAIN SCALES - GENERAL: PAINLEVEL_OUTOF10: 0

## 2021-01-13 NOTE — PROGRESS NOTES
Restrictions/Precautions  Restrictions/Precautions: General Precautions, Contact Precautions  Required Braces or Orthoses?: No  Position Activity Restriction  Other position/activity restrictions: droplet + for +covid-19, DNR-CCA, telemetry, up with assist, hi flow, continuous pulse ox & telemetry, LUE IV  Subjective   General  Chart Reviewed: Yes  Patient assessed for rehabilitation services?: Yes  Response to previous treatment: Patient with no complaints from previous session  Family / Caregiver Present: No      Orientation  Orientation  Overall Orientation Status: Within Functional Limits  Objective        Communication Interventions  Communication: Yes  Phone Management: Before starting to move during this therapy session, patient requested to call hime \"home team\" before starting. Patient utilized his own smart phone to text with 1 finger and required extended time and effort, required assist to [employer] text message. Balance  Sitting Balance: Stand by assistance(Once patient sitting EOB SpO2 85-88%, required ~5 minutes to recover to 90%; patient is limited with increased anxiety and unable to focus on pursed lip breathing unless constantly cueing pt to focus on breathing, then pt able to recover effficiently)  Standing Balance: Moderate assistance    Functional Mobility  Functional - Mobility Device: Rolling Walker  Activity: Other  Assist Level: Moderate assistance  Functional Mobility Comments: Patient required mod verbal cues for sequencing/technique, RW safety, and total assist for line mgmt. Bed mobility  Supine to Sit: Moderate assistance  Scooting: Contact guard assistance  Comment: Patient required Mod A to bring UB into upright positioning, CGA for scooting toward EOB in prep for functional transfer. Patient required step by step cues to complete and reduce anxiety. Transfers  Sit to stand:  Moderate assistance  Stand to sit: Moderate assistance Transfer Comments: Verbal cues for hand placements, sequencing/technique, controlled sit<>stand, and pursed lip breathing      Cognition  Overall Cognitive Status: Exceptions  Arousal/Alertness: Appropriate responses to stimuli  Following Commands: Follows multistep commands with repitition  Attention Span: Attends with cues to redirect; Difficulty dividing attention  Memory: Decreased short term memory;Decreased recall of recent events;Decreased recall of precautions  Safety Judgement: Decreased awareness of need for safety;Decreased awareness of need for assistance  Problem Solving: Decreased awareness of errors;Assistance required to correct errors made;Assistance required to implement solutions;Assistance required to identify errors made;Assistance required to generate solutions  Insights: Decreased awareness of deficits  Initiation: Requires cues for some  Sequencing: Requires cues for some      Plan   Plan  Times per week: 4-5x/wk 1x/day as hua  Times per day: Daily  Current Treatment Recommendations: Strengthening, Balance Training, Functional Mobility Training, Endurance Training, Equipment Evaluation, Education, & procurement, Patient/Caregiver Education & Training, Safety Education & Training, Self-Care / ADL  AM-PAC Score     AM-PAC Inpatient Mobility without Stair Climbing Raw Score : 11 (01/08/21 1348)  AM-[facility] Inpatient without Stair Climbing T-Scale Score : 35.66 (01/08/21 1348)  Mobility Inpatient CMS 0-100% Score: 67.36 (01/08/21 1348)  Mobility Inpatient without Stair CMS G-Code Modifier : CL (01/08/21 1348)  AM-PAC Inpatient Daily Activity Raw Score: 14 (01/13/21 0822)  AM-PAC Inpatient ADL T-Scale Score : 33.39 (01/13/21 7151)  ADL Inpatient CMS 0-100% Score: 59.67 (01/13/21 8948)  ADL Inpatient CMS G-Code Modifier : CK (01/13/21 0328)    Goals  Short term goals  Time Frame for Short term goals: By discharge, pt to demo  Short term goal 1: bed mobility to Min A of 1 with use of bed rail as needed. Short term goal 2: UB ADLs to SBA/LB ADLs to Min A with use of AD/AE as needed. Short term goal 3: increased BUE strength by 1/2 grade/I with simple BUE HEP to assist with self care tasks with use of handouts as needed  Short term goal 4: ADL transfers and functional mobility to Min A of 1 with use of AD as needed. Short term goal 5: toileting to Min A with use of AD/grab bars as needed. Long term goals  Long term goal 1: Pt to demo increased standing hua > 5 min with Min A using AD as needed to reduce fall risk during self care tasks. Long term goal 2: Pt/caregivers to be I with fall prevention/ EC/WS, recommendations for AE/DME and pressure relief with use of handouts as needed. Patient Goals   Patient goals : To feel better and go home! Therapy Time   Individual Concurrent Group Co-treatment   Time In 0003         Time Out 0901         Minutes 44           Upon writer exit, call light within reach, pt retired to chair. All lines intact and patient positioned comfortably. Chair alarm in place. All patient needs addressed prior to ending therapy session. Chart reviewed prior to treatment and patient is agreeable for therapy. RN reports patient is medically stable for therapy treatment this date.       CRISPIN Doss/ISMONE

## 2021-01-13 NOTE — PLAN OF CARE
Problem: Airway Clearance - Ineffective  Goal: Achieve or maintain patent airway  1/12/2021 2217 by Robert Mcclellan  Outcome: Ongoing  1/12/2021 1143 by Loida House RN  Outcome: Ongoing   Monitor Resp.  Status

## 2021-01-13 NOTE — PROGRESS NOTES
Pulmonary Critical Care Progress Note  Kanwal Villa MD     Patient seen for the follow up of  Acute respiratory failure due to COVID-19 [facility])     Subjective:  No significant overnight events. He is lying comfortably in the bed. He is still on high flow. His shortness of breath is about the same. He has occasional productive cough. He denies any chest pain. His oral intake is fair. He is looking better than yesterday. Examination:  Vitals: BP (!) 93/57   Pulse 87   Temp 98 °F (36.7 °C) (Oral)   Resp 27   Ht 5' 6\" (1.676 m)   Wt 133 lb 12.8 oz (60.7 kg)   SpO2 94%   BMI 21.60 kg/m²   General appearance:  alert and cooperative with exam,  in recliner  Neck: No JVD  Lungs: Moderate air exchange, no wheezing, rales or rhonchi at this time  Heart: regular rate and rhythm, S1, S2 normal, no gallop  Abdomen: Soft, non tender, + BS  Extremities: no cyanosis or clubbing.  No significant edema    LABs:  CBC:   Recent Labs     01/12/21 0428   WBC 12.9*   HGB 11.4*   HCT 36.0*   PLT 95*     BMP:   Recent Labs     01/12/21 0428   *   K 3.9   CO2 43*   BUN 31*   CREATININE <0.40*   LABGLOM CANNOT BE CALCULATED   GLUCOSE 119*     Radiology:  1/10/2021      Impression:  · Acute hypoxic respiratory failure  · COVID-19 pneumonia  · Mild pulmonary edema/acute on chronic systolic heart failure  · History of pulmonary fibrosis  · Former smoker, possible COPD  · Moderate pulmonary hypertension, RVSP 50 mmHg  · Mild thrombocytopenia, resolved    Recommendations:  · Oxygen via high flow nasal cannula, wean as able, keep SPO2 greater than 92%  · BiPAP support as needed during the day and while asleep  · Seroquel 50 mg twice daily  · Incentive spirometry every hour while awake  · Prone as tolerated  · Status post Decadron and remdesivir   · albuterol and Ipratropium Q 4 hours and prn  · Budesonide and Brovana via nebulizer  · Midodrine 5 mg 3 times daily  · Diuresis with IV Lasix 40 mg daily  · Continue Esbriet · DVT prophylaxis with low molecular weight heparin, monitor platelets  · PUD prophylaxis with Pepcid  · DNR CCA  · Discussed with RN and RT  · OK for transfer planning from pulmonary stand point to LTAC. Plan was discussed with patient and he understands it.     Electronically signed by     Kandace Lehman MD on 1/12/2021, 11:15 am  Pulmonary Critical Care and Sleep Medicine,  [facility]  Cell: [phone]  Office: [phone]

## 2021-01-13 NOTE — CARE COORDINATION
Changed transportation for 1500, since Sr. Luna cannot see patient until approximately 1300. Will address discharge once Dr. Analia Andrew assesses the patient.

## 2021-01-13 NOTE — PROGRESS NOTES
Physical Therapy  DATE: 2021    NAME: Ivanna Abarca  MRN: [id number]   : 1933    Patient not seen this date for Physical Therapy due to:  [] Blood transfusion in progress  [] Cancel by RN  [] Hemodialysis  [x]  Pt just BTB after up x 3 hrs  [] Spine Precautions   [] Strict Bedrest  [] Surgery  [] Testing      [] Other        [] PT being discontinued at this time. Patient independent. No further needs. [] PT being discontinued at this time as the patient has been transferred to hospice care. No further needs.     [address], PT

## 2021-01-13 NOTE — CARE COORDINATION
Spoke with GAY Johnson. Dr. Black Guillen states that the patient could be discharged. [facility]  [facility] to transport.

## 2021-01-13 NOTE — PLAN OF CARE
Problem: Gas Exchange - Impaired  Goal: Absence of hypoxia  Outcome: Ongoing     Problem: Breathing Pattern - Ineffective  Goal: Ability to achieve and maintain a regular respiratory rate  Outcome: Ongoing     Problem: Body Temperature -  Risk of, Imbalanced  Goal: Will regain or maintain usual level of consciousness  Outcome: Ongoing     Problem: Body Temperature -  Risk of, Imbalanced  Goal: Ability to maintain a body temperature within defined limits  Outcome: Ongoing     Problem: Isolation Precautions - Risk of Spread of Infection  Goal: Prevent transmission of infection  Outcome: Ongoing     Problem: Fatigue  Goal: Verbalize increase energy and improved vitality  Outcome: Ongoing  Note: Patient up to chair for breakfast and remained in chair until mid afternoon. Patient did become fatigued but tolerated well. Will continue to monitor. Problem: Falls - Risk of:  Goal: Will remain free from falls  Description: Will remain free from falls  Outcome: Ongoing  Note: Siderails up x 2  Hourly rounding. Call light in reach. Instructed to call for assist before attempting out of bed. Remains free from falls and accidental injury at this time. Floor free from obstacles, and bed is locked and in lowest position. Adequate lighting provided. Bed alarm on. Fall sticker on wristband.  Fall Sign posted in doorway

## 2021-01-13 NOTE — PROGRESS NOTES
Kriss served Dr. Christa Burt per request to see if patient could be transferred to PCU and if ok with discharge today with 1300 . Message read, no response at this time.

## 2021-01-13 NOTE — DISCHARGE INSTR - DIET
? Good nutrition is important when healing from an illness, injury, or surgery. Follow any nutrition recommendations given to you during your hospital stay. ? If you were given an oral nutrition supplement while in the hospital, continue to take this supplement at home. You can take it with meals, in-between meals, and/or before bedtime. These supplements can be purchased at most local grocery stores, pharmacies, and chain [employer]-stores. ? If you have any questions about your diet or nutrition, call the hospital and ask for the dietitian. ?  General diet with standard high calorie oral supplement

## 2021-01-13 NOTE — PROGRESS NOTES
Pulmonary Critical Care Progress Note  Florentin Gutierrez MD     Patient seen for the follow up of  Acute respiratory failure due to COVID-19 [facility])     Subjective:  He did sit up in the chair but now he is lying down in the recliner. He is still on high flow. His shortness of breath is about the same. He has occasional productive cough. He denies any chest pain. His oral intake is fair. He is looking better than yesterday. Examination:  Vitals: BP (!) 93/57   Pulse 87   Temp 98 °F (36.7 °C) (Oral)   Resp 26   Ht 5' 6\" (1.676 m)   Wt 133 lb 12.8 oz (60.7 kg)   SpO2 95%   BMI 21.60 kg/m²   General appearance:  alert and cooperative with exam,  in recliner  Neck: No JVD  Lungs: Moderate air exchange, no wheezing, rales or rhonchi at this time  Heart: regular rate and rhythm, S1, S2 normal, no gallop  Abdomen: Soft, non tender, + BS  Extremities: no cyanosis or clubbing.  No significant edema    LABs:  CBC:   Recent Labs     01/12/21  0428   WBC 12.9*   HGB 11.4*   HCT 36.0*   PLT 95*     BMP:   Recent Labs     01/12/21  0428   *   K 3.9   CO2 43*   BUN 31*   CREATININE <0.40*   LABGLOM CANNOT BE CALCULATED   GLUCOSE 119*     Radiology:  1/10/2021      Impression:  · Acute hypoxic respiratory failure  · COVID-19 pneumonia  · Mild pulmonary edema/acute on chronic systolic heart failure  · History of pulmonary fibrosis  · Former smoker, possible COPD  · Moderate pulmonary hypertension, RVSP 50 mmHg  · Mild thrombocytopenia, resolved    Recommendations:  · Oxygen via high flow nasal cannula, wean as able, keep SPO2 greater than 92%  · BiPAP support as needed during the day and while asleep  · Seroquel 50 mg twice daily  · Incentive spirometry every hour while awake  · Prone as tolerated  · Status post Decadron and remdesivir   · albuterol and Ipratropium Q 4 hours and prn  · Budesonide and Brovana via nebulizer  · Midodrine 5 mg 3 times daily  · Diuresis with IV Lasix 40 mg daily  · Continue Esbriet · DVT prophylaxis with low molecular weight heparin, monitor platelets  · PUD prophylaxis with Pepcid  · DNR CCA  · Will reevaluate for LTAC transfer on Wednesday  · Discussed with RN  · Will follow with you    Electronically signed by     Rusty Ling MD on 1/12/2021, 11:15 am  Pulmonary Critical Care and Sleep Medicine,  [facility]  Cell: [phone]  Office: [phone]  Late entry of the note

## 2021-01-13 NOTE — DISCHARGE SUMMARY
[facility]  Office: [address] [facility], DO, Manju Valadez DO, Harpal Pabon DO, Willy Rodríguez, DO, Navya Hinton MD, Meg Pathak MD, Castillo Teague MD, Itlao Sifuentes MD, Ana Johnson MD, Lisandro Daniel MD, Jodee Adan MD, Araseli Rader MD, Anuel Henderson MD, Rafia Miller, DO, Brice Cazares MD, Alyssa Cummings MD, Tori Lin DO, Paula Martinez MD,  Krunal Jim DO, Nish Motley MD, Dorothy Bustos MD, Kassie Tong, [facility], [facility], CNP, [facility] Katerine, CNP, Fior River, CNS, Duane Bottcher, CNP, Donaldo Jett, CNP, Ahsan Blake, CNP, Phill Collazo, CNP, Joan Hanson, CNP, Miguel Angel River PA-C, Dearl Mera, [facility], Irma Spence, CNP, Michael Carrera, CNP, [facility], CNP, Marina Roman, [facility], [facility], [address] [place] [address]    Discharge Summary     Patient ID: Maria Luisa Teran  :  1933   MRN: [id number]     ACCOUNT:  [de-identified]   Patient's PCP: Arnold Savage MD  Admit Date: 2020   Discharge Date: 2021     Length of Stay: 14  Code Status:  DNR-CCA  Admitting Physician: Dorothy Bustos MD  Discharge Physician: Alyssa Cummings MD     Active Discharge Diagnoses:     Hospital Problem Lists:  Principal Problem:    [facility]  Active Problems:    COPD (chronic obstructive pulmonary disease) ([facility]    Dyslipidemia    Pulmonary interstitial fibrosis ([facility]    Pneumonia due to COVID-19 virus    Gastroesophageal reflux disease without esophagitis    Former smoker    Benign prostatic hyperplasia    Anxiety    Acute-on-chronic respiratory failure ([facility]    COVID-19    S[facility]ic [facility] response syndrome[facility]    [facility])  Resolved Problems:    * No resolved hospital problems.  *      Admission Condition:  critical     Discharged Condition: stable    Hospital Stay: Hospital Course:  Elpidio Hu is a 80 y.o. male who was admitted for the management of   Acute respiratory failure due to COVID-19 [facility]) , presented to ER with Shortness of Breath (pt states gradually gotten worse over the past few months)    Patient is a resident at assisted living facility. Milagros Merchant reports that the facility has recently had an outbreak of Marzette Juan Ramon reports over the past 3 days he has been experiencing exertional fatigue followed rapidly by a persistent cough with exertional dyspnea over the past 24 hours.  Patient reports that the symptoms are present in between his ADLs and he has been having increasing difficulty with breathing throughout the day.  Patient reports to the freestanding emergency department by EMS where he was found to be profoundly hypoxic and was started on supplemental oxygen by nonrebreather mask at 15 L.  Initial lab testing was completed and finds patient is Covid positive. Milagros Merchant is admitted to the hospital for acute respiratory failure with hypoxia and will be treated aggressively with standard Covid therapy. At this time patient is on high flow nasal cannula needing FiO2 of 40 to 50%.   Satting above 90%      Constitutional:  negative for chills, fevers, sweats  Respiratory: Still coughing, shortness of breath  Cardiovascular:  negative for chest pain,   Gastrointestinal:  negative for abdominal pain, constipation, diarrhea, nausea, vomiting  Neurological:  negative for dizziness, headache      Physical examination,  General appearance:  alert, cooperative and no distress  Mental Status:  oriented to person, place and time and normal affect  Lungs: Bilateral coarse breath sounds  Heart:  regular rate and rhythm, no murmur  Abdomen:  soft, NT  Extremities:  no edema, redness,  Skin:  no gross lesions, rashes, induration    Significant therapeutic interventions: 1. Acute respiratory failure, sepsis due to Covid, status post remdesivir, dexamethasone, S/p convalescent plasma  ,  2. Heated high flow O2 as well as adult BiPAP protocol at respiratory discretion  3. Midodrine added due to hypotension  4. Maintain indwelling urinary catheter for BPH with urinary retention as well as Flomax  5. PPI for GERD, Lipitor for dyslipidemia  6. Anxiety management with home medication as well as Xanax  7. Consult pulmonology  8. Lovenox 40 mg daily for anticoagulation with Covid  9.  Discharge to LTAC, pulmonary on board  Significant Diagnostic Studies:   Labs / Micro:  CBC:   Lab Results   Component Value Date    WBC 12.9 01/12/2021    RBC 3.91 01/12/2021    RBC 4.79 12/13/2011    HGB 11.4 01/12/2021    HCT 36.0 01/12/2021    MCV 92.1 01/12/2021    MCH 29.2 01/12/2021    MCHC 31.7 01/12/2021    RDW 15.1 01/12/2021    PLT 95 01/12/2021     12/13/2011     BMP:    Lab Results   Component Value Date    GLUCOSE 119 01/12/2021    GLUCOSE 107 12/13/2011     01/12/2021    K 3.9 01/12/2021    CL 82 01/12/2021    CO2 43 01/12/2021    ANIONGAP 6 01/12/2021    BUN 31 01/12/2021    CREATININE <0.40 01/12/2021    BUNCRER CANNOT BE CALCULATED 01/12/2021    CALCIUM 8.9 01/12/2021    LABGLOM CANNOT BE CALCULATED 01/12/2021    GFRAA CANNOT BE CALCULATED 01/12/2021    GFR      01/12/2021    GFR NOT REPORTED 01/12/2021     HFP:    Lab Results   Component Value Date    PROT 6.5 01/10/2021     CMP:    Lab Results   Component Value Date    GLUCOSE 119 01/12/2021    GLUCOSE 107 12/13/2011     01/12/2021    K 3.9 01/12/2021    CL 82 01/12/2021    CO2 43 01/12/2021    BUN 31 01/12/2021    CREATININE <0.40 01/12/2021    ANIONGAP 6 01/12/2021    ALKPHOS 65 01/10/2021    ALT 32 01/10/2021    AST 24 01/10/2021    BILITOT 0.29 01/10/2021    LABALBU 3.4 01/10/2021    LABALBU 4.2 12/13/2011    ALBUMIN NOT REPORTED 01/10/2021    LABGLOM CANNOT BE CALCULATED 01/12/2021 GFRAA CANNOT BE CALCULATED 01/12/2021    GFR      01/12/2021    GFR NOT REPORTED 01/12/2021    PROT 6.5 01/10/2021    CALCIUM 8.9 01/12/2021     PT/INR:    Lab Results   Component Value Date    PROTIME 15.1 12/31/2020    INR 1.2 12/31/2020     PTT: No results found for: APTT  FLP:    Lab Results   Component Value Date    CHOL 163 11/13/2020    CHOL 182 01/10/2017    TRIG 79 11/13/2020    HDL 77 11/13/2020     U/A:    Lab Results   Component Value Date    COLORU ORANGE 12/08/2020    TURBIDITY TURBID 12/08/2020    SPECGRAV 1.014 12/08/2020    HGBUR LARGE 12/08/2020    PHUR 6.5 12/08/2020    PROTEINU 2+ 12/08/2020    GLUCOSEU NEGATIVE 12/08/2020    KETUA NEGATIVE 12/08/2020    BILIRUBINUR NEGATIVE 12/08/2020    UROBILINOGEN Normal 12/08/2020    NITRU POSITIVE 12/08/2020    LEUKOCYTESUR LARGE 12/08/2020     TSH:    Lab Results   Component Value Date    TSH 0.45 11/12/2020          Radiology:  Xr Chest Portable    Result Date: 1/10/2021  No significant change in chest findings. Xr Chest Portable    Result Date: 1/8/2021  Stable chest Bilateral patchy coarse alveolar infiltrates, pneumonia the likely etiology     Xr Chest Portable    Result Date: 1/7/2021  No significant change in multifocal patchy bilateral infiltrates. Fl Modified Barium Swallow W Video    Result Date: 1/8/2021  No penetration or aspiration with the above administered substances. Please see separate speech pathology report for full discussion of findings and recommendations. Consultations:    Consults:     Final Specialist Recommendations/Findings:   IP CONSULT TO PULMONOLOGY  IP CONSULT TO PHARMACY      The patient was seen and examined on day of discharge and this discharge summary is in conjunction with any daily progress note from day of discharge.     Discharge plan:     Disposition: Long-Term Acute Care    Physician Follow Up:   MD Andres Barreto MD  [address] [place] [address],[address]  [place] [address] [phone]    In 1 week  after discharge from facility       Requiring Further Evaluation/Follow Up POST HOSPITALIZATION/Incidental Findings:     Diet: regular diet    Activity: As tolerated    Instructions to Patient:     Discharge Medications:      Medication List      START taking these medications    albuterol (2.5 MG/3ML) 0.083% nebulizer solution  Commonly known as: PROVENTIL  Take 3 mLs by nebulization every 6 hours as needed for Wheezing  Replaces: Ventolin  (90 Base) MCG/ACT inhaler     ALPRAZolam 0.25 MG tablet  Commonly known as: XANAX  Take 1 tablet by mouth 4 times daily as needed for Anxiety for up to 30 days.      aspirin 81 MG EC tablet  Take 1 tablet by mouth daily  Start taking on: January 14, 2021  Replaces: aspirin 81 MG tablet     calcium carbonate 500 MG chewable tablet  Commonly known as: TUMS  Take 1 tablet by mouth 3 times daily as needed for Heartburn     enoxaparin 40 MG/0.4ML injection  Commonly known as: LOVENOX  Inject 0.4 mLs into the skin daily  Start taking on: January 14, 2021     guaiFENesin-dextromethorphan 100-10 MG/5ML syrup  Commonly known as: ROBITUSSIN DM  Take 5 mLs by mouth every 4 hours as needed for Cough     ipratropium-albuterol 0.5-2.5 (3) MG/3ML Soln nebulizer solution  Commonly known as: DUONEB  Inhale 3 mLs into the lungs every 4 hours (while awake)     midodrine 5 MG tablet  Commonly known as: PROAMATINE  Take 1 tablet by mouth 3 times daily (with meals)     promethazine 12.5 MG tablet  Commonly known as: PHENERGAN  Take 1 tablet by mouth every 6 hours as needed for Nausea     QUEtiapine 50 MG tablet  Commonly known as: SEROQUEL  Take 1 tablet by mouth 2 times daily     vitamin D 50 MCG (2000 UT) Tabs tablet  Commonly known as: CHOLECALCIFEROL  Take 3 tablets by mouth daily  Start taking on: January 14, 2021        CHANGE how you take these medications    * Brovana 15 MCG/2ML Nebu  Generic drug: Arformoterol Tartrate · albuterol (2.5 MG/3ML) 0.083% nebulizer solution  · aspirin 81 MG EC tablet  · Brovana 15 MCG/2ML Nebu  · calcium carbonate 500 MG chewable tablet  · enoxaparin 40 MG/0.4ML injection  · guaiFENesin-dextromethorphan 100-10 MG/5ML syrup  · ipratropium-albuterol 0.5-2.5 (3) MG/3ML Soln nebulizer solution  · midodrine 5 MG tablet  · promethazine 12.5 MG tablet  · QUEtiapine 50 MG tablet  · vitamin D 50 MCG (2000 UT) Tabs tablet     You can get these medications from any pharmacy    Bring a paper prescription for each of these medications  · ALPRAZolam 0.25 MG tablet         No discharge procedures on file. Time Spent on discharge is  40 mins in patient examination, evaluation, counseling as well as medication reconciliation, prescriptions for required medications, discharge plan and follow up. Electronically signed by   Afua Taylor MD  1/13/2021  5:03 PM      Thank you Dr. Ml Banks MD for the opportunity to be involved in this patient's care.

## 2021-01-14 NOTE — PROGRESS NOTES
Discharged to [facility] per Tracy. Report called by AM RN to [facility]. All personal belongings accounted for. Patient placed on NRB per ambulance staff from [facility]. No distress noted on discharge.

## 2021-01-14 NOTE — PROGRESS NOTES
Attempted to call report to [facility].  Awaiting return phone call from nurse

## 2021-01-17 LAB
BNP INTERPRETATION: ABNORMAL
PRO-BNP: 453 PG/ML

## 2021-01-17 PROCEDURE — 83880 ASSAY OF NATRIURETIC PEPTIDE: CPT

## 2021-01-21 LAB
ALBUMIN SERPL-MCNC: 3.2 G/DL (ref 3.5–5.2)
ALBUMIN/GLOBULIN RATIO: ABNORMAL (ref 1–2.5)
ALP BLD-CCNC: 64 U/L (ref 40–129)
ALT SERPL-CCNC: 21 U/L (ref 5–41)
ANION GAP SERPL CALCULATED.3IONS-SCNC: 6 MMOL/L (ref 9–17)
AST SERPL-CCNC: 19 U/L
BILIRUB SERPL-MCNC: 0.2 MG/DL (ref 0.3–1.2)
BUN BLDV-MCNC: 27 MG/DL (ref 8–23)
BUN/CREAT BLD: ABNORMAL (ref 9–20)
CALCIUM SERPL-MCNC: 8.8 MG/DL (ref 8.6–10.4)
CHLORIDE BLD-SCNC: 82 MMOL/L (ref 98–107)
CO2: 44 MMOL/L (ref 20–31)
CREAT SERPL-MCNC: <0.4 MG/DL (ref 0.7–1.2)
GFR AFRICAN AMERICAN: ABNORMAL ML/MIN
GFR NON-AFRICAN AMERICAN: ABNORMAL ML/MIN
GFR SERPL CREATININE-BSD FRML MDRD: ABNORMAL ML/MIN/{1.73_M2}
GFR SERPL CREATININE-BSD FRML MDRD: ABNORMAL ML/MIN/{1.73_M2}
GLUCOSE BLD-MCNC: 135 MG/DL (ref 70–99)
HCT VFR BLD CALC: 27.7 % (ref 40.7–50.3)
HEMOGLOBIN: 9 G/DL (ref 13–17)
MAGNESIUM: 1.9 MG/DL (ref 1.6–2.6)
MCH RBC QN AUTO: 29.6 PG (ref 25.2–33.5)
MCHC RBC AUTO-ENTMCNC: 32.5 G/DL (ref 28.4–34.8)
MCV RBC AUTO: 91.1 FL (ref 82.6–102.9)
NRBC AUTOMATED: 0 PER 100 WBC
PDW BLD-RTO: 14.9 % (ref 11.8–14.4)
PLATELET # BLD: 181 K/UL (ref 138–453)
PMV BLD AUTO: 11.1 FL (ref 8.1–13.5)
POTASSIUM SERPL-SCNC: 3.5 MMOL/L (ref 3.7–5.3)
PROLACTIN: 30.98 UG/L (ref 4.04–15.2)
RBC # BLD: 3.04 M/UL (ref 4.21–5.77)
SODIUM BLD-SCNC: 132 MMOL/L (ref 135–144)
TOTAL PROTEIN: 6.6 G/DL (ref 6.4–8.3)
TROPONIN INTERP: NORMAL
TROPONIN T: NORMAL NG/ML
TROPONIN, HIGH SENSITIVITY: 22 NG/L (ref 0–22)
WBC # BLD: 8.2 K/UL (ref 3.5–11.3)

## 2021-01-21 PROCEDURE — 84484 ASSAY OF TROPONIN QUANT: CPT

## 2021-01-21 PROCEDURE — 84146 ASSAY OF PROLACTIN: CPT

## 2021-01-21 PROCEDURE — 80053 COMPREHEN METABOLIC PANEL: CPT

## 2021-01-21 PROCEDURE — 83735 ASSAY OF MAGNESIUM: CPT

## 2021-01-21 PROCEDURE — 85027 COMPLETE CBC AUTOMATED: CPT

## 2021-02-01 ENCOUNTER — CARE COORDINATION (OUTPATIENT)
Dept: CASE MANAGEMENT | Age: 86
End: 2021-02-01

## 2021-02-02 ENCOUNTER — CARE COORDINATION (OUTPATIENT)
Dept: CASE MANAGEMENT | Age: 86
End: 2021-02-02

## 2021-02-02 NOTE — CARE COORDINATION
[facility] Transitions Follow Up Call    2021    Patient: Goldie Arenas  Patient : 1933   MRN: <[id number]>  Reason for Admission:   Discharge Date: 21 RARS: Readmission Risk Score: 24    Follow Up: Attempted to contact [address] to reach anyone at the facility. No answer. Will try again at a later time. No future appointments.     Ayde Francis RN

## 2021-02-08 ENCOUNTER — CARE COORDINATION (OUTPATIENT)
Dept: CASE MANAGEMENT | Age: 86
End: 2021-02-08

## 2021-02-15 ENCOUNTER — CARE COORDINATION (OUTPATIENT)
Dept: CASE MANAGEMENT | Age: 86
End: 2021-02-15

## 2021-02-15 PROCEDURE — 83880 ASSAY OF NATRIURETIC PEPTIDE: CPT

## 2021-02-15 NOTE — CARE COORDINATION
[facility] Transitions Follow Up Call    2/15/2021    Patient: Julian Sorenson  Patient : 1933   MRN: <[id number]>  Reason for Admission:   Discharge Date: 21 RARS: Readmission Risk Score: 24    Follow Up:  Helga was informed that patient is still currently at the facility. Will continue to follow for BPCI-A bundle. No future appointments.     Alvena Seip, RN

## 2021-02-16 LAB
BNP INTERPRETATION: ABNORMAL
PRO-BNP: 829 PG/ML

## 2021-02-16 PROCEDURE — 83880 ASSAY OF NATRIURETIC PEPTIDE: CPT

## 2021-02-20 LAB
ABSOLUTE EOS #: 0 K/UL (ref 0–0.4)
ABSOLUTE IMMATURE GRANULOCYTE: 0.74 K/UL (ref 0–0.3)
ABSOLUTE LYMPH #: 0.74 K/UL (ref 1–4.8)
ABSOLUTE MONO #: 1.61 K/UL (ref 0.2–0.8)
BASOPHILS # BLD: 0 %
BASOPHILS ABSOLUTE: 0 K/UL (ref 0–0.2)
DIFFERENTIAL TYPE: ABNORMAL
EOSINOPHILS RELATIVE PERCENT: 0 % (ref 1–4)
HCT VFR BLD CALC: 35.8 % (ref 40.7–50.3)
HEMOGLOBIN: 10.8 G/DL (ref 13–17)
IMMATURE GRANULOCYTES: 6 %
LYMPHOCYTES # BLD: 6 % (ref 24–44)
MCH RBC QN AUTO: 29.3 PG (ref 25.2–33.5)
MCHC RBC AUTO-ENTMCNC: 30.2 G/DL (ref 28.4–34.8)
MCV RBC AUTO: 97.3 FL (ref 82.6–102.9)
MONOCYTES # BLD: 13 % (ref 1–7)
NRBC AUTOMATED: 0 PER 100 WBC
PDW BLD-RTO: 16.7 % (ref 11.8–14.4)
PLATELET # BLD: 100 K/UL (ref 138–453)
PLATELET ESTIMATE: ABNORMAL
PMV BLD AUTO: 12.1 FL (ref 8.1–13.5)
PROCALCITONIN: 0.08 NG/ML
RBC # BLD: 3.68 M/UL (ref 4.21–5.77)
RBC # BLD: ABNORMAL 10*6/UL
SEG NEUTROPHILS: 75 % (ref 36–66)
SEGMENTED NEUTROPHILS ABSOLUTE COUNT: 9.31 K/UL (ref 1.8–7.7)
WBC # BLD: 12.4 K/UL (ref 3.5–11.3)
WBC # BLD: ABNORMAL 10*3/UL

## 2021-02-20 PROCEDURE — 85025 COMPLETE CBC W/AUTO DIFF WBC: CPT

## 2021-02-20 PROCEDURE — 84145 PROCALCITONIN (PCT): CPT

## 2021-02-22 ENCOUNTER — CARE COORDINATION (OUTPATIENT)
Dept: CASE MANAGEMENT | Age: 86
End: 2021-02-22

## 2021-02-22 PROCEDURE — U0005 INFEC AGEN DETEC AMPLI PROBE: HCPCS

## 2021-02-22 PROCEDURE — U0003 INFECTIOUS AGENT DETECTION BY NUCLEIC ACID (DNA OR RNA); SEVERE ACUTE RESPIRATORY SYNDROME CORONAVIRUS 2 (SARS-COV-2) (CORONAVIRUS DISEASE [COVID-19]), AMPLIFIED PROBE TECHNIQUE, MAKING USE OF HIGH THROUGHPUT TECHNOLOGIES AS DESCRIBED BY CMS-2020-01-R: HCPCS

## 2021-02-22 NOTE — CARE COORDINATION
[facility] Transitions Follow Up Call    2021    Patient: Shannan Christianson  Patient : 1933   MRN: <[id number]>  Reason for Admission:   Discharge Date: 21 RARS: Readmission Risk Score: 24       Spoke with Lidya at [facility] and confirmed patient is still at the facility. Will continue to follow.     Lele Nguyen RN

## 2021-02-23 LAB
SARS-COV-2: NORMAL
SARS-COV-2: NOT DETECTED
SOURCE: NORMAL

## 2021-02-24 ENCOUNTER — TELEPHONE (OUTPATIENT)
Dept: PRIMARY CARE CLINIC | Age: 86
End: 2021-02-24

## 2021-03-01 ENCOUNTER — CARE COORDINATION (OUTPATIENT)
Dept: CASE MANAGEMENT | Age: 86
End: 2021-03-01

## 2021-03-01 LAB
ALBUMIN SERPL-MCNC: 3 G/DL (ref 3.5–5.2)
ALBUMIN/GLOBULIN RATIO: ABNORMAL (ref 1–2.5)
ALP BLD-CCNC: 56 U/L (ref 40–129)
ALT SERPL-CCNC: 10 U/L (ref 5–41)
ANION GAP SERPL CALCULATED.3IONS-SCNC: 6 MMOL/L (ref 9–17)
AST SERPL-CCNC: 15 U/L
BILIRUB SERPL-MCNC: 0.19 MG/DL (ref 0.3–1.2)
BUN BLDV-MCNC: 16 MG/DL (ref 8–23)
BUN/CREAT BLD: ABNORMAL (ref 9–20)
CALCIUM SERPL-MCNC: 8.6 MG/DL (ref 8.6–10.4)
CHLORIDE BLD-SCNC: 87 MMOL/L (ref 98–107)
CO2: 44 MMOL/L (ref 20–31)
CREAT SERPL-MCNC: <0.4 MG/DL (ref 0.7–1.2)
GFR AFRICAN AMERICAN: ABNORMAL ML/MIN
GFR NON-AFRICAN AMERICAN: ABNORMAL ML/MIN
GFR SERPL CREATININE-BSD FRML MDRD: ABNORMAL ML/MIN/{1.73_M2}
GFR SERPL CREATININE-BSD FRML MDRD: ABNORMAL ML/MIN/{1.73_M2}
GLUCOSE BLD-MCNC: 102 MG/DL (ref 70–99)
MAGNESIUM: 1.9 MG/DL (ref 1.6–2.6)
PHOSPHORUS: 2.6 MG/DL (ref 2.5–4.5)
POTASSIUM SERPL-SCNC: 3.2 MMOL/L (ref 3.7–5.3)
SODIUM BLD-SCNC: 137 MMOL/L (ref 135–144)
TOTAL PROTEIN: 5.9 G/DL (ref 6.4–8.3)

## 2021-03-01 PROCEDURE — 84100 ASSAY OF PHOSPHORUS: CPT

## 2021-03-01 PROCEDURE — 83735 ASSAY OF MAGNESIUM: CPT

## 2021-03-01 PROCEDURE — 80053 COMPREHEN METABOLIC PANEL: CPT

## 2021-03-01 NOTE — CARE COORDINATION
[facility] Transitions Follow Up Call    3/1/2021    Patient: Ivanna Abarca  Patient : 1933   MRN: <[id number]>  Reason for Admission:   Discharge Date: 21 RARS: Readmission Risk Score: 24    Follow Up:  [address] [facility] transferred call to the nurses station. No answer when call was transferred. Will try again at a later time. No future appointments.     Vinny Ordonez RN

## 2021-03-08 ENCOUNTER — CARE COORDINATION (OUTPATIENT)
Dept: CASE MANAGEMENT | Age: 86
End: 2021-03-08

## 2021-03-09 ENCOUNTER — CARE COORDINATION (OUTPATIENT)
Dept: CASE MANAGEMENT | Age: 86
End: 2021-03-09

## 2021-03-09 NOTE — CARE COORDINATION
[facility] Transitions Follow Up Call    3/9/2021    Patient: Rashel Manley  Patient : 1933   MRN: [id number]  Reason for Admission:   Discharge Date: 21 RARS: Readmission Risk Score: 24    Follow Up: Attempted to contact patient for BPCI-A follow up. Unable to reach patient. Left message at home number listed. Left message with contact information and request for call back. No future appointments.     Candace Lopes RN

## 2021-03-15 ENCOUNTER — CARE COORDINATION (OUTPATIENT)
Dept: CASE MANAGEMENT | Age: 86
End: 2021-03-15

## 2021-03-15 NOTE — CARE COORDINATION
[facility] Transitions Follow Up Call    3/15/2021    Patient: Inés Lobo  Patient : 1933   MRN: [id number]  Reason for Admission:   Discharge Date: 21 RARS: Readmission Risk Score: 24    Follow Up: Attempted to contact patient for BPCI-A follow up. Unable to reach patient. Mobile number is unavailable. No answer when calling home phone number listed. Will try again at a later time. No future appointments.     Renee Mejia RN

## 2021-03-24 ENCOUNTER — CARE COORDINATION (OUTPATIENT)
Dept: CASE MANAGEMENT | Age: 86
End: 2021-03-24

## 2021-03-31 ENCOUNTER — CARE COORDINATION (OUTPATIENT)
Dept: CASE MANAGEMENT | Age: 86
End: 2021-03-31

## 2023-07-27 NOTE — CARE COORDINATION
Case Management Initial Discharge Plan  [facility],             Met with:family member daughter to discuss discharge plans. Information verified: address, contacts, phone number, , insurance Yes    Emergency Contact/Next of Kin name & number: Katerine/daughter   [phone]    PCP: Elena Oropeza MD  Date of last visit: 2 months    Insurance Provider: medicare and [facility]    Discharge Planning    Living Arrangements:  Friends([facility])   Support Systems:  Family Members    Home has 1 stories  0 stairs to climb to get into front door, 0stairs to climb to reach second floor  Location of bedroom/bathroom in home main    Patient able to perform ADL's:Assisted    Current Services (outpatient & in home) Currentlt from [facility]  DME equipment: w/c and oxygen  DME provider: -    Receiving oral anticoagulation therapy? No    If indicated:   Physician managing anticoagulation treatment:   Where does patient obtain lab work for ATC treatment? Potential Assistance Needed:  N/A    Patient agreeable to home care: No  Olds of choice provided:  n/a    Prior SNF/Rehab Placement and Facility: [address] [place] [address] to SNF/Rehab: Yes  Olds of choice provided: yes     Evaluation: no    Expected Discharge date:  21    Patient expects to be discharged to: [facility]  Follow Up Appointment: Best Day/ Time: Tuesday AM    Transportation provider: FIONA  Transportation arrangements needed for discharge: Yes    Readmission Risk              Risk of Unplanned Readmission:        16             Does patient have a readmission risk score greater than 14?: Yes  If yes, follow-up appointment must be made within 7 days of discharge. Goals of Care:       Discharge Plan: Dg: pneumonia d/t COVID  Spoke with daughterSRINIVASAN  Patient is currently staying at [facility] and is planning on returning there.   He is going to be long term there, in the assisted living  Not driving  [facility] PT/OT and pulmonary consulted  Continue to follow.             Electronically signed by Taran Sesay RN on 12/31/20 at 11:22 AM EST [FreeTextEntry2] : ultrasound follow up